# Patient Record
Sex: FEMALE | Race: BLACK OR AFRICAN AMERICAN | NOT HISPANIC OR LATINO | Employment: OTHER | URBAN - NONMETROPOLITAN AREA
[De-identification: names, ages, dates, MRNs, and addresses within clinical notes are randomized per-mention and may not be internally consistent; named-entity substitution may affect disease eponyms.]

---

## 2017-01-03 ENCOUNTER — TELEPHONE (OUTPATIENT)
Dept: FAMILY MEDICINE CLINIC | Facility: CLINIC | Age: 72
End: 2017-01-03

## 2017-01-03 RX ORDER — AMLODIPINE BESYLATE 5 MG/1
5 TABLET ORAL DAILY
Qty: 30 TABLET | Refills: 5 | Status: SHIPPED | OUTPATIENT
Start: 2017-01-03 | End: 2017-11-17 | Stop reason: SDUPTHER

## 2017-02-06 RX ORDER — MECLIZINE HYDROCHLORIDE 25 MG/1
TABLET ORAL
Qty: 90 TABLET | Refills: 0 | Status: CANCELLED | OUTPATIENT
Start: 2017-02-06

## 2017-02-07 ENCOUNTER — TELEPHONE (OUTPATIENT)
Dept: FAMILY MEDICINE CLINIC | Facility: CLINIC | Age: 72
End: 2017-02-07

## 2017-02-07 NOTE — TELEPHONE ENCOUNTER
----- Message from Carey Thurston sent at 2/7/2017  8:51 AM CST -----  Regarding: Med refill  Contact: [phone]  Patient needs hydroxychloroquine 200 mg and meclizine 25 mg refilled at [facility].

## 2017-02-09 RX ORDER — MECLIZINE HYDROCHLORIDE 25 MG/1
25 TABLET ORAL 3 TIMES DAILY PRN
Qty: 30 TABLET | Refills: 2 | Status: SHIPPED | OUTPATIENT
Start: 2017-02-09 | End: 2017-03-30 | Stop reason: SDUPTHER

## 2017-02-09 RX ORDER — HYDROXYCHLOROQUINE SULFATE 200 MG/1
200 TABLET, FILM COATED ORAL DAILY
Qty: 30 TABLET | Refills: 2 | Status: SHIPPED | OUTPATIENT
Start: 2017-02-09 | End: 2021-04-20

## 2017-03-30 ENCOUNTER — OFFICE VISIT (OUTPATIENT)
Dept: FAMILY MEDICINE CLINIC | Facility: CLINIC | Age: 72
End: 2017-03-30

## 2017-03-30 VITALS
BODY MASS INDEX: 18.97 KG/M2 | WEIGHT: 111.1 LBS | SYSTOLIC BLOOD PRESSURE: 120 MMHG | HEART RATE: 75 BPM | DIASTOLIC BLOOD PRESSURE: 80 MMHG | OXYGEN SATURATION: 92 % | HEIGHT: 64 IN

## 2017-03-30 DIAGNOSIS — Z12.31 ENCOUNTER FOR SCREENING MAMMOGRAM FOR BREAST CANCER: ICD-10-CM

## 2017-03-30 DIAGNOSIS — R42 VERTIGO: Primary | ICD-10-CM

## 2017-03-30 DIAGNOSIS — Z23 NEED FOR STREPTOCOCCUS PNEUMONIAE VACCINATION: ICD-10-CM

## 2017-03-30 DIAGNOSIS — J30.9 ALLERGIC RHINITIS, UNSPECIFIED ALLERGIC RHINITIS TRIGGER, UNSPECIFIED RHINITIS SEASONALITY: ICD-10-CM

## 2017-03-30 DIAGNOSIS — G62.9 NEUROPATHY: ICD-10-CM

## 2017-03-30 PROCEDURE — 90670 PCV13 VACCINE IM: CPT | Performed by: FAMILY MEDICINE

## 2017-03-30 PROCEDURE — 99213 OFFICE O/P EST LOW 20 MIN: CPT | Performed by: FAMILY MEDICINE

## 2017-03-30 PROCEDURE — G0009 ADMIN PNEUMOCOCCAL VACCINE: HCPCS | Performed by: FAMILY MEDICINE

## 2017-03-30 RX ORDER — GABAPENTIN 300 MG/1
300 CAPSULE ORAL 3 TIMES DAILY
Qty: 90 CAPSULE | Refills: 3 | Status: SHIPPED | OUTPATIENT
Start: 2017-03-30 | End: 2017-11-17 | Stop reason: SDUPTHER

## 2017-03-30 RX ORDER — MECLIZINE HYDROCHLORIDE 25 MG/1
25 TABLET ORAL 3 TIMES DAILY PRN
Qty: 30 TABLET | Refills: 2 | Status: SHIPPED | OUTPATIENT
Start: 2017-03-30 | End: 2017-11-17 | Stop reason: SDUPTHER

## 2017-03-30 RX ORDER — FLUTICASONE PROPIONATE 50 MCG
2 SPRAY, SUSPENSION (ML) NASAL DAILY
Qty: 1 EACH | Refills: 0 | Status: SHIPPED | OUTPATIENT
Start: 2017-03-30 | End: 2017-04-29

## 2017-04-10 ENCOUNTER — TELEPHONE (OUTPATIENT)
Dept: FAMILY MEDICINE CLINIC | Facility: CLINIC | Age: 72
End: 2017-04-10

## 2017-04-10 NOTE — TELEPHONE ENCOUNTER
----- Message from Carey Thurston sent at 4/10/2017  1:40 PM CDT -----  Regarding: med refill  Contact: [phone]  Patient wants a refill on divalprox 500 mg at [facility]

## 2017-04-11 RX ORDER — DIVALPROEX SODIUM 500 MG/1
500 TABLET, DELAYED RELEASE ORAL DAILY
Qty: 30 TABLET | Refills: 5 | Status: SHIPPED | OUTPATIENT
Start: 2017-04-11 | End: 2017-11-17 | Stop reason: SDUPTHER

## 2017-04-12 ENCOUNTER — OFFICE VISIT (OUTPATIENT)
Dept: OPHTHALMOLOGY | Facility: CLINIC | Age: 72
End: 2017-04-12

## 2017-04-12 ENCOUNTER — TELEPHONE (OUTPATIENT)
Dept: FAMILY MEDICINE CLINIC | Facility: CLINIC | Age: 72
End: 2017-04-12

## 2017-04-12 DIAGNOSIS — IMO0002 NUCLEAR CATARACT, BILATERAL: ICD-10-CM

## 2017-04-12 DIAGNOSIS — Z79.899 LONG-TERM USE OF HYDROXYCHLOROQUINE: Primary | ICD-10-CM

## 2017-04-12 DIAGNOSIS — H40.003 BORDERLINE GLAUCOMA, BILATERAL: ICD-10-CM

## 2017-04-12 PROBLEM — H40.009 BORDERLINE GLAUCOMA: Status: ACTIVE | Noted: 2017-04-12

## 2017-04-12 PROCEDURE — 92004 COMPRE OPH EXAM NEW PT 1/>: CPT | Performed by: OPHTHALMOLOGY

## 2017-04-12 PROCEDURE — 92134 CPTRZ OPH DX IMG PST SGM RTA: CPT | Performed by: OPHTHALMOLOGY

## 2017-04-12 PROCEDURE — 92083 EXTENDED VISUAL FIELD XM: CPT | Performed by: OPHTHALMOLOGY

## 2017-04-12 NOTE — PROGRESS NOTES
Subjective   Mary Anne Wolf is a 71 y.o. female.   Chief Complaint   Patient presents with   • Eye Exam     HPI     Eye Exam   Laterality: both eyes   Quality: blurry vision   Severity: mild           Comments   No vision complaints, on Plaquenil over 5 years       Last edited by Wai Chavarria MD on 4/12/2017  9:44 AM. (History)          Review of Systems    Objective   Base Eye Exam     Visual Acuity (Snellen - Linear)      Right Left   Dist cc 20/30 -1 20/25   Near cc J1 J2       Correction:  Glasses      Tonometry (Applanation, 9:46 AM)      Right Left   Pressure 17 17         Pupils      Pupils   Right PERRL   Left PERRL         Visual Fields      Left Right   Result Full Full         Extraocular Movement      Right Left   Result Full Full         Dilation     Both eyes:  1.0% Mydriacyl, 2.5% Danilo Synephrine @ 10:18 AM            Additional Tests     Color      Right Left   Ishihara 15/15 15/15               Slit Lamp and Fundus Exam     External Exam      Right Left    External Normal Normal      Slit Lamp Exam      Right Left    Lids/Lashes Normal Normal    Conjunctiva/Sclera White and quiet White and quiet    Cornea Clear Clear    Anterior Chamber Deep and quiet Deep and quiet    Iris Round and reactive Round and reactive    Lens 1+ Nuclear sclerosis 1+ Nuclear sclerosis    Vitreous Normal Normal      Fundus Exam      Right Left    Disc Thin rim 0.1 ST, 0.15 IT Thin rim 0.1 ST, 0.15 It    Macula Normal Normal    Vessels Normal Normal    Periphery Normal Normal            Refraction     Wearing Rx      Sphere Axis Add   Right +2.50 180 +2.75   Left +1.75 180 +2.75         Manifest Refraction      Sphere Cylinder Axis Dist   Right +2.00 +0.75 015 20/25+   Left +1.75 Sphere  20/25+         Final Rx      Sphere Cylinder Axis Add   Right +2.00 +0.75 015 +2.50   Left +1.75 Sphere  +2.50               Cintron Visual Field : 10-2  OD- normal  OS- normal     OCT Macula:  OD- normal photoreceptor layer  OS-  normal photoreceptor layer      Assessment/Plan   Diagnoses and all orders for this visit:    Long-term use of hydroxychloroquine  Comments:  no ocular adverse effects    Nuclear cataract, bilateral    Borderline glaucoma, bilateral  Comments:  suspicious disc cupping      Plan:   discussed findings  rec glaucoma eval  Glasses Rx given per refraction  cooyt to Dr Gay    Return in about 2 weeks (around 4/26/2017) for Visual Field 24-2, OCT discs, CCT.

## 2017-04-12 NOTE — TELEPHONE ENCOUNTER
----- Message from Carey Thurston sent at 4/12/2017  3:25 PM CDT -----  Regarding: returning call  Contact: [phone]  Patient was returning your call

## 2017-04-20 PROBLEM — M81.0 OSTEOPOROSIS, UNSPECIFIED: Status: ACTIVE | Noted: 2017-04-20

## 2017-04-21 ENCOUNTER — INFUSION (OUTPATIENT)
Dept: ONCOLOGY | Facility: HOSPITAL | Age: 72
End: 2017-04-21

## 2017-04-21 DIAGNOSIS — M81.0 OSTEOPOROSIS, UNSPECIFIED: Primary | ICD-10-CM

## 2017-04-21 PROCEDURE — 25010000002 DENOSUMAB 60 MG/ML SOLUTION: Performed by: INTERNAL MEDICINE

## 2017-04-21 PROCEDURE — 96372 THER/PROPH/DIAG INJ SC/IM: CPT | Performed by: HOSPITALIST

## 2017-04-21 RX ADMIN — DENOSUMAB 60 MG: 60 INJECTION SUBCUTANEOUS at 08:20

## 2017-04-25 ENCOUNTER — OFFICE VISIT (OUTPATIENT)
Dept: OPHTHALMOLOGY | Facility: CLINIC | Age: 72
End: 2017-04-25

## 2017-04-25 DIAGNOSIS — H40.003 BORDERLINE GLAUCOMA, BILATERAL: Primary | ICD-10-CM

## 2017-04-25 PROCEDURE — 92133 CPTRZD OPH DX IMG PST SGM ON: CPT | Performed by: OPHTHALMOLOGY

## 2017-04-25 PROCEDURE — 99212 OFFICE O/P EST SF 10 MIN: CPT | Performed by: OPHTHALMOLOGY

## 2017-04-25 PROCEDURE — 76514 ECHO EXAM OF EYE THICKNESS: CPT | Performed by: OPHTHALMOLOGY

## 2017-04-25 PROCEDURE — 92082 INTERMEDIATE VISUAL FIELD XM: CPT | Performed by: OPHTHALMOLOGY

## 2017-04-25 NOTE — PROGRESS NOTES
Subjective   Mary Anne Wolf is a 71 y.o. female.   Chief Complaint   Patient presents with   • Cataract   • Glaucoma   • long term use of Plaquenil     HPI     Cataract   Laterality: both eyes           Comments   F/u for CCT, VF, OCT discs       Last edited by Wai Chavarria MD on 4/25/2017 11:23 AM. (History)          Review of Systems    Objective   Base Eye Exam     Visual Acuity (Snellen - Linear)      Right Left   Dist cc 20/40 20/50 +2       Correction:  Glasses      Tonometry (Applanation, 11:23 AM)      Right Left   Pressure 11 11         Pachymetry (4/25/2017)      Right Left   Thickness 464 477               Slit Lamp and Fundus Exam     External Exam      Right Left    External Normal Normal      Slit Lamp Exam      Right Left    Lids/Lashes Normal Normal    Conjunctiva/Sclera White and quiet White and quiet    Cornea Clear Clear    Anterior Chamber Deep and quiet Deep and quiet    Iris Round and reactive Round and reactive    Lens 1+ Nuclear sclerosis 1+ Nuclear sclerosis    Vitreous Normal Normal      Fundus Exam      Right Left    Disc Thin rim 0.1 ST, 0.15 IT Thin rim 0.1 ST, 0.15 It    Macula Normal Normal    Vessels Normal Normal            Refraction     Wearing Rx      Sphere Axis Add   Right +2.50 180 +2.75   Left +1.75 180 +2.75               Cintron Visual Field : 24-2  OD- normal  OS- normal     OCT Disc:   OD- normal  OS- normal      Assessment/Plan   Diagnoses and all orders for this visit:    Borderline glaucoma, bilateral      Plan:   discussed findings    Return in about 6 months (around 10/25/2017).

## 2017-06-29 ENCOUNTER — OFFICE VISIT (OUTPATIENT)
Dept: FAMILY MEDICINE CLINIC | Facility: CLINIC | Age: 72
End: 2017-06-29

## 2017-06-29 VITALS
BODY MASS INDEX: 19.89 KG/M2 | HEIGHT: 62 IN | DIASTOLIC BLOOD PRESSURE: 68 MMHG | HEART RATE: 79 BPM | OXYGEN SATURATION: 93 % | WEIGHT: 108.1 LBS | SYSTOLIC BLOOD PRESSURE: 110 MMHG

## 2017-06-29 DIAGNOSIS — R42 VERTIGO: ICD-10-CM

## 2017-06-29 DIAGNOSIS — Z00.00 MEDICARE ANNUAL WELLNESS VISIT, INITIAL: Primary | ICD-10-CM

## 2017-06-29 PROCEDURE — G0438 PPPS, INITIAL VISIT: HCPCS | Performed by: FAMILY MEDICINE

## 2017-06-29 RX ORDER — FAMOTIDINE 40 MG/1
40 TABLET, FILM COATED ORAL DAILY
Qty: 30 TABLET | Refills: 11 | Status: SHIPPED | OUTPATIENT
Start: 2017-06-29 | End: 2017-11-17

## 2017-06-29 RX ORDER — CETIRIZINE HYDROCHLORIDE 10 MG/1
10 TABLET ORAL DAILY
Qty: 30 TABLET | Refills: 11 | Status: SHIPPED | OUTPATIENT
Start: 2017-06-29 | End: 2017-11-17 | Stop reason: SDUPTHER

## 2017-06-29 NOTE — PROGRESS NOTES
QUICK REFERENCE INFORMATION:  The ABCs of the Annual Wellness Visit    Initial Medicare Wellness Visit    HEALTH RISK ASSESSMENT    1945    Recent Hospitalizations:  No hospitalization(s) within the last year..    Current Medical Providers:  Patient Care Team:  Bess Suárez DO as PCP - General (Family Medicine)      Smoking Status:  History   Smoking Status   • Current Every Day Smoker   Smokeless Tobacco   • Never Used       Alcohol Consumption:  History   Alcohol Use No       Depression Screen:   PHQ-9 Depression Screening 6/29/2017   Little interest or pleasure in doing things 0   Feeling down, depressed, or hopeless 0   PHQ-9 Total Score 0       Health Habits and Functional and Cognitive Screening:  Functional & Cognitive Status 6/29/2017   Do you have difficulty preparing food and eating? No   Do you have difficulty bathing yourself? No   Do you have difficulty getting dressed? No   Do you have difficulty using the toilet? No   Do you have difficulty moving around from place to place? Yes   In the past year have you fallen or experienced a near fall? No   Do you need help using the phone?  No   Are you deaf or do you have serious difficulty hearing?  No   Do you need help with transportation? No   Do you need help shopping? No   Do you need help preparing meals?  No   Do you need help with housework?  No   Do you need help with laundry? No   Do you need help taking your medications? No   Do you need help managing money? No       Health Habits  Current Diet: Frequent Junk Food  Dental Exam: Not up to date  Eye Exam: Up to date (April 2017)  Exercise (times per week): 2 times per week (does some yard work )  Current Exercise Activities Include: Walking (tries to wallk some during the week )      Does the patient have evidence of cognitive impairment? No    Asiprin use counseling: Start ASA 81 mg daily       Recent Lab Results:    Visual Acuity:  No exam data present    Age-appropriate Screening  Schedule:  Refer to the list below for future screening recommendations based on patient's age, sex and/or medical conditions. Orders for these recommended tests are listed in the plan section. The patient has been provided with a written plan.    Health Maintenance   Topic Date Due   • TDAP/TD VACCINES (1 - Tdap) 06/04/1964   • COLONOSCOPY  10/14/2016   • ZOSTER VACCINE  10/14/2016   • INFLUENZA VACCINE  08/01/2017   • DXA SCAN  11/12/2017   • PNEUMOCOCCAL VACCINES (65+ LOW/MEDIUM RISK) (2 of 2 - PPSV23) 03/30/2018   • MAMMOGRAM  04/12/2019        Subjective   History of Present Illness    Mary Anne Wolf is a 72 y.o. female who presents for an Annual Wellness Visit.      The following portions of the patient's history were reviewed and updated as appropriate: allergies, current medications, past family history, past medical history, past social history, past surgical history and problem list.    Outpatient Medications Prior to Visit   Medication Sig Dispense Refill   • amLODIPine (NORVASC) 5 MG tablet Take 1 tablet by mouth Daily. 30 tablet 5   • baclofen (LIORESAL) 10 MG tablet Take 10 mg by mouth 3 (Three) Times a Day.     • Cyanocobalamin (VITAMIN B-12 ER) 1500 MCG tablet controlled-release Take 1 tablet by mouth Daily.     • denosumab (PROLIA) 60 MG/ML solution syringe Inject 60 mg under the skin 1 (One) Time.     • divalproex (DEPAKOTE) 500 MG DR tablet Take 1 tablet by mouth Daily. 30 tablet 5   • gabapentin (NEURONTIN) 300 MG capsule Take 1 capsule by mouth 3 (Three) Times a Day. 90 capsule 3   • hydroxychloroquine (PLAQUENIL) 200 MG tablet Take 1 tablet by mouth Daily. 30 tablet 2   • meclizine (ANTIVERT) 25 MG tablet Take 1 tablet by mouth 3 (Three) Times a Day As Needed for dizziness. 30 tablet 2   • omeprazole (PriLOSEC) 40 MG capsule Take 1 capsule by mouth Daily.       No facility-administered medications prior to visit.        Patient Active Problem List   Diagnosis   • Essential hypertension   •  "Neuropathy   • Seizure disorder   • Vertigo   • Systemic lupus erythematosus   • Borderline glaucoma   • Nuclear cataract   • Long-term use of hydroxychloroquine   • Osteoporosis, unspecified       Advance Care Planning:  has NO advance directive - information provided to the patient today    Identification of Risk Factors:  Risk factors include: weight  and chronic pain.    Review of Systems    Compared to one year ago, the patient feels her physical health is the same.  Compared to one year ago, the patient feels her mental health is the same.    Objective     Physical Exam    Vitals:    06/29/17 0813   BP: 110/68   BP Location: Left arm   Patient Position: Sitting   Cuff Size: Small Adult   Pulse: 79   SpO2: 93%   Weight: 108 lb 1.6 oz (49 kg)   Height: 61.5\" (156.2 cm)   PainSc: 0-No pain       Body mass index is 20.09 kg/(m^2).  Discussed the patient's BMI with her. The BMI is below average; BMI management plan is completed.    Assessment/Plan   Patient Self-Management and Personalized Health Advice  The patient has been provided with information about: tobacco cessation and preventive services including:   · Advance directive, colonoscopy recommended, patient declined.    Visit Diagnoses:    ICD-10-CM ICD-9-CM   1. Medicare annual wellness visit, initial Z00.00 V70.0   2. Vertigo R42 780.4     -Patient declines colonoscopy. Cologuard discussed. Patient declines today.  -she states she has had CT chest and DEXA at [facility] by her rheumatologist this year. She will get records from them and bring them  - counseled on smoking cessation. Not ready to quit.   - meclizine refilled  -discussed depression screening, patient states she is not depressed. Trouble sleeping due to reflux, which she is not taking anything for. Will start pepcid after discuss risks of ppi's    No orders of the defined types were placed in this encounter.      Outpatient Encounter Prescriptions as of 6/29/2017   Medication Sig Dispense " Refill   • amLODIPine (NORVASC) 5 MG tablet Take 1 tablet by mouth Daily. 30 tablet 5   • baclofen (LIORESAL) 10 MG tablet Take 10 mg by mouth 3 (Three) Times a Day.     • Cyanocobalamin (VITAMIN B-12 ER) 1500 MCG tablet controlled-release Take 1 tablet by mouth Daily.     • denosumab (PROLIA) 60 MG/ML solution syringe Inject 60 mg under the skin 1 (One) Time.     • divalproex (DEPAKOTE) 500 MG DR tablet Take 1 tablet by mouth Daily. 30 tablet 5   • gabapentin (NEURONTIN) 300 MG capsule Take 1 capsule by mouth 3 (Three) Times a Day. 90 capsule 3   • hydroxychloroquine (PLAQUENIL) 200 MG tablet Take 1 tablet by mouth Daily. 30 tablet 2   • meclizine (ANTIVERT) 25 MG tablet Take 1 tablet by mouth 3 (Three) Times a Day As Needed for dizziness. 30 tablet 2   • [DISCONTINUED] omeprazole (PriLOSEC) 40 MG capsule Take 1 capsule by mouth Daily.     • cetirizine (zyrTEC) 10 MG tablet Take 1 tablet by mouth Daily. 30 tablet 11   • famotidine (PEPCID) 40 MG tablet Take 1 tablet by mouth Daily. 30 tablet 11     No facility-administered encounter medications on file as of 6/29/2017.        Reviewed use of high risk medication in the elderly: yes  Reviewed for potential of harmful drug interactions in the elderly: yes    Follow Up:  Return in about 6 months (around 12/29/2017) for Recheck.     An After Visit Summary and PPPS with all of these plans were given to the patient.              This document has been electronically signed by Bess Suárez DO on June 29, 2017 1:50 PM

## 2017-06-29 NOTE — PATIENT INSTRUCTIONS
Medicare Wellness  Personal Prevention Plan of Service     Date of Office Visit:  2017  Encounter Provider:  Bess Suárez DO  Place of Service:  [facility]  Patient Name: Mary Anne Wolf  :  1945    As part of the Medicare Wellness portion of your visit today, we are providing you with this personalized preventive plan of services (PPPS). This plan is based upon recommendations of the [place] Preventive Services Task Force (USPSTF) and the Advisory Committee on Immunization Practices (ACIP).    This lists the preventive care services that should be considered, and provides dates of when you are due. Items listed as completed are up-to-date and do not require any further intervention.    Health Maintenance   Topic Date Due   • TDAP/TD VACCINES (1 - Tdap) 1963   • MEDICARE ANNUAL WELLNESS  10/14/2016   • COLONOSCOPY  10/14/2016   • ZOSTER VACCINE  10/14/2016   • INFLUENZA VACCINE  2017   • DXA SCAN  2017   • PNEUMOCOCCAL VACCINES (65+ LOW/MEDIUM RISK) (2 of 2 - PPSV23) 2018   • MAMMOGRAM  2019       No orders of the defined types were placed in this encounter.      Return in about 6 months (around 2017) for Recheck.

## 2017-07-06 DIAGNOSIS — M79.672 PAIN IN BOTH FEET: Primary | ICD-10-CM

## 2017-07-06 DIAGNOSIS — M79.671 PAIN IN BOTH FEET: Primary | ICD-10-CM

## 2017-07-07 ENCOUNTER — HOSPITAL ENCOUNTER (EMERGENCY)
Facility: HOSPITAL | Age: 72
Discharge: HOME | End: 2017-07-07
Attending: EMERGENCY MEDICINE | Admitting: EMERGENCY MEDICINE

## 2017-07-07 ENCOUNTER — APPOINTMENT (OUTPATIENT)
Dept: CT IMAGING | Facility: HOSPITAL | Age: 72
End: 2017-07-07

## 2017-07-07 VITALS
RESPIRATION RATE: 18 BRPM | OXYGEN SATURATION: 97 % | TEMPERATURE: 98.4 F | SYSTOLIC BLOOD PRESSURE: 154 MMHG | BODY MASS INDEX: 16.95 KG/M2 | DIASTOLIC BLOOD PRESSURE: 79 MMHG | HEIGHT: 67 IN | WEIGHT: 108 LBS | HEART RATE: 78 BPM
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
## 2017-07-07 DIAGNOSIS — M54.50 ACUTE MIDLINE LOW BACK PAIN WITHOUT SCIATICA: ICD-10-CM

## 2017-07-07 DIAGNOSIS — S32.010A COMPRESSION FRACTURE OF L1 LUMBAR VERTEBRA, CLOSED, INITIAL ENCOUNTER (HCC): Primary | ICD-10-CM

## 2017-07-07 PROCEDURE — 72131 CT LUMBAR SPINE W/O DYE: CPT

## 2017-07-07 PROCEDURE — 99283 EMERGENCY DEPT VISIT LOW MDM: CPT

## 2017-07-07 PROCEDURE — 72128 CT CHEST SPINE W/O DYE: CPT

## 2017-07-07 RX ORDER — HYDROCODONE BITARTRATE AND ACETAMINOPHEN 5; 325 MG/1; MG/1
1 TABLET ORAL EVERY 6 HOURS PRN
Qty: 20 TABLET | Refills: 0 | Status: SHIPPED | OUTPATIENT
Start: 2017-07-07 | End: 2017-11-17

## 2017-07-07 RX ORDER — HYDROCODONE BITARTRATE AND ACETAMINOPHEN 5; 325 MG/1; MG/1
1 TABLET ORAL ONCE
Status: COMPLETED | OUTPATIENT
Start: 2017-07-07 | End: 2017-07-07

## 2017-07-07 RX ORDER — CYCLOBENZAPRINE HCL 10 MG
10 TABLET ORAL ONCE
Status: COMPLETED | OUTPATIENT
Start: 2017-07-07 | End: 2017-07-07

## 2017-07-07 RX ADMIN — CYCLOBENZAPRINE HYDROCHLORIDE 10 MG: 10 TABLET, FILM COATED ORAL at 05:40

## 2017-07-07 RX ADMIN — HYDROCODONE BITARTRATE AND ACETAMINOPHEN 1 TABLET: 5; 325 TABLET ORAL at 06:26

## 2017-07-07 NOTE — ED TRIAGE NOTES
Pt states she was cleaning yesterday approx noon and got twisted in rug and fell back onto back on a carpeted floor, c/o back pain all over back 10/10

## 2017-07-07 NOTE — ED PROVIDER NOTES
Subjective   HPI Comments: Back pain    Patient fell about noon yesterday at home when she got tangled with a robe and fell backwards.  She got up herself and sat down onto the  came back.  She fell on a rug floor   having back pain    Only surgery was on her right knee many years ago EGD colonoscopy    Medical problem osteoporosis lupus high blood pressure GERD seizures    The smoke less than a pack a day            Patient is a 72 y.o. female presenting with fall.   History provided by:  Patient and spouse  Fall   Mechanism of injury: fall    Injury location:  Torso  Torso injury location:  Back  Incident location:  Home  Time since incident:  18 hours  Arrived directly from scene: no    Fall:     Fall occurred:  Walking    Height of fall:  3 feet    Impact surface:  Carpet and hard floor    Point of impact:  Back    Entrapped after fall: no    Protective equipment: none    Suspicion of alcohol use: no    Suspicion of drug use: no    Prior to arrival data:     Bystander interventions:  None    Patient ambulatory at scene: yes      Blood loss:  None    Loss of consciousness: no      Amnesic to event: no      Airway condition since incident:  Stable    Breathing condition since incident:  Stable  Associated symptoms: back pain    Associated symptoms: no abdominal pain, no chest pain, no headaches, no nausea, no neck pain and no seizures    Risk factors: no asthma, no COPD and no steroid use        Review of Systems   Constitutional: Negative for activity change, appetite change, fatigue and fever.   HENT: Negative for congestion, facial swelling, mouth sores, nosebleeds, sore throat and trouble swallowing.    Eyes: Negative for discharge, redness and itching.   Respiratory: Negative for apnea, cough and wheezing.    Cardiovascular: Negative for chest pain and palpitations.   Gastrointestinal: Negative for abdominal pain, blood in stool and nausea.   Endocrine: Negative for cold intolerance, heat intolerance,  polydipsia, polyphagia and polyuria.   Genitourinary: Negative for difficulty urinating, dysuria, flank pain, frequency and hematuria.   Musculoskeletal: Positive for back pain. Negative for gait problem, joint swelling and neck pain.   Skin: Negative.  Negative for color change, pallor and rash.   Allergic/Immunologic: Negative for environmental allergies.   Neurological: Negative for dizziness, seizures, syncope, speech difficulty, light-headedness, numbness and headaches.   Hematological: Negative for adenopathy.   Psychiatric/Behavioral: Negative for agitation, behavioral problems, confusion and sleep disturbance. The patient is not nervous/anxious.        Past Medical History:   Diagnosis Date   • Acute posthemorrhagic anemia    • Backache     LLE radiculopathy   • Cigarette smoker    • Discoid lupus erythematosus    • Diverticular disease of colon     incomplete colonoscopy to Hepatic flexure.  Rec ACBE, pt. considering   • Dizziness and giddiness    • Dysphagia    • Esophagitis     grade III   • Essential (primary) hypertension    • Essential hypertension    • Essential hypertension    • Gastroesophageal reflux disease     rec pt try RX prilosec   • H/O screening mammography    • Localization-related (focal) (partial) symptomatic epilepsy and epileptic syndromes with complex partial seizures, intractable, without status epilepticus    • Neuropathy    • Neuropathy    • Nicotine dependence    • Osteopenia    • Periumbilical pain    • Seizure disorder    • Seizure disorder     history of complex partial seizure   • Stricture of esophagus    • Systemic lupus erythematosus    • Systemic lupus erythematosus    • Vertigo    • Vertigo        Allergies   Allergen Reactions   • Milk-Related Compounds        Past Surgical History:   Procedure Laterality Date   • COLONOSCOPY  08/05/2015   • COLONOSCOPY  07/08/2014    REFUSED BY PATIENT   • COLONOSCOPY  08/05/2015    a diverticulum was found in the sigmoid colon   •  ENDOSCOPY  08/05/2015    Esophageal stricture was present. Dilatatin was performed. Esophagitis . Biopsy taken. Normal stomach. Normal duodenum   • INJECTION OF MEDICATION      Rocehin (2)       Family History   Problem Relation Age of Onset   • Lupus Mother    • Rheum arthritis Mother    • Lupus Sister        Social History     Social History   • Marital status:      Spouse name: N/A   • Number of children: N/A   • Years of education: N/A     Social History Main Topics   • Smoking status: Current Every Day Smoker   • Smokeless tobacco: Never Used   • Alcohol use No   • Drug use: No   • Sexual activity: Defer     Other Topics Concern   • None     Social History Narrative   • None           Objective   Physical Exam   Constitutional: She is oriented to person, place, and time. She appears well-developed and well-nourished.   HENT:   Head: Normocephalic and atraumatic.   Nose: Nose normal.   Mouth/Throat: Oropharynx is clear and moist.   Eyes: Conjunctivae and EOM are normal. Pupils are equal, round, and reactive to light.   Neck: Normal range of motion. Neck supple.   Cardiovascular: Normal rate, regular rhythm, normal heart sounds and intact distal pulses.    Pulmonary/Chest: Effort normal and breath sounds normal.   Abdominal: Soft. Bowel sounds are normal.   Musculoskeletal: Normal range of motion.   Neurological: She is alert and oriented to person, place, and time.   Skin: Skin is warm and dry.   Psychiatric: She has a normal mood and affect. Her behavior is normal. Judgment and thought content normal.   Nursing note and vitals reviewed.      Procedures         ED Course  ED Course        Labs Reviewed - No data to display     CT Thoracic Spine Without Contrast   Final Result   1. Degenerative changes with no acute abnormality of the thoracic   spine.   2. Emphysema.   3. Indeterminate right hepatic dome lesion, when feasible   recommend follow-up liver protocol MRI with contrast.      Electronically  signed by:  Tera Boyce  7/7/2017 6:18 AM CDT   Workstation: [id number]      CT Lumbar Spine Without Contrast   Final Result   1. Acute compression fracture at L1 as above.   2. Levoscoliosis with multilevel degenerative disc disease and   facet arthropathy producing levels of canal stenosis and   foraminal narrowing as above.      Electronically signed by:  Tera Boyce  7/7/2017 6:12 AM CDT   Workstation: [id number]                  [facility]    Final diagnoses:   Compression fracture of L1 lumbar vertebra, closed, initial encounter   Fall, initial encounter   Acute midline low back pain without sciatica            Ehsan Joiner MD  07/07/17 0653       Ehsan Joienr MD  08/20/17 2043       Ehsan Joiner MD  08/20/17 2046

## 2017-08-17 ENCOUNTER — OFFICE VISIT (OUTPATIENT)
Dept: PODIATRY | Facility: CLINIC | Age: 72
End: 2017-08-17

## 2017-08-17 VITALS
HEART RATE: 99 BPM | WEIGHT: 108 LBS | DIASTOLIC BLOOD PRESSURE: 71 MMHG | OXYGEN SATURATION: 96 % | HEIGHT: 67 IN | SYSTOLIC BLOOD PRESSURE: 125 MMHG | BODY MASS INDEX: 16.95 KG/M2

## 2017-08-17 DIAGNOSIS — M79.2 NEUROPATHIC PAIN: Primary | ICD-10-CM

## 2017-08-17 DIAGNOSIS — M79.672 BILATERAL FOOT PAIN: ICD-10-CM

## 2017-08-17 DIAGNOSIS — M20.11 HALLUX VALGUS, RIGHT: ICD-10-CM

## 2017-08-17 DIAGNOSIS — M79.671 BILATERAL FOOT PAIN: ICD-10-CM

## 2017-08-17 PROCEDURE — 99203 OFFICE O/P NEW LOW 30 MIN: CPT | Performed by: PODIATRIST

## 2017-08-17 RX ORDER — PREGABALIN 75 MG/1
75 CAPSULE ORAL 2 TIMES DAILY
Qty: 60 CAPSULE | Refills: 2 | Status: SHIPPED | OUTPATIENT
Start: 2017-08-17 | End: 2017-11-17

## 2017-08-17 NOTE — PROGRESS NOTES
Mary Anne Wolf  1945  72 y.o. female   PCP: Dr. Suárez last seen 6/29/2017.  Patient presents today for bilateral foot pain.     08/17/2017    Chief Complaint   Patient presents with   • Left Foot - Pain   • Right Foot - Pain           History of Present Illness    Mary Anne Wolf is a 72 y.o. female who presents for evaluation of bilateral foot pain.  She describes the pain as burning, shooting and throbbing.  States the pain keeps her up at night.  The pain typically is located on the bottoms of her feet and toes.  The pain occurs independent of weightbearing.  She has no history of trauma or injuries.  She states she was previously diagnosed with neuropathy and has been on gabapentin 300 mg 3 times daily for several years.  She states she received minimal relief from this.      Past Medical History:   Diagnosis Date   • Acute posthemorrhagic anemia    • Backache     LLE radiculopathy   • Cigarette smoker    • Discoid lupus erythematosus    • Diverticular disease of colon     incomplete colonoscopy to Hepatic flexure.  Rec ACBE, pt. considering   • Dizziness and giddiness    • Dysphagia    • Esophagitis     grade III   • Essential (primary) hypertension    • Essential hypertension    • Essential hypertension    • Gastroesophageal reflux disease     rec pt try RX prilosec   • H/O screening mammography    • Localization-related (focal) (partial) symptomatic epilepsy and epileptic syndromes with complex partial seizures, intractable, without status epilepticus    • Neuropathy    • Neuropathy    • Nicotine dependence    • Osteopenia    • Periumbilical pain    • Seizure disorder    • Seizure disorder     history of complex partial seizure   • Stricture of esophagus    • Systemic lupus erythematosus    • Systemic lupus erythematosus    • Vertigo    • Vertigo          Past Surgical History:   Procedure Laterality Date   • COLONOSCOPY  08/05/2015   • COLONOSCOPY  07/08/2014    REFUSED BY PATIENT   • COLONOSCOPY   08/05/2015    a diverticulum was found in the sigmoid colon   • ENDOSCOPY  08/05/2015    Esophageal stricture was present. Dilatatin was performed. Esophagitis . Biopsy taken. Normal stomach. Normal duodenum   • INJECTION OF MEDICATION      Rocehin (2)         Family History   Problem Relation Age of Onset   • Lupus Mother    • Rheum arthritis Mother    • Lupus Sister          Social History     Social History   • Marital status:      Spouse name: N/A   • Number of children: N/A   • Years of education: N/A     Occupational History   • Not on file.     Social History Main Topics   • Smoking status: Current Every Day Smoker   • Smokeless tobacco: Never Used   • Alcohol use No   • Drug use: No   • Sexual activity: Defer     Other Topics Concern   • Not on file     Social History Narrative         Current Outpatient Prescriptions   Medication Sig Dispense Refill   • amLODIPine (NORVASC) 5 MG tablet Take 1 tablet by mouth Daily. 30 tablet 5   • baclofen (LIORESAL) 10 MG tablet Take 10 mg by mouth 3 (Three) Times a Day.     • cetirizine (zyrTEC) 10 MG tablet Take 1 tablet by mouth Daily. 30 tablet 11   • Cyanocobalamin (VITAMIN B-12 ER) 1500 MCG tablet controlled-release Take 1 tablet by mouth Daily.     • denosumab (PROLIA) 60 MG/ML solution syringe Inject 60 mg under the skin 1 (One) Time.     • divalproex (DEPAKOTE) 500 MG DR tablet Take 1 tablet by mouth Daily. 30 tablet 5   • famotidine (PEPCID) 40 MG tablet Take 1 tablet by mouth Daily. 30 tablet 11   • gabapentin (NEURONTIN) 300 MG capsule Take 1 capsule by mouth 3 (Three) Times a Day. 90 capsule 3   • HYDROcodone-acetaminophen (NORCO) 5-325 MG per tablet Take 1 tablet by mouth Every 6 (Six) Hours As Needed for Moderate Pain (4-6). 20 tablet 0   • hydroxychloroquine (PLAQUENIL) 200 MG tablet Take 1 tablet by mouth Daily. 30 tablet 2   • meclizine (ANTIVERT) 25 MG tablet Take 1 tablet by mouth 3 (Three) Times a Day As Needed for dizziness. 30 tablet 2   •  "pregabalin (LYRICA) 75 MG capsule Take 1 capsule by mouth 2 (Two) Times a Day. 60 capsule 2     No current facility-administered medications for this visit.          OBJECTIVE    /71  Pulse 99  Ht 67\" (170.2 cm)  Wt 108 lb (49 kg)  LMP  (LMP Unknown)  SpO2 96%  BMI 16.92 kg/m2      Review of Systems   Constitutional:  Denies recent weight loss, weight gain, fever or chills, no change in exercise tolerance  Musculoskeletal: Back, leg pain.   Skin:  Thickened nails.   Neurological:  Burning sensations to feet b/l.  Psychiatric/Behavioral: Denies depression    Physical Exam   Constitutional: she appears well-developed and well-nourished.   HEENT: Normocephalic. Atraumatic  CV: No tenderness. RRR  Resp: Non-labored respiration. No wheezes.   Psychiatric: she has a normal mood and affect. her   behavior is normal.      Lower Extremity Exam:  Vascular: DP/PT pulses palpable 1+.   Negative hair growth.   Minimal perimalleolar edema  Neuro: Protective sensation diminished to lesser toes, b/l.  DTRs intact  Negative Tinel sign over tarsal tunnel  Integument: No open wounds or lesions.  Skin quality normal  No masses  Web spaces clean dry and intact  Musculoskeletal: LE muscle strength 4/5.   Gait assisted with cane  Mild hammertoe deformity toes 2-5 b/l.  Moderate to severe hallux abductovalgus on the right  No reproducible musculoskeletal pain        ASSESSMENT AND PLAN    Mary Anne was seen today for pain and pain.    Diagnoses and all orders for this visit:    Neuropathic pain    Bilateral foot pain  -     XR foot 3+ vw bilateral    Hallux valgus, right    Other orders  -     pregabalin (LYRICA) 75 MG capsule; Take 1 capsule by mouth 2 (Two) Times a Day.      -Comprehensive foot and ankle exam performed  -Educated pt on diagnosis, etiology and treatment of neuropathic pain  -Patient has been taking gabapentin with minimal relief for a long time.  Will trial Lyrica.  We'll start her on a 75 mg twice a " day.  -Addition we will obtain [facility] compounded pain cream  -Recommended wide toe box soft topped shoes for accommodation of bunion deformity  -Recheck 4 weeks          This document has been electronically signed by Cory Wallace DPM on August 25, 2017 5:42 AM     EMR Dragon/Transcription disclaimer:   Much of this encounter note is an electronic transcription/translation of spoken language to printed text. The electronic translation of spoken language may permit erroneous, or at times, nonsensical words or phrases to be inadvertently transcribed; Although I have reviewed the note for such errors, some may still exist.    Cory Wallace DPM  8/25/2017  5:42 AM

## 2017-10-24 ENCOUNTER — INFUSION (OUTPATIENT)
Dept: ONCOLOGY | Facility: HOSPITAL | Age: 72
End: 2017-10-24

## 2017-10-24 DIAGNOSIS — M81.0 OSTEOPOROSIS, UNSPECIFIED OSTEOPOROSIS TYPE, UNSPECIFIED PATHOLOGICAL FRACTURE PRESENCE: Primary | ICD-10-CM

## 2017-10-24 LAB
ALBUMIN SERPL-MCNC: 3.7 G/DL (ref 3.4–4.8)
ALBUMIN/GLOB SERPL: 1 G/DL (ref 1.1–1.8)
ALP SERPL-CCNC: 51 U/L (ref 38–126)
ALT SERPL W P-5'-P-CCNC: 18 U/L (ref 9–52)
ANION GAP SERPL CALCULATED.3IONS-SCNC: 11 MMOL/L (ref 5–15)
AST SERPL-CCNC: 18 U/L (ref 14–36)
BILIRUB SERPL-MCNC: 0.3 MG/DL (ref 0.2–1.3)
BUN BLD-MCNC: 12 MG/DL (ref 7–21)
BUN/CREAT SERPL: 11.8 (ref 7–25)
CALCIUM SPEC-SCNC: 9.6 MG/DL (ref 8.4–10.2)
CHLORIDE SERPL-SCNC: 102 MMOL/L (ref 95–110)
CO2 SERPL-SCNC: 29 MMOL/L (ref 22–31)
CREAT BLD-MCNC: 1.02 MG/DL (ref 0.5–1)
GFR SERPL CREATININE-BSD FRML MDRD: 64 ML/MIN/1.73 (ref 39–90)
GLOBULIN UR ELPH-MCNC: 3.6 GM/DL (ref 2.3–3.5)
GLUCOSE BLD-MCNC: 93 MG/DL (ref 60–100)
MAGNESIUM SERPL-MCNC: 2 MG/DL (ref 1.6–2.3)
PHOSPHATE SERPL-MCNC: 4.1 MG/DL (ref 2.4–4.4)
POTASSIUM BLD-SCNC: 4.2 MMOL/L (ref 3.5–5.1)
PROT SERPL-MCNC: 7.3 G/DL (ref 6.3–8.6)
SODIUM BLD-SCNC: 142 MMOL/L (ref 137–145)

## 2017-10-24 PROCEDURE — 25010000002 DENOSUMAB 60 MG/ML SOLUTION: Performed by: FAMILY MEDICINE

## 2017-10-24 PROCEDURE — 36415 COLL VENOUS BLD VENIPUNCTURE: CPT | Performed by: HOSPITALIST

## 2017-10-24 PROCEDURE — 96372 THER/PROPH/DIAG INJ SC/IM: CPT | Performed by: HOSPITALIST

## 2017-10-24 PROCEDURE — 83735 ASSAY OF MAGNESIUM: CPT

## 2017-10-24 PROCEDURE — 80053 COMPREHEN METABOLIC PANEL: CPT

## 2017-10-24 PROCEDURE — 84100 ASSAY OF PHOSPHORUS: CPT

## 2017-10-24 RX ADMIN — DENOSUMAB 60 MG: 60 INJECTION SUBCUTANEOUS at 15:09

## 2017-10-25 ENCOUNTER — TRANSCRIBE ORDERS (OUTPATIENT)
Dept: PHYSICAL THERAPY | Facility: HOSPITAL | Age: 72
End: 2017-10-25

## 2017-10-25 DIAGNOSIS — M54.42 ACUTE BACK PAIN WITH SCIATICA, LEFT: Primary | ICD-10-CM

## 2017-10-25 DIAGNOSIS — G89.29 CHRONIC INTRACTABLE PAIN: ICD-10-CM

## 2017-10-31 DIAGNOSIS — G62.9 NEUROPATHY: ICD-10-CM

## 2017-10-31 RX ORDER — GABAPENTIN 300 MG/1
CAPSULE ORAL
Qty: 90 CAPSULE | Refills: 1 | OUTPATIENT
Start: 2017-10-31

## 2017-11-01 ENCOUNTER — HOSPITAL ENCOUNTER (OUTPATIENT)
Dept: PHYSICAL THERAPY | Facility: HOSPITAL | Age: 72
Setting detail: THERAPIES SERIES
Discharge: HOME | End: 2017-11-01

## 2017-11-01 DIAGNOSIS — M54.42 ACUTE BACK PAIN WITH SCIATICA, LEFT: Primary | ICD-10-CM

## 2017-11-01 DIAGNOSIS — R26.89 BALANCE PROBLEM: ICD-10-CM

## 2017-11-01 PROCEDURE — G8979 MOBILITY GOAL STATUS: HCPCS

## 2017-11-01 PROCEDURE — G8978 MOBILITY CURRENT STATUS: HCPCS

## 2017-11-01 PROCEDURE — 97161 PT EVAL LOW COMPLEX 20 MIN: CPT

## 2017-11-01 NOTE — THERAPY EVALUATION
Outpatient Physical Therapy Ortho Initial Evaluation  [facility]     Patient Name: Mary Anne Wolf  : 1945  MRN: [id number]  Today's Date: 2017      Visit Date: 2017  Subjective Improvement: 0%  Visit Number: 1  Insurance approval: Pending  MD Visit: PRN  Recert Date: 2017      Therapy Diagnosis: Chronic LBP and Balance Disorder secondary to Core and LE Weakness.  Patient Active Problem List   Diagnosis   • Essential hypertension   • Neuropathy   • Seizure disorder   • Vertigo   • Systemic lupus erythematosus   • Borderline glaucoma   • Nuclear cataract   • Long-term use of hydroxychloroquine   • Osteoporosis, unspecified        Past Medical History:   Diagnosis Date   • Acute posthemorrhagic anemia    • Backache     LLE radiculopathy   • Cigarette smoker    • Discoid lupus erythematosus    • Diverticular disease of colon     incomplete colonoscopy to Hepatic flexure.  Rec ACBE, pt. considering   • Dizziness and giddiness    • Dysphagia    • Esophagitis     grade III   • Essential (primary) hypertension    • Essential hypertension    • Essential hypertension    • Gastroesophageal reflux disease     rec pt try RX prilosec   • H/O screening mammography    • Localization-related (focal) (partial) symptomatic epilepsy and epileptic syndromes with complex partial seizures, intractable, without status epilepticus    • Neuropathy    • Neuropathy    • Nicotine dependence    • Osteopenia    • Periumbilical pain    • Seizure disorder    • Seizure disorder     history of complex partial seizure   • Stricture of esophagus    • Systemic lupus erythematosus    • Systemic lupus erythematosus    • Vertigo    • Vertigo         Past Surgical History:   Procedure Laterality Date   • COLONOSCOPY  2015   • COLONOSCOPY  2014    REFUSED BY PATIENT   • COLONOSCOPY  2015    a diverticulum was found in the sigmoid colon   • ENDOSCOPY  2015    Esophageal stricture was present.  Dilatatin was performed. Esophagitis . Biopsy taken. Normal stomach. Normal duodenum   • INJECTION OF MEDICATION      Rocehin (2)       Visit Dx:     ICD-10-CM ICD-9-CM   1. Acute back pain with sciatica, left M54.42 724.3   2. Balance problem R26.89 781.99             Patient History       11/01/17 1015          History    Chief Complaint Balance Problems;Pain  -AK      Type of Pain Back pain  -AK      Date Current Problem(s) Began --   6 months ago  -AK      Brief Description of Current Complaint Pt c/o of LBP with B LE radiculopathy which began around 6 months ago after she fell as a result of her chronic standing dynamic balance issues. Pt describes her pain as intermittent, located across entirety of lower back and radiating into B LEs.Pt's pain and balance issues make any prolonged positioning, ambulation and standing activities difficult.  -AK      Patient's Rating of General Health Fair  -AK      Hand Dominance right-handed  -AK      Occupation/sports/leisure activities Retired  -AK        User Key  (r) = Recorded By, (t) = Taken By, (c) = Cosigned By    Initials Name Provider Type    HECTOR Duran, PT Physical Therapist                PT Ortho       11/01/17 1015    Precautions and Contraindications    Precautions/Limitations fall precautions  -AK    Precautions Pt has lupus and osteoporosis.  -AK    Subjective Pain    Able to rate subjective pain? yes  -AK    Pre-Treatment Pain Level 6  -AK    Post-Treatment Pain Level 6  -AK    Subjective Pain Comment  lower back.  -AK    Posture/Observations    Posture/Observations Comments Pt ambulatea 100' intervals with st cane Supervision with a non-antalgic gait pattern and very little trunk rotation. L lumbar Paraspinals are hypertonic. Iliopsoas, Hamstrings and Piriformis testing are all negative; Ely's Test: L=45 degrees, R=39 degrees.  -AK    ROM (Range of Motion)    General ROM Detail Lumbar: Flexion=95 degrees, Extension=9 degrees, L Sidebending=10  degrees, R Sidebending=9 degrees, L Rotation=44 degrees, R Rotation=33 degrees.  -AK    MMT (Manual Muscle Testing)    General MMT Assessment Detail LE strength is symmetrical bilaetrally: Hip Flexion+Extension=3+/5, Hip Abduction=4-/5, Knee Flexion and Extension=4-/5, Ankle DF+PF=4+/5.  -AK      User Key  (r) = Recorded By, (t) = Taken By, (c) = Cosigned By    Initials Name Provider Type    HECTOR Duran, PT Physical Therapist                                PT OP Goals       11/01/17 1015       PT Short Term Goals    STG Date to Achieve 11/22/17  -AK     STG 1 Pt will have no greater than 4/10 pain in lower back at all times.  -AK     STG 2 Pt will have 4-/5 or greater strength in B LEs in all regards  -AK     STG 3 Pt will have 5 degree or greater decrease in lumbar ROM deficts  -AK     STG 4 Pt will be able to ambulate 220' or greater intervals with least restrictive AD Modified Independent  -AK     Long Term Goals    LTG Date to Achieve 12/13/17  -AK     LTG 1 Pt will have no greater than 1/10 pain in lower back at all times.  -AK     LTG 2 Pt will have 4+/5 or greater strnegth in B LEs in all regards  -AK     LTG 3 Pt will have lumbar ROM WFLs  -AK     LTG 4 Pt will be able to ambulate 500' intervals or longer with least restrictive AD Modified Independent.  -AK     LTG 5 Pt will be D/C'd to Fitness Program  -AK     LTG 6 Pt will have 12 point or greater improvement in Modified Oswestry score.  -AK     Time Calculation    PT Goal Re-Cert Due Date 11/22/17  -AK       User Key  (r) = Recorded By, (t) = Taken By, (c) = Cosigned By    Initials Name Provider Type    HECTOR Duran PT Physical Therapist                PT Assessment/Plan       11/01/17 1047       PT Assessment    Functional Limitations Decreased safety during functional activities;Impaired gait;Performance in self-care ADL;Limitation in home management;Limitations in community activities  -AK     Impairments Balance;Coordination;Range of  motion;Muscle strength;Pain;Gait;Posture;Poor body mechanics  -AK     Assessment Comments Pt will benefit from skilled PT intervention addressing deficts in strength, ROM, standing dynamic balance, ambulation endurance and attenuation of pain symptoms in order to decrease risk of falls and prevent reinjury.  -AK     Rehab Potential Good  -AK     Patient/caregiver participated in establishment of treatment plan and goals Yes  -AK     Patient would benefit from skilled therapy intervention Yes  -AK     PT Plan    PT Frequency 1x/week  -AK     Predicted Duration of Therapy Intervention (days/wks) 6 weeks  -AK     Planned CPT's? PT EVAL LOW COMPLEXITY: 17922;PT THER ACT EA 15 MIN: 73961;PT THER SUPP EA 15 MIN;PT MANUAL THERAPY EA 15 MIN: 50299;PT SELF CARE/HOME MGMT/TRAIN EA 15: 81478;PT NEUROMUSC RE-EDUCATION EA 15 MIN: 87589;PT RE-EVAL: 87807;PT GAIT TRAINING EA 15 MIN: 55087;PT THER PROC EA 15 MIN: 18844  -AK     Physical Therapy Interventions (Optional Details) balance training;gait training;gross motor skills;home exercise program;postural re-education;patient/family education;neuromuscular re-education;motor coordination training;modalities;manual therapy techniques;lumbar stabilization;ROM (Range of Motion);strengthening;stair training;stretching;transfer training  -AK       User Key  (r) = Recorded By, (t) = Taken By, (c) = Cosigned By    Initials Name Provider Type    HECTOR Duran, JUD Physical Therapist                  Exercises       11/01/17 1015          Subjective Pain    Able to rate subjective pain? yes  -AK      Pre-Treatment Pain Level 6  -AK      Post-Treatment Pain Level 6  -AK      Subjective Pain Comment  lower back.  -AK        User Key  (r) = Recorded By, (t) = Taken By, (c) = Cosigned By    Initials Name Provider Type    HECTOR Duran, JUD Physical Therapist                              Outcome Measures       11/01/17 1015          Modified Oswestry    Modified Oswestry Score/Comments  50%  -AK      Functional Assessment    Outcome Measure Options Modclaudiad Ulyssesestry  -AK        User Key  (r) = Recorded By, (t) = Taken By, (c) = Cosigned By    Initials Name Provider Type    HECTOR Duran PT Physical Therapist            Time Calculation:   Start Time: 1015  Stop Time: 1045  Time Calculation (min): 30 min  Total Timed Code Minutes- PT: 0 minute(s)     Therapy Charges for Today     Code Description Service Date Service Provider Modifiers Qty    68103325476 HC PT MOBILITY CURRENT 11/1/2017 Matt Duran, PT GP, CK 1    04282886444 HC PT MOBILITY PROJECTED 11/1/2017 Matt Duran, PT GP, CI 1    57373946941 HC PT EVAL LOW COMPLEXITY 2 11/1/2017 Matt Duran, PT GP 1          PT G-Codes  PT Professional Judgement Used?: Yes  Outcome Measure Options: Richied Ulyssesestry  Score: 50%  Functional Limitation: Mobility: Walking and moving around  Mobility: Walking and Moving Around Current Status (): At least 40 percent but less than 60 percent impaired, limited or restricted  Mobility: Walking and Moving Around Goal Status (): At least 1 percent but less than 20 percent impaired, limited or restricted         Matt Duran, PT  11/1/2017

## 2017-11-08 ENCOUNTER — HOSPITAL ENCOUNTER (OUTPATIENT)
Dept: PHYSICAL THERAPY | Facility: HOSPITAL | Age: 72
Setting detail: THERAPIES SERIES
Discharge: HOME | End: 2017-11-08

## 2017-11-08 DIAGNOSIS — M54.42 ACUTE BACK PAIN WITH SCIATICA, LEFT: Primary | ICD-10-CM

## 2017-11-08 PROCEDURE — 97110 THERAPEUTIC EXERCISES: CPT

## 2017-11-08 NOTE — THERAPY TREATMENT NOTE
Outpatient Physical Therapy Ortho Treatment Note  [facility]     Patient Name: Mary Anne Wolf  : 1945  MRN: [id number]  Today's Date: 2017      Visit Date: 2017  Pt has attended 2/2 visits  MD PRN  Recert -17  6 visits approved through   Visit Dx:    ICD-10-CM ICD-9-CM   1. Acute back pain with sciatica, left M54.42 724.3       Patient Active Problem List   Diagnosis   • Essential hypertension   • Neuropathy   • Seizure disorder   • Vertigo   • Systemic lupus erythematosus   • Borderline glaucoma   • Nuclear cataract   • Long-term use of hydroxychloroquine   • Osteoporosis, unspecified        Past Medical History:   Diagnosis Date   • Acute posthemorrhagic anemia    • Backache     LLE radiculopathy   • Cigarette smoker    • Discoid lupus erythematosus    • Diverticular disease of colon     incomplete colonoscopy to Hepatic flexure.  Rec ACBE, pt. considering   • Dizziness and giddiness    • Dysphagia    • Esophagitis     grade III   • Essential (primary) hypertension    • Essential hypertension    • Essential hypertension    • Gastroesophageal reflux disease     rec pt try RX prilosec   • H/O screening mammography    • Localization-related (focal) (partial) symptomatic epilepsy and epileptic syndromes with complex partial seizures, intractable, without status epilepticus    • Neuropathy    • Neuropathy    • Nicotine dependence    • Osteopenia    • Periumbilical pain    • Seizure disorder    • Seizure disorder     history of complex partial seizure   • Stricture of esophagus    • Systemic lupus erythematosus    • Systemic lupus erythematosus    • Vertigo    • Vertigo         Past Surgical History:   Procedure Laterality Date   • COLONOSCOPY  2015   • COLONOSCOPY  2014    REFUSED BY PATIENT   • COLONOSCOPY  2015    a diverticulum was found in the sigmoid colon   • ENDOSCOPY  2015    Esophageal stricture was present. Dilatatin was performed.  Esophagitis . Biopsy taken. Normal stomach. Normal duodenum   • INJECTION OF MEDICATION      Rocehin (2)                             PT Assessment/Plan       11/08/17 0955 11/08/17 0849    PT Assessment    Assessment Comments Tolerates light stretching well with light core work.  -SP Pt has not done much exercise and fatigues easily-  established light stretch/strengthen program for HEP with handouts   -SP    PT Plan    PT Frequency 1x/week  -SP 2x/week  -SP    PT Plan Comments Continue with POC monitor response with HEP--pt has both LUPUS and osteoporosis so was cautious with exercises for HEP  -SP Continue with stretches monitor pt response with stretches   -SP      User Key  (r) = Recorded By, (t) = Taken By, (c) = Cosigned By    Initials Name Provider Type    JACKSON Gregg PTA Physical Therapy Assistant                Modalities       11/08/17 0800          Moist Heat    MH Applied Yes  -SP      Location --   low back  -SP      Rx Minutes 15 mins  -SP      MH Prior to Rx Yes  -SP        User Key  (r) = Recorded By, (t) = Taken By, (c) = Cosigned By    Initials Name Provider Type    JACKSON Gregg PTA Physical Therapy Assistant                Exercises       11/08/17 0800          Subjective Comments    Subjective Comments No change about the same  -SP      Subjective Pain    Able to rate subjective pain? yes  -SP      Pre-Treatment Pain Level 5  -SP      Subjective Pain Comment Has a brace at home that seems to help some   denies use of ice or heat for relief  -SP      Aquatics    Aquatics performed? No  -SP      Exercise 1    Exercise Name 1 tball LTR  -SP      Reps 1 10  -SP      Exercise 2    Exercise Name 2 tball ham curls  -SP      Sets 2 2  -SP      Reps 2 10  -SP      Exercise 3    Exercise Name 3 DKTC S  -SP      Reps 3 3  -SP      Time (Seconds) 3 30 sec  -SP      Exercise 4    Exercise Name 4 tball ham sets  -SP      Reps 4 10  -SP      Time (Seconds) 4 10 sec  -SP      Exercise 5     Exercise Name 5 hip add   -SP      Reps 5 10  -SP      Time (Seconds) 5 10 sec  -SP      Exercise 6    Exercise Name 6 tball piriformis S  -SP      Reps 6 3  -SP      Time (Seconds) 6 30 sec  -SP      Exercise 7    Exercise Name 7 midback S  -SP      Reps 7 3  -SP      Time (Seconds) 7 30 sec  -SP        User Key  (r) = Recorded By, (t) = Taken By, (c) = Cosigned By    Initials Name Provider Type    JACKSON Gregg, PTA Physical Therapy Assistant                               PT OP Goals       11/08/17 0955 11/08/17 0800    PT Short Term Goals    STG Date to Achieve  11/22/17  -SP    STG 1  Pt will have no greater than 4/10 pain in lower back at all times.  -SP    STG 1 Progress  Not Met  -SP    STG 2  Pt will have 4-/5 or greater strength in B LEs in all regards  -SP    STG 2 Progress  Ongoing  -SP    STG 3  Pt will have 5 degree or greater decrease in lumbar ROM deficts  -SP    STG 3 Progress  Ongoing  -SP    STG 4  Pt will be able to ambulate 220' or greater intervals with least restrictive AD Modified Independent  -SP    STG 4 Progress  Ongoing  -SP    Long Term Goals    LTG Date to Achieve  12/13/17  -SP    LTG 1  Pt will have no greater than 1/10 pain in lower back at all times.  -SP    LTG 1 Progress  Not Met  -SP    LTG 2  Pt will have 4+/5 or greater strnegth in B LEs in all regards  -SP    LTG 2 Progress  Not Met  -SP    LTG 3  Pt will have lumbar ROM WFLs  -SP    LTG 3 Progress  Ongoing  -SP    LTG 4  Pt will be able to ambulate 500' intervals or longer with least restrictive AD Modified Independent.  -SP    LTG 4 Progress  Not Met  -SP    LTG 5  Pt will be D/C'd to Fitness Program  -SP    LTG 5 Progress  Not Met  -SP    LTG 6  Pt will have 12 point or greater improvement in Modified Oswestry score.  -SP    LTG 6 Progress  Not Met  -SP    Time Calculation    PT Goal Re-Cert Due Date 11/13/17  -SP 11/22/17  -SP      User Key  (r) = Recorded By, (t) = Taken By, (c) = Cosigned By    Initials Name Provider  Type    SP Rosita Gregg PTA Physical Therapy Assistant                    Time Calculation:   Start Time: 0802  Stop Time: 0845  Time Calculation (min): 43 min  PT Non-Billable Time (min): 13 min  Total Timed Code Minutes- PT: 30 minute(s)    Therapy Charges for Today     Code Description Service Date Service Provider Modifiers Qty    54901442784 HC PT THER PROC EA 15 MIN 11/8/2017 Rosita Gregg PTA GP 2    64794310282 HC PT THER SUPP EA 15 MIN 11/8/2017 Rosita Gregg PTA GP 1                    Rosita Gregg PTA  11/8/2017

## 2017-11-15 ENCOUNTER — HOSPITAL ENCOUNTER (OUTPATIENT)
Dept: PHYSICAL THERAPY | Facility: HOSPITAL | Age: 72
Setting detail: THERAPIES SERIES
Discharge: HOME | End: 2017-11-15

## 2017-11-15 DIAGNOSIS — M54.42 ACUTE BACK PAIN WITH SCIATICA, LEFT: Primary | ICD-10-CM

## 2017-11-15 PROCEDURE — 97110 THERAPEUTIC EXERCISES: CPT

## 2017-11-15 NOTE — THERAPY TREATMENT NOTE
Outpatient Physical Therapy Ortho Treatment Note  [facility]     Patient Name: Mary Anne Wolf  : 1945  MRN: [id number]  Today's Date: 11/15/2017      Visit Date: 11/15/2017  Pt has attended 3/3 visits  MD PRN  REcert 17  6 visits approved through 17  Schedule recert next week  Visit Dx:    ICD-10-CM ICD-9-CM   1. Acute back pain with sciatica, left M54.42 724.3       Patient Active Problem List   Diagnosis   • Essential hypertension   • Neuropathy   • Seizure disorder   • Vertigo   • Systemic lupus erythematosus   • Borderline glaucoma   • Nuclear cataract   • Long-term use of hydroxychloroquine   • Osteoporosis, unspecified        Past Medical History:   Diagnosis Date   • Acute posthemorrhagic anemia    • Backache     LLE radiculopathy   • Cigarette smoker    • Discoid lupus erythematosus    • Diverticular disease of colon     incomplete colonoscopy to Hepatic flexure.  Rec ACBE, pt. considering   • Dizziness and giddiness    • Dysphagia    • Esophagitis     grade III   • Essential (primary) hypertension    • Essential hypertension    • Essential hypertension    • Gastroesophageal reflux disease     rec pt try RX prilosec   • H/O screening mammography    • Localization-related (focal) (partial) symptomatic epilepsy and epileptic syndromes with complex partial seizures, intractable, without status epilepticus    • Neuropathy    • Neuropathy    • Nicotine dependence    • Osteopenia    • Periumbilical pain    • Seizure disorder    • Seizure disorder     history of complex partial seizure   • Stricture of esophagus    • Systemic lupus erythematosus    • Systemic lupus erythematosus    • Vertigo    • Vertigo         Past Surgical History:   Procedure Laterality Date   • COLONOSCOPY  2015   • COLONOSCOPY  2014    REFUSED BY PATIENT   • COLONOSCOPY  2015    a diverticulum was found in the sigmoid colon   • ENDOSCOPY  2015    Esophageal stricture was present.  Dilatatin was performed. Esophagitis . Biopsy taken. Normal stomach. Normal duodenum   • INJECTION OF MEDICATION      Rocehin (2)                             PT Assessment/Plan       11/15/17 0929       PT Assessment    Assessment Comments Tolerates light exercises and stretches well does need rests frequently  -SP     PT Plan    PT Frequency 2x/week  -SP     PT Plan Comments Continue with POC monitor response with pro 2  (has arthritic Left knee)  -SP       User Key  (r) = Recorded By, (t) = Taken By, (c) = Cosigned By    Initials Name Provider Type    JACKSON Gregg PTA Physical Therapy Assistant                Modalities       11/15/17 0800          Moist Heat    MH Applied Yes  -SP      Location --   low back  -SP      Rx Minutes 15 mins  -SP      MH Prior to Rx Yes  -SP        User Key  (r) = Recorded By, (t) = Taken By, (c) = Cosigned By    Initials Name Provider Type    JACKSON Gregg PTA Physical Therapy Assistant                Exercises       11/15/17 0800          Subjective Comments    Subjective Comments Little more painful today has been doing exercises likes exercises  -SP      Subjective Pain    Able to rate subjective pain? yes  -SP      Pre-Treatment Pain Level 8  -SP      Post-Treatment Pain Level 6  -SP      Aquatics    Aquatics performed? No  -SP      Exercise 1    Exercise Name 1 midback S on tball seated  -SP      Reps 1 3  -SP      Time (Seconds) 1 30 sec  -SP      Exercise 2    Exercise Name 2 tball LTR  -SP      Reps 2 10  -SP      Exercise 3    Exercise Name 3 tball ham sets  -SP      Reps 3 10  -SP      Time (Seconds) 3 10 sec  -SP      Exercise 4    Exercise Name 4 tball ham curls  -SP      Sets 4 2  -SP      Reps 4 10  -SP      Exercise 5    Exercise Name 5 tball piriformis S B  -SP      Reps 5 3  -SP      Time (Seconds) 5 30 sec  -SP      Exercise 6    Exercise Name 6 tball hip add with pelvis tilts  -SP      Sets 6 2  -SP      Reps 6 10  -SP      Exercise 7    Exercise Name  7 Pro 2 seat 8 Level 2  -SP      Time (Minutes) 7 5 min  -SP      Exercise 8    Exercise Name 8 Incline S HS S  -SP      Reps 8 3  -SP      Time (Seconds) 8 30 sec  -SP        User Key  (r) = Recorded By, (t) = Taken By, (c) = Cosigned By    Initials Name Provider Type    SP Rosita Gregg, PTA Physical Therapy Assistant                               PT OP Goals       11/15/17 0932 11/15/17 0800    PT Short Term Goals    STG Date to Achieve  11/22/17  -SP    STG 1  Pt will have no greater than 4/10 pain in lower back at all times.  -SP    STG 1 Progress  Not Met  -SP    STG 2  Pt will have 4-/5 or greater strength in B LEs in all regards  -SP    STG 2 Progress  Ongoing  -SP    STG 3  Pt will have 5 degree or greater decrease in lumbar ROM deficts  -SP    STG 3 Progress  Ongoing  -SP    STG 4  Pt will be able to ambulate 220' or greater intervals with least restrictive AD Modified Independent  -SP    STG 4 Progress  Ongoing  -SP    Long Term Goals    LTG Date to Achieve  12/13/17  -SP    LTG 1  Pt will have no greater than 1/10 pain in lower back at all times.  -SP    LTG 1 Progress  Not Met  -SP    LTG 2  Pt will have 4+/5 or greater strnegth in B LEs in all regards  -SP    LTG 2 Progress  Not Met  -SP    LTG 3  Pt will have lumbar ROM WFLs  -SP    LTG 3 Progress  Ongoing  -SP    LTG 4  Pt will be able to ambulate 500' intervals or longer with least restrictive AD Modified Independent.  -SP    LTG 4 Progress  Not Met  -SP    LTG 5  Pt will be D/C'd to Fitness Program  -SP    LTG 5 Progress  Not Met  -SP    LTG 6  Pt will have 12 point or greater improvement in Modified Oswestry score.  -SP    LTG 6 Progress  Not Met  -SP    Time Calculation    PT Goal Re-Cert Due Date 12/07/17  -SP 12/07/17  -SP      User Key  (r) = Recorded By, (t) = Taken By, (c) = Cosigned By    Initials Name Provider Type    SP Rosita Gregg, PTA Physical Therapy Assistant                    Time Calculation:   Start Time: 0832  Stop Time:  0926  Time Calculation (min): 54 min  PT Non-Billable Time (min): 15 min  Total Timed Code Minutes- PT: 39 minute(s)    Therapy Charges for Today     Code Description Service Date Service Provider Modifiers Qty    79787609922 HC PT THER PROC EA 15 MIN 11/15/2017 Rosita Gregg PTA GP 3    00130303633 HC PT THER SUPP EA 15 MIN 11/15/2017 Rosita Gregg PTA GP 1                    Rosita Gregg PTA  11/15/2017

## 2017-11-17 ENCOUNTER — OFFICE VISIT (OUTPATIENT)
Dept: FAMILY MEDICINE CLINIC | Facility: CLINIC | Age: 72
End: 2017-11-17

## 2017-11-17 VITALS
WEIGHT: 100.5 LBS | HEART RATE: 96 BPM | OXYGEN SATURATION: 98 % | BODY MASS INDEX: 15.78 KG/M2 | SYSTOLIC BLOOD PRESSURE: 116 MMHG | HEIGHT: 67 IN | DIASTOLIC BLOOD PRESSURE: 64 MMHG

## 2017-11-17 DIAGNOSIS — R51.9 CHRONIC NONINTRACTABLE HEADACHE, UNSPECIFIED HEADACHE TYPE: Primary | ICD-10-CM

## 2017-11-17 DIAGNOSIS — G89.29 CHRONIC NONINTRACTABLE HEADACHE, UNSPECIFIED HEADACHE TYPE: Primary | ICD-10-CM

## 2017-11-17 DIAGNOSIS — G40.909 SEIZURE DISORDER (HCC): ICD-10-CM

## 2017-11-17 DIAGNOSIS — G62.9 NEUROPATHY: ICD-10-CM

## 2017-11-17 DIAGNOSIS — R53.82 CHRONIC FATIGUE: ICD-10-CM

## 2017-11-17 DIAGNOSIS — H40.003 BORDERLINE GLAUCOMA OF BOTH EYES: ICD-10-CM

## 2017-11-17 DIAGNOSIS — K21.9 GASTROESOPHAGEAL REFLUX DISEASE WITHOUT ESOPHAGITIS: ICD-10-CM

## 2017-11-17 DIAGNOSIS — R42 VERTIGO: ICD-10-CM

## 2017-11-17 PROCEDURE — 99214 OFFICE O/P EST MOD 30 MIN: CPT | Performed by: FAMILY MEDICINE

## 2017-11-17 RX ORDER — DIVALPROEX SODIUM 500 MG/1
500 TABLET, DELAYED RELEASE ORAL DAILY
Qty: 30 TABLET | Refills: 11 | Status: SHIPPED | OUTPATIENT
Start: 2017-11-17 | End: 2018-10-04

## 2017-11-17 RX ORDER — SUCRALFATE ORAL 1 G/10ML
1 SUSPENSION ORAL 4 TIMES DAILY
Qty: 420 ML | Refills: 1 | Status: SHIPPED | OUTPATIENT
Start: 2017-11-17 | End: 2017-11-20

## 2017-11-17 RX ORDER — DEXLANSOPRAZOLE 30 MG/1
30 CAPSULE, DELAYED RELEASE ORAL DAILY
Qty: 30 CAPSULE | Refills: 5 | Status: SHIPPED | OUTPATIENT
Start: 2017-11-17 | End: 2018-02-19

## 2017-11-17 RX ORDER — FLUTICASONE PROPIONATE 50 MCG
2 SPRAY, SUSPENSION (ML) NASAL DAILY
Qty: 1 BOTTLE | Refills: 11 | Status: SHIPPED | OUTPATIENT
Start: 2017-11-17 | End: 2019-02-14 | Stop reason: SDUPTHER

## 2017-11-17 RX ORDER — MECLIZINE HYDROCHLORIDE 25 MG/1
25 TABLET ORAL 3 TIMES DAILY PRN
Qty: 30 TABLET | Refills: 2 | Status: SHIPPED | OUTPATIENT
Start: 2017-11-17 | End: 2018-02-19 | Stop reason: SDUPTHER

## 2017-11-17 RX ORDER — CETIRIZINE HYDROCHLORIDE 10 MG/1
10 TABLET ORAL DAILY
Qty: 30 TABLET | Refills: 11 | Status: SHIPPED | OUTPATIENT
Start: 2017-11-17

## 2017-11-17 RX ORDER — AMLODIPINE BESYLATE 5 MG/1
5 TABLET ORAL DAILY
Qty: 30 TABLET | Refills: 11 | Status: SHIPPED | OUTPATIENT
Start: 2017-11-17 | End: 2018-02-19 | Stop reason: SDUPTHER

## 2017-11-17 RX ORDER — GABAPENTIN 300 MG/1
300 CAPSULE ORAL 3 TIMES DAILY
Qty: 90 CAPSULE | Refills: 5 | Status: SHIPPED | OUTPATIENT
Start: 2017-11-17 | End: 2018-05-21 | Stop reason: SDUPTHER

## 2017-11-17 NOTE — PROGRESS NOTES
Subjective   Chief Complaint   Patient presents with   • Follow-up     meds check       Mary Anne Wolf is a 72 y.o. female who presents for Follow-up (meds check)   Headache    This is a chronic problem. The current episode started more than 1 month ago. The problem occurs intermittently. The problem has been waxing and waning. The pain is located in the frontal region. The pain does not radiate. The quality of the pain is described as aching. Associated symptoms include sinus pressure. Pertinent negatives include no abdominal pain, blurred vision, dizziness, eye pain, eye redness, eye watering, fever, nausea, neck pain, numbness, phonophobia, photophobia, seizures, vomiting or weight loss. The symptoms are aggravated by exposure to cold air. She has tried acetaminophen for the symptoms. The treatment provided mild relief.   Heartburn   She complains of heartburn. She reports no abdominal pain, no chest pain or no nausea. The current episode started more than 1 year ago. The problem has been waxing and waning. The symptoms are aggravated by certain foods. Associated symptoms include fatigue. Pertinent negatives include no melena or weight loss. Decreased appetite. She has tried a PPI for the symptoms. The treatment provided mild relief.     She also needs her annual eye exam    She wants to get back on gabapentin as lyrica is not helping with her feet    No seizures    The following portions of the patient's history were reviewed and updated as appropriate:problem list, current medications, allergies, past family history, past medical history, past social history and past surgical history    Past Medical History:   Diagnosis Date   • Acute posthemorrhagic anemia    • Backache     LLE radiculopathy   • Cigarette smoker    • Discoid lupus erythematosus    • Diverticular disease of colon     incomplete colonoscopy to Hepatic flexure.  Rec ACBE, pt. considering   • Dizziness and giddiness    • Dysphagia    •  Esophagitis     grade III   • Essential (primary) hypertension    • Essential hypertension    • Essential hypertension    • Gastroesophageal reflux disease     rec pt try RX prilosec   • H/O screening mammography    • Localization-related (focal) (partial) symptomatic epilepsy and epileptic syndromes with complex partial seizures, intractable, without status epilepticus    • Neuropathy    • Neuropathy    • Nicotine dependence    • Osteopenia    • Periumbilical pain    • Seizure disorder    • Seizure disorder     history of complex partial seizure   • Stricture of esophagus    • Systemic lupus erythematosus    • Systemic lupus erythematosus    • Vertigo    • Vertigo        Social History   Substance Use Topics   • Smoking status: Current Every Day Smoker   • Smokeless tobacco: Never Used   • Alcohol use No     History   Sexual Activity   • Sexual activity: Defer       Medications:  Outpatient Medications Prior to Visit   Medication Sig Dispense Refill   • Cyanocobalamin (VITAMIN B-12 ER) 1500 MCG tablet controlled-release Take 1 tablet by mouth Daily.     • denosumab (PROLIA) 60 MG/ML solution syringe Inject 60 mg under the skin 1 (One) Time.     • hydroxychloroquine (PLAQUENIL) 200 MG tablet Take 1 tablet by mouth Daily. 30 tablet 2   • amLODIPine (NORVASC) 5 MG tablet Take 1 tablet by mouth Daily. 30 tablet 5   • baclofen (LIORESAL) 10 MG tablet Take 10 mg by mouth 3 (Three) Times a Day.     • cetirizine (zyrTEC) 10 MG tablet Take 1 tablet by mouth Daily. 30 tablet 11   • divalproex (DEPAKOTE) 500 MG DR tablet Take 1 tablet by mouth Daily. 30 tablet 5   • famotidine (PEPCID) 40 MG tablet Take 1 tablet by mouth Daily. 30 tablet 11   • gabapentin (NEURONTIN) 300 MG capsule Take 1 capsule by mouth 3 (Three) Times a Day. 90 capsule 3   • HYDROcodone-acetaminophen (NORCO) 5-325 MG per tablet Take 1 tablet by mouth Every 6 (Six) Hours As Needed for Moderate Pain (4-6). 20 tablet 0   • meclizine (ANTIVERT) 25 MG tablet  "Take 1 tablet by mouth 3 (Three) Times a Day As Needed for dizziness. 30 tablet 2   • pregabalin (LYRICA) 75 MG capsule Take 1 capsule by mouth 2 (Two) Times a Day. 60 capsule 2     No facility-administered medications prior to visit.        Allergies   Allergen Reactions   • Milk-Related Compounds        Review of Systems   Constitutional: Positive for fatigue. Negative for fever, unexpected weight change and weight loss.   HENT: Positive for sinus pressure.    Eyes: Negative.  Negative for blurred vision, photophobia, pain, redness and visual disturbance.   Respiratory: Negative for shortness of breath.    Cardiovascular: Negative for chest pain, palpitations and leg swelling.   Gastrointestinal: Positive for heartburn. Negative for abdominal pain, constipation, diarrhea, melena, nausea and vomiting.   Endocrine: Negative.    Genitourinary: Negative.    Musculoskeletal: Negative.  Negative for neck pain.   Skin: Negative.    Neurological: Positive for headaches. Negative for dizziness, seizures and numbness.   Psychiatric/Behavioral: Negative for dysphoric mood and sleep disturbance.       Objective   Visit Vitals   • /64 (BP Location: Left arm, Patient Position: Sitting, Cuff Size: Large Adult)   • Pulse 96   • Ht 67\" (170.2 cm)   • Wt 100 lb 8 oz (45.6 kg)   • LMP  (LMP Unknown)   • SpO2 98%   • BMI 15.74 kg/m2       Physical Exam   Constitutional: She is oriented to person, place, and time. She appears well-developed and well-nourished. No distress.   HENT:   Head: Normocephalic.   Right Ear: Tympanic membrane, external ear and ear canal normal.   Left Ear: External ear and ear canal normal.   Nose: Nose normal. Right sinus exhibits no maxillary sinus tenderness and no frontal sinus tenderness. Left sinus exhibits no maxillary sinus tenderness and no frontal sinus tenderness.   Mouth/Throat: Oropharynx is clear and moist.   Eyes: Conjunctivae and EOM are normal. Pupils are equal, round, and reactive to " light. Right eye exhibits no discharge. Left eye exhibits no discharge.   Neck: Normal range of motion.   Cardiovascular: Normal rate, regular rhythm and normal heart sounds.  Exam reveals no gallop and no friction rub.    No murmur heard.  Pulmonary/Chest: Effort normal and breath sounds normal. She has no wheezes. She has no rales.   Musculoskeletal: She exhibits no edema.   Neurological: She is alert and oriented to person, place, and time. No cranial nerve deficit.   No focal deficits   Skin: She is not diaphoretic.   Psychiatric: She has a normal mood and affect. Her behavior is normal.   Nursing note and vitals reviewed.      Assessment/Plan   Mary Anne Wolf is a 72 y.o. female seen today for the followin. Chronic nonintractable headache, unspecified headache type  Suspect allergic rhinitis/chronic sinusitis. Will start flonase. If not improved in 1 week, she will call and I will refer her to neuro    - Basic Metabolic Panel    2. Neuropathy  Taper off lyrica and restart gabapentin. Discussed how to taper lyrica    - gabapentin (NEURONTIN) 300 MG capsule; Take 1 capsule by mouth 3 (Three) Times a Day.  Dispense: 90 capsule; Refill: 5    3. Gastroesophageal reflux disease without esophagitis  Trial of dexilant daily and carafate prn  If not improved in 1 week, will need to see GI. This was discussed    - dexlansoprazole (DEXILANT) 30 MG capsule; Take 1 capsule by mouth Daily.  Dispense: 30 capsule; Refill: 5  - sucralfate (CARAFATE) 1 GM/10ML suspension; Take 10 mL by mouth 4 (Four) Times a Day.  Dispense: 420 mL; Refill: 1    4. Vertigo    - meclizine (ANTIVERT) 25 MG tablet; Take 1 tablet by mouth 3 (Three) Times a Day As Needed for dizziness.  Dispense: 30 tablet; Refill: 2    5. Borderline glaucoma of both eyes    - Ambulatory Referral to Ophthalmology    6. Seizure disorder    - Valproic Acid Level, Free    7. Chronic fatigue    - T4, Free  - TSH      Follow up: Return in about 3 months (around  2/17/2018) for Recheck.          This document has been electronically signed by Bess Suárez DO on November 17, 2017 1:00 PM

## 2017-11-17 NOTE — PATIENT INSTRUCTIONS
If your headaches do not resolve in 1 week, please call me. If your nausea and reflux do not resolve with the new medication, please call me.

## 2017-11-20 ENCOUNTER — APPOINTMENT (OUTPATIENT)
Dept: PHYSICAL THERAPY | Facility: HOSPITAL | Age: 72
End: 2017-11-20

## 2017-11-20 RX ORDER — SUCRALFATE 1 G/1
1 TABLET ORAL 4 TIMES DAILY PRN
Qty: 120 TABLET | Refills: 1 | Status: SHIPPED | OUTPATIENT
Start: 2017-11-20 | End: 2018-05-21 | Stop reason: SDUPTHER

## 2017-11-21 ENCOUNTER — HOSPITAL ENCOUNTER (OUTPATIENT)
Dept: PHYSICAL THERAPY | Facility: HOSPITAL | Age: 72
Setting detail: THERAPIES SERIES
Discharge: HOME | End: 2017-11-21

## 2017-11-21 DIAGNOSIS — M54.42 ACUTE BACK PAIN WITH SCIATICA, LEFT: Primary | ICD-10-CM

## 2017-11-21 DIAGNOSIS — R26.89 BALANCE PROBLEM: ICD-10-CM

## 2017-11-21 PROCEDURE — 97110 THERAPEUTIC EXERCISES: CPT

## 2017-11-21 NOTE — THERAPY TREATMENT NOTE
"    Outpatient Physical Therapy Ortho Treatment Note  [facility]     Patient Name: Mary Anne Wolf  : 1945  MRN: [id number]  Today's Date: 2017      Visit Date: 2017     Subjective Improvement \"a little\"    Visits 3/3  Visits approved 6 until 2017  RTMD PRN  Recert Date 2017    Low Back pain with sciatica    Visit Dx:    ICD-10-CM ICD-9-CM   1. Acute back pain with sciatica, left M54.42 724.3   2. Balance problem R26.89 781.99       Patient Active Problem List   Diagnosis   • Essential hypertension   • Neuropathy   • Seizure disorder   • Vertigo   • Systemic lupus erythematosus   • Borderline glaucoma   • Nuclear cataract   • Long-term use of hydroxychloroquine   • Osteoporosis, unspecified        Past Medical History:   Diagnosis Date   • Acute posthemorrhagic anemia    • Backache     LLE radiculopathy   • Cigarette smoker    • Discoid lupus erythematosus    • Diverticular disease of colon     incomplete colonoscopy to Hepatic flexure.  Rec ACBE, pt. considering   • Dizziness and giddiness    • Dysphagia    • Esophagitis     grade III   • Essential (primary) hypertension    • Essential hypertension    • Essential hypertension    • Gastroesophageal reflux disease     rec pt try RX prilosec   • H/O screening mammography    • Localization-related (focal) (partial) symptomatic epilepsy and epileptic syndromes with complex partial seizures, intractable, without status epilepticus    • Neuropathy    • Neuropathy    • Nicotine dependence    • Osteopenia    • Periumbilical pain    • Seizure disorder    • Seizure disorder     history of complex partial seizure   • Stricture of esophagus    • Systemic lupus erythematosus    • Systemic lupus erythematosus    • Vertigo    • Vertigo         Past Surgical History:   Procedure Laterality Date   • COLONOSCOPY  2015   • COLONOSCOPY  2014    REFUSED BY PATIENT   • COLONOSCOPY  2015    a diverticulum was found in the sigmoid " colon   • ENDOSCOPY  08/05/2015    Esophageal stricture was present. Dilatatin was performed. Esophagitis . Biopsy taken. Normal stomach. Normal duodenum   • INJECTION OF MEDICATION      Rocehin (2)             PT Ortho       11/21/17 1100    Subjective Comments    Subjective Comments Patient states that she has a lot of pain after sitting for a long period of time.  -CP    Precautions and Contraindications    Precautions/Limitations fall precautions  -CP    Precautions Lupus and Osteoporosis  -CP    Subjective Pain    Able to rate subjective pain? yes  -CP    Pre-Treatment Pain Level 6  -CP    Post-Treatment Pain Level 4  -CP    Posture/Observations    Posture/Observations Comments forward flex at trunk.  Little motion at trunk with amb, decrease arm swing  -CP    Gait Assessment/Treatment    Gait, Dawson Level independent  -CP      User Key  (r) = Recorded By, (t) = Taken By, (c) = Cosigned By    Initials Name Provider Type    GUALBERTO Patel PTA Physical Therapy Assistant                            PT Assessment/Plan       11/21/17 1120       PT Assessment    Assessment Comments Paula tolerated RX well with no increase pain with ex  -CP     PT Plan    PT Frequency 1x/week  -CP     Predicted Duration of Therapy Intervention (days/wks) 4 weeks  -CP     PT Plan Comments Cont with Poc  -CP       User Key  (r) = Recorded By, (t) = Taken By, (c) = Cosigned By    Initials Name Provider Type    GUALBERTO Patel PTA Physical Therapy Assistant                    Exercises       11/21/17 1100          Subjective Comments    Subjective Comments Patient states that she has a lot of pain after sitting for a long period of time.  -CP      Subjective Pain    Able to rate subjective pain? yes  -CP      Pre-Treatment Pain Level 6  -CP      Post-Treatment Pain Level 4  -CP        User Key  (r) = Recorded By, (t) = Taken By, (c) = Cosigned By    Initials Name Provider Type    GUALBERTO Patel PTA Physical  Therapy Assistant                               PT OP Goals       11/21/17 1100       PT Short Term Goals    STG Date to Achieve 11/22/17  -CP     STG 1 Pt will have no greater than 4/10 pain in lower back at all times.  -CP     STG 1 Progress Not Met  -CP     STG 2 Pt will have 4-/5 or greater strength in B LEs in all regards  -CP     STG 2 Progress Ongoing  -CP     STG 3 Pt will have 5 degree or greater decrease in lumbar ROM deficts  -CP     STG 3 Progress Ongoing  -CP     STG 4 Pt will be able to ambulate 220' or greater intervals with least restrictive AD Modified Independent  -CP     STG 4 Progress Ongoing  -CP     Long Term Goals    LTG Date to Achieve 12/13/17  -CP     LTG 1 Pt will have no greater than 1/10 pain in lower back at all times.  -CP     LTG 1 Progress Not Met  -CP     LTG 2 Pt will have 4+/5 or greater strnegth in B LEs in all regards  -CP     LTG 2 Progress Not Met  -CP     LTG 3 Pt will have lumbar ROM WFLs  -CP     LTG 3 Progress Ongoing  -CP     LTG 4 Pt will be able to ambulate 500' intervals or longer with least restrictive AD Modified Independent.  -CP     LTG 4 Progress Not Met  -CP     LTG 5 Pt will be D/C'd to Fitness Program  -CP     LTG 5 Progress Not Met  -CP     LTG 6 Pt will have 12 point or greater improvement in Modified Oswestry score.  -CP     LTG 6 Progress Not Met  -CP     Time Calculation    PT Goal Re-Cert Due Date 12/07/17  -CP       User Key  (r) = Recorded By, (t) = Taken By, (c) = Cosigned By    Initials Name Provider Type    CP Anisha Patel PTA Physical Therapy Assistant                Therapy Education       11/21/17 1118          Therapy Education    Given HEP  -CP      Program Reinforced  -CP      How Provided Verbal;Demonstration  -CP      Provided to Patient  -CP      Level of Understanding Verbalized;Demonstrated  -CP        User Key  (r) = Recorded By, (t) = Taken By, (c) = Cosigned By    Initials Name Provider Type    CP Anisha Patel PTA Physical  Therapy Assistant                Time Calculation:   Start Time: 0930  Stop Time: 1030  Time Calculation (min): 60 min  Total Timed Code Minutes- PT: 40 minute(s)    Therapy Charges for Today     Code Description Service Date Service Provider Modifiers Qty    54492230559 HC PT THER PROC EA 15 MIN 11/21/2017 Anisha Patel PTA GP 3    87136310550 HC PT THER SUPP EA 15 MIN 11/21/2017 Anisha Paetl PTA GP 1                    Anisha Patel PTA  11/21/2017

## 2017-11-27 ENCOUNTER — HOSPITAL ENCOUNTER (OUTPATIENT)
Dept: PHYSICAL THERAPY | Facility: HOSPITAL | Age: 72
Setting detail: THERAPIES SERIES
Discharge: HOME | End: 2017-11-27

## 2017-11-27 DIAGNOSIS — M54.42 ACUTE BACK PAIN WITH SCIATICA, LEFT: Primary | ICD-10-CM

## 2017-11-27 PROCEDURE — 97110 THERAPEUTIC EXERCISES: CPT

## 2017-11-27 PROCEDURE — G8978 MOBILITY CURRENT STATUS: HCPCS

## 2017-11-27 PROCEDURE — G8979 MOBILITY GOAL STATUS: HCPCS

## 2017-11-27 NOTE — THERAPY PROGRESS REPORT/RE-CERT
Outpatient Physical Therapy Ortho Progress Note  [facility]     Patient Name: Mary Anne Wolf  : 1945  MRN: [id number]  Today's Date: 2017      Visit Date: 2017  Subjective Improvement: 50%  Visit Number:   Insurance approval: 6 until 2017  MD Visit: PRN  Recert Date: 2017      Therapy Diagnosis: Chronic LBP and Balance Disorder secondary to Core and LE Weakness  Visit Dx:    ICD-10-CM ICD-9-CM   1. Acute back pain with sciatica, left M54.42 724.3       Patient Active Problem List   Diagnosis   • Essential hypertension   • Neuropathy   • Seizure disorder   • Vertigo   • Systemic lupus erythematosus   • Borderline glaucoma   • Nuclear cataract   • Long-term use of hydroxychloroquine   • Osteoporosis, unspecified        Past Medical History:   Diagnosis Date   • Acute posthemorrhagic anemia    • Backache     LLE radiculopathy   • Cigarette smoker    • Discoid lupus erythematosus    • Diverticular disease of colon     incomplete colonoscopy to Hepatic flexure.  Rec ACBE, pt. considering   • Dizziness and giddiness    • Dysphagia    • Esophagitis     grade III   • Essential (primary) hypertension    • Essential hypertension    • Essential hypertension    • Gastroesophageal reflux disease     rec pt try RX prilosec   • H/O screening mammography    • Localization-related (focal) (partial) symptomatic epilepsy and epileptic syndromes with complex partial seizures, intractable, without status epilepticus    • Neuropathy    • Neuropathy    • Nicotine dependence    • Osteopenia    • Periumbilical pain    • Seizure disorder    • Seizure disorder     history of complex partial seizure   • Stricture of esophagus    • Systemic lupus erythematosus    • Systemic lupus erythematosus    • Vertigo    • Vertigo         Past Surgical History:   Procedure Laterality Date   • COLONOSCOPY  2015   • COLONOSCOPY  2014    REFUSED BY PATIENT   • COLONOSCOPY  2015    a diverticulum  was found in the sigmoid colon   • ENDOSCOPY  08/05/2015    Esophageal stricture was present. Dilatatin was performed. Esophagitis . Biopsy taken. Normal stomach. Normal duodenum   • INJECTION OF MEDICATION      Rocehin (2)             PT Ortho       11/27/17 0900    ROM (Range of Motion)    General ROM Detail Lumbar: Flexion=95 degrees, Extension=20 degrees, L Sidebending=22 degrees, R Sidebending=15 degrees, L Rotation=64 degrees, R Rotation=64 degrees.  -AK    MMT (Manual Muscle Testing)    General MMT Assessment Detail LE strength is symmetrical bilaterally: Hip Flexion=4-/5, Hip Extension=4/5, Hip Abduction=4+/5, Knee Flexion=4/5, Knee Extension=4+/5, Ankle DF+PF=4+/5.  -AK      User Key  (r) = Recorded By, (t) = Taken By, (c) = Cosigned By    Initials Name Provider Type    HECTOR Duran, PT Physical Therapist                            PT Assessment/Plan       11/27/17 1147       PT Assessment    Assessment Comments Pt has thus far met 3 of 4 STGs while having made excellent progress towards remaining goals, has greatly improved B LE strength and lumbar ROM as compared to upon evaluation; PT recommends having pt finish current number of alotted visits and then D/C pt to free 1 month Fitness membership in order to continue strengthening independently.  -AK     PT Plan    PT Frequency 2x/week  -AK     Predicted Duration of Therapy Intervention (days/wks) 2 weeks  -AK     Physical Therapy Interventions (Optional Details) balance training;gross motor skills;home exercise program;postural re-education;patient/family education;neuromuscular re-education;motor coordination training;modalities;manual therapy techniques;lumbar stabilization;ROM (Range of Motion);strengthening;stretching;stair training  -AK       User Key  (r) = Recorded By, (t) = Taken By, (c) = Cosigned By    Initials Name Provider Type    HECTOR Duran PT Physical Therapist                Modalities       11/27/17 0900          Moist Heat     MH Applied Yes  -SP      Location --   low back  -SP      Rx Minutes 15 mins  -SP      MH Prior to Rx Yes  -SP        User Key  (r) = Recorded By, (t) = Taken By, (c) = Cosigned By    Initials Name Provider Type    JACKSON Gregg PTA Physical Therapy Assistant                Exercises       11/27/17 0900          Subjective Comments    Subjective Comments Notes not much change about the same  -SP      Subjective Pain    Able to rate subjective pain? yes  -SP      Pre-Treatment Pain Level 3  -SP      Post-Treatment Pain Level 2  -SP      Aquatics    Aquatics performed? No  -SP      Exercise 1    Exercise Name 1 pro 2 seat 7 level 3  -SP      Time (Minutes) 1 7 min  -SP      Exercise 2    Exercise Name 2 tball LTR  -SP      Reps 2 10  -SP      Exercise 3    Exercise Name 3 tball bridges  -SP      Sets 3 2  -SP      Reps 3 10  -SP      Exercise 4    Exercise Name 4 tball piriformis S  -SP      Reps 4 3  -SP      Time (Seconds) 4 30 sec  -SP      Exercise 5    Exercise Name 5 sit to stand holding ball  -SP      Sets 5 2  -SP      Reps 5 10  -SP      Additional Comments droped ball after 8 reps  -SP      Exercise 6    Exercise Name 6 PT took objective measures of strength and ROM  -AK        User Key  (r) = Recorded By, (t) = Taken By, (c) = Cosigned By    Initials Name Provider Type    JACKSON Gregg PTA Physical Therapy Assistant    HECTOR Duran, PT Physical Therapist                               PT OP Goals       11/27/17 1020 11/27/17 0930    PT Short Term Goals    STG Date to Achieve  11/22/17  -AK    STG 1  Pt will have no greater than 4/10 pain in lower back at all times.  -AK    STG 1 Progress  Progressing  -AK    STG 2  Pt will have 4-/5 or greater strength in B LEs in all regards  -AK    STG 2 Progress  Met  -AK    STG 3  Pt will have 5 degree or greater decrease in lumbar ROM deficts  -AK    STG 3 Progress  Met  -AK    STG 4  Pt will be able to ambulate 220' or greater intervals with least  restrictive AD Modified Independent  -AK    STG 4 Progress  Met  -AK    Long Term Goals    LTG Date to Achieve  12/13/17  -AK    LTG 1  Pt will have no greater than 1/10 pain in lower back at all times.  -AK    LTG 1 Progress  Not Met  -AK    LTG 2  Pt will have 4+/5 or greater strnegth in B LEs in all regards  -AK    LTG 2 Progress  Not Met  -AK    LTG 3  Pt will have lumbar ROM WFLs  -AK    LTG 3 Progress  Ongoing  -AK    LTG 4  Pt will be able to ambulate 500' intervals or longer with least restrictive AD Modified Independent.  -AK    LTG 4 Progress  Not Met  -AK    LTG 5  Pt will be D/C'd to Fitness Program  -AK    LTG 5 Progress  Not Met  -AK    LTG 6  Pt will have 12 point or greater improvement in Modified Oswestry score.  -AK    LTG 6 Progress  Not Met  -AK    Time Calculation    PT Goal Re-Cert Due Date 12/15/17  -SP       11/27/17 0900       PT Short Term Goals    STG Date to Achieve 11/22/17  -SP     STG 1 Pt will have no greater than 4/10 pain in lower back at all times.  -SP     STG 1 Progress Progressing  -SP     STG 2 Pt will have 4-/5 or greater strength in B LEs in all regards  -SP     STG 2 Progress Ongoing  -SP     STG 3 Pt will have 5 degree or greater decrease in lumbar ROM deficts  -SP     STG 3 Progress Ongoing  -SP     STG 4 Pt will be able to ambulate 220' or greater intervals with least restrictive AD Modified Independent  -SP     STG 4 Progress Ongoing  -SP     Long Term Goals    LTG Date to Achieve 12/13/17  -SP     LTG 1 Pt will have no greater than 1/10 pain in lower back at all times.  -SP     LTG 1 Progress Not Met  -SP     LTG 2 Pt will have 4+/5 or greater strnegth in B LEs in all regards  -SP     LTG 2 Progress Not Met  -SP     LTG 3 Pt will have lumbar ROM WFLs  -SP     LTG 3 Progress Ongoing  -SP     LTG 4 Pt will be able to ambulate 500' intervals or longer with least restrictive AD Modified Independent.  -SP     LTG 4 Progress Not Met  -SP     LTG 5 Pt will be D/C'd to Fitness  Program  -SP     LTG 5 Progress Not Met  -SP     LTG 6 Pt will have 12 point or greater improvement in Modified Oswestry score.  -SP     LTG 6 Progress Not Met  -SP     Time Calculation    PT Goal Re-Cert Due Date 12/07/17  -SP       User Key  (r) = Recorded By, (t) = Taken By, (c) = Cosigned By    Initials Name Provider Type    JACKSON Gregg, PTA Physical Therapy Assistant    HECTOR Duran, PT Physical Therapist                    Outcome Measures       11/27/17 0930          Modified Oswestry    Modified Oswestry Score/Comments 36%  -AK      Functional Assessment    Outcome Measure Options Modifed Owestry  -AK        User Key  (r) = Recorded By, (t) = Taken By, (c) = Cosigned By    Initials Name Provider Type    HECTOR Duran, JUD Physical Therapist            Time Calculation:   Start Time: 0950  Stop Time: 1000  Time Calculation (min): 10 min  PT Non-Billable Time (min): 10 min  Total Timed Code Minutes- PT: 10 minute(s)    Therapy Charges for Today     Code Description Service Date Service Provider Modifiers Qty    21897286113 HC PT MOBILITY CURRENT 11/27/2017 Matt Duran, PT GP, CJ 1    62741953816 HC PT MOBILITY PROJECTED 11/27/2017 Matt Duran, PT GP, CI 1    19117458504 HC PT THER PROC EA 15 MIN 11/27/2017 Matt Duran, PT GP 1          PT G-Codes  PT Professional Judgement Used?: Yes  Outcome Measure Options: Modifed Owestry  Score: 36%  Functional Limitation: Mobility: Walking and moving around  Mobility: Walking and Moving Around Current Status (): At least 20 percent but less than 40 percent impaired, limited or restricted  Mobility: Walking and Moving Around Goal Status (): At least 1 percent but less than 20 percent impaired, limited or restricted         Matt Duran, PT  11/27/2017

## 2017-12-06 ENCOUNTER — HOSPITAL ENCOUNTER (OUTPATIENT)
Dept: PHYSICAL THERAPY | Facility: HOSPITAL | Age: 72
Setting detail: THERAPIES SERIES
Discharge: HOME | End: 2017-12-06

## 2017-12-06 DIAGNOSIS — M54.42 ACUTE BACK PAIN WITH SCIATICA, LEFT: Primary | ICD-10-CM

## 2017-12-06 PROCEDURE — 97110 THERAPEUTIC EXERCISES: CPT

## 2017-12-06 NOTE — THERAPY DISCHARGE NOTE
Outpatient Physical Therapy Ortho Treatment Note/Discharge Summary  [facility]     Patient Name: Mary Anne Wolf  : 1945  MRN: [id number]  Today's Date: 2017      Visit Date: 2017  Pt. Has attended 5/5 visits  Subjective improvement 50 percent  MD PRN  Visit Dx:    ICD-10-CM ICD-9-CM   1. Acute back pain with sciatica, left M54.42 724.3       Patient Active Problem List   Diagnosis   • Essential hypertension   • Neuropathy   • Seizure disorder   • Vertigo   • Systemic lupus erythematosus   • Borderline glaucoma   • Nuclear cataract   • Long-term use of hydroxychloroquine   • Osteoporosis, unspecified        Past Medical History:   Diagnosis Date   • Acute posthemorrhagic anemia    • Backache     LLE radiculopathy   • Cigarette smoker    • Discoid lupus erythematosus    • Diverticular disease of colon     incomplete colonoscopy to Hepatic flexure.  Rec ACBE, pt. considering   • Dizziness and giddiness    • Dysphagia    • Esophagitis     grade III   • Essential (primary) hypertension    • Essential hypertension    • Essential hypertension    • Gastroesophageal reflux disease     rec pt try RX prilosec   • H/O screening mammography    • Localization-related (focal) (partial) symptomatic epilepsy and epileptic syndromes with complex partial seizures, intractable, without status epilepticus    • Neuropathy    • Neuropathy    • Nicotine dependence    • Osteopenia    • Periumbilical pain    • Seizure disorder    • Seizure disorder     history of complex partial seizure   • Stricture of esophagus    • Systemic lupus erythematosus    • Systemic lupus erythematosus    • Vertigo    • Vertigo         Past Surgical History:   Procedure Laterality Date   • COLONOSCOPY  2015   • COLONOSCOPY  2014    REFUSED BY PATIENT   • COLONOSCOPY  2015    a diverticulum was found in the sigmoid colon   • ENDOSCOPY  2015    Esophageal stricture was present. Dilatatin was performed.  Esophagitis . Biopsy taken. Normal stomach. Normal duodenum   • INJECTION OF MEDICATION      Rocehin (2)                             PT Assessment/Plan       12/06/17 1050       PT Assessment    Assessment Comments Tolerates exercises well has physioball at home and is declining fitness membership at this time.  Prefers to complete HEP at home  -SP     PT Plan    PT Plan Comments D/C with HEP  -SP       User Key  (r) = Recorded By, (t) = Taken By, (c) = Cosigned By    Initials Name Provider Type    JACKSON Gregg PTA Physical Therapy Assistant                Modalities       12/06/17 1000          Moist Heat    MH Applied Yes  -SP      Location low back  -SP      Rx Minutes 15 mins  -SP      MH S/P Rx Yes  -SP        User Key  (r) = Recorded By, (t) = Taken By, (c) = Cosigned By    Initials Name Provider Type    JACKSON Gregg PTA Physical Therapy Assistant                Exercises       12/06/17 1000          Subjective Comments    Subjective Comments Notes her back hurts today  -SP      Subjective Pain    Able to rate subjective pain? yes  -SP      Pre-Treatment Pain Level 9  -SP      Post-Treatment Pain Level 6  -SP      Aquatics    Aquatics performed? No  -SP      Exercise 1    Exercise Name 1 Pro 2 seat 7 level 3  -SP      Time (Minutes) 1 10 min  -SP      Exercise 2    Exercise Name 2 Incline S HS S  -SP      Reps 2 3  -SP      Time (Seconds) 2 30 sec  -SP      Exercise 3    Exercise Name 3 tball ham curls  -SP      Sets 3 2  -SP      Reps 3 10  -SP      Exercise 4    Exercise Name 4 tball bridge  -SP      Sets 4 2  -SP      Reps 4 10  -SP      Exercise 5    Exercise Name 5 bridge with add with SLR  -SP      Reps 5 10  -SP      Exercise 6    Exercise Name 6 tball LTR  -SP      Reps 6 10  -SP      Exercise 7    Exercise Name 7 tball midback S  -SP      Reps 7 3  -SP      Time (Seconds) 7 30 sec  -SP      Exercise 8    Exercise Name 8 LAQ with add  -SP      Sets 8 2  -SP      Reps 8 10  -SP       Exercise 9    Exercise Name 9 seated overhead diagonals both left to right and right to left ball lifts  -SP      Reps 9 5  -SP        User Key  (r) = Recorded By, (t) = Taken By, (c) = Cosigned By    Initials Name Provider Type    JACKSON Gregg, PTA Physical Therapy Assistant                               PT OP Goals       12/06/17 1125 12/06/17 1000    PT Short Term Goals    STG Date to Achieve  11/22/17  -SP    STG 1  Pt will have no greater than 4/10 pain in lower back at all times.  -SP    STG 1 Progress  Progressing  -SP    STG 2  Pt will have 4-/5 or greater strength in B LEs in all regards  -SP    STG 2 Progress  Met  -SP    STG 3  Pt will have 5 degree or greater decrease in lumbar ROM deficts  -SP    STG 3 Progress  Met  -SP    STG 4  Pt will be able to ambulate 220' or greater intervals with least restrictive AD Modified Independent  -SP    STG 4 Progress  Met  -SP    Long Term Goals    LTG Date to Achieve  12/13/17  -SP    LTG 1  Pt will have no greater than 1/10 pain in lower back at all times.  -SP    LTG 1 Progress  Not Met  -SP    LTG 2  Pt will have 4+/5 or greater strnegth in B LEs in all regards  -SP    LTG 2 Progress  Not Met  -SP    LTG 2 Progress Comments  4/5 throughout  -SP    LTG 3  Pt will have lumbar ROM WFLs  -SP    LTG 3 Progress  Met  -SP    LTG 3 Progress Comments  fwd bend to toes extension WNL  -SP    LTG 4  Pt will be able to ambulate 500' intervals or longer with least restrictive AD Modified Independent.  -SP    LTG 4 Progress  Met  -SP    LTG 5  Pt will be D/C'd to Fitness Program  -SP    LTG 5 Progress  Met  -SP    LTG 5 Progress Comments  Declines fitness membership prefers HEP  -SP    LTG 6  Pt will have 12 point or greater improvement in Modified Oswestry score.  -SP    LTG 6 Progress  Not Met  -SP    Time Calculation    PT Goal Re-Cert Due Date 12/06/17  -SP 12/15/17  -SP      User Key  (r) = Recorded By, (t) = Taken By, (c) = Cosigned By    Initials Name Provider Type     JACKSON Gregg PTA Physical Therapy Assistant                    Outcome Measures       12/06/17 1000          Modified Oswestry    Modified Oswestry Score/Comments 36 percent  -SP      Functional Assessment    Outcome Measure Options Modifed Owestry  -SP        User Key  (r) = Recorded By, (t) = Taken By, (c) = Cosigned By    Initials Name Provider Type    JACKSON Gregg PTA Physical Therapy Assistant            Time Calculation:   Start Time: 1015  Stop Time: 1100  Time Calculation (min): 45 min  PT Non-Billable Time (min): 15 min  Total Timed Code Minutes- PT: 30 minute(s)    Therapy Charges for Today     Code Description Service Date Service Provider Modifiers Qty    15747191847 HC PT THER PROC EA 15 MIN 12/6/2017 Rosita Gregg PTA GP 2    34811097563 HC PT THER SUPP EA 15 MIN 12/6/2017 Rosita Gregg PTA GP 1          PT G-Codes  Outcome Measure Options: Modifed Danna Gregg PTA  12/6/2017

## 2017-12-09 ENCOUNTER — HOSPITAL ENCOUNTER (EMERGENCY)
Facility: HOSPITAL | Age: 72
Discharge: HOME | End: 2017-12-09
Attending: EMERGENCY MEDICINE | Admitting: EMERGENCY MEDICINE

## 2017-12-09 ENCOUNTER — APPOINTMENT (OUTPATIENT)
Dept: GENERAL RADIOLOGY | Facility: HOSPITAL | Age: 72
End: 2017-12-09

## 2017-12-09 VITALS
DIASTOLIC BLOOD PRESSURE: 78 MMHG | OXYGEN SATURATION: 97 % | HEART RATE: 70 BPM | SYSTOLIC BLOOD PRESSURE: 144 MMHG | HEIGHT: 67 IN | TEMPERATURE: 97.8 F | RESPIRATION RATE: 18 BRPM | WEIGHT: 105 LBS | BODY MASS INDEX: 16.48 KG/M2

## 2017-12-09 DIAGNOSIS — M79.604 RIGHT LEG PAIN: Primary | ICD-10-CM

## 2017-12-09 LAB
BILIRUB UR QL STRIP: NEGATIVE
CLARITY UR: CLEAR
COLOR UR: YELLOW
GLUCOSE UR STRIP-MCNC: NEGATIVE MG/DL
HGB UR QL STRIP.AUTO: NEGATIVE
KETONES UR QL STRIP: NEGATIVE
LEUKOCYTE ESTERASE UR QL STRIP.AUTO: NEGATIVE
NITRITE UR QL STRIP: NEGATIVE
PH UR STRIP.AUTO: 7 [PH] (ref 5–9)
PROT UR QL STRIP: NEGATIVE
SP GR UR STRIP: 1 (ref 1–1.03)
UROBILINOGEN UR QL STRIP: NORMAL

## 2017-12-09 PROCEDURE — 25010000002 MORPHINE PER 10 MG: Performed by: EMERGENCY MEDICINE

## 2017-12-09 PROCEDURE — 96374 THER/PROPH/DIAG INJ IV PUSH: CPT

## 2017-12-09 PROCEDURE — 73502 X-RAY EXAM HIP UNI 2-3 VIEWS: CPT

## 2017-12-09 PROCEDURE — 99284 EMERGENCY DEPT VISIT MOD MDM: CPT

## 2017-12-09 PROCEDURE — 81003 URINALYSIS AUTO W/O SCOPE: CPT | Performed by: EMERGENCY MEDICINE

## 2017-12-09 RX ORDER — HYDROCODONE BITARTRATE AND ACETAMINOPHEN 5; 325 MG/1; MG/1
1 TABLET ORAL ONCE
Status: COMPLETED | OUTPATIENT
Start: 2017-12-09 | End: 2017-12-09

## 2017-12-09 RX ORDER — MORPHINE SULFATE 8 MG/ML
4 INJECTION INTRAMUSCULAR; INTRAVENOUS; SUBCUTANEOUS ONCE
Status: COMPLETED | OUTPATIENT
Start: 2017-12-09 | End: 2017-12-09

## 2017-12-09 RX ORDER — HYDROCODONE BITARTRATE AND ACETAMINOPHEN 5; 325 MG/1; MG/1
1 TABLET ORAL EVERY 6 HOURS PRN
Qty: 13 TABLET | Refills: 0 | Status: SHIPPED | OUTPATIENT
Start: 2017-12-09 | End: 2017-12-11 | Stop reason: HOSPADM

## 2017-12-09 RX ORDER — SODIUM CHLORIDE 0.9 % (FLUSH) 0.9 %
10 SYRINGE (ML) INJECTION AS NEEDED
Status: DISCONTINUED | OUTPATIENT
Start: 2017-12-09 | End: 2017-12-09 | Stop reason: HOSPADM

## 2017-12-09 RX ADMIN — HYDROCODONE BITARTRATE AND ACETAMINOPHEN 1 TABLET: 5; 325 TABLET ORAL at 11:05

## 2017-12-09 RX ADMIN — MORPHINE SULFATE 4 MG: 8 INJECTION, SOLUTION INTRAMUSCULAR; INTRAVENOUS at 08:47

## 2017-12-09 RX ADMIN — Medication 10 ML: at 08:47

## 2017-12-09 NOTE — ED PROVIDER NOTES
Subjective   History of Present Illness  72-year-old female with history of chronic low back pain issues after having a compression fracture in the past.  Family reports approximately one year ago although review of records suggest it was in the summer of this year.  She has been going to rehabilitation and completed her physical therapy on Wednesday.  Yesterday she started developing some right-sided symptoms with radiation of pain down her right leg right low back and gluteal area.  The pain got to the point she was unable to tolerate at home today prompting the visit to the emergency department.  She denies any new trauma or falls since completing her physical therapy.  They actually deny any falls since her initial injury causing the compression fracture.  She currently is taking Neurontin for pain control at home I expect secondary to known sciatica of the left lower extremity.  She is very tearful during the history secondary to pain.  Initially she told me she had back pain however after taking further history I suspect is only the right gluteal and leg pain.  Review of Systems   Constitutional: Negative for chills and fever.   HENT: Negative for rhinorrhea, sinus pressure and sneezing.    Eyes: Negative for pain and redness.   Respiratory: Negative for cough, chest tightness and shortness of breath.    Gastrointestinal: Negative for abdominal pain, diarrhea, nausea and vomiting.   Genitourinary: Negative for dysuria, flank pain, menstrual problem, pelvic pain, vaginal bleeding, vaginal discharge and vaginal pain.   Musculoskeletal:        Negative except for history of present illness   Skin: Negative for rash.   Neurological: Negative for dizziness, syncope, weakness, numbness and headaches.   Hematological: Negative.    Psychiatric/Behavioral: Negative for self-injury and suicidal ideas.       Past Medical History:   Diagnosis Date   • Acute posthemorrhagic anemia    • Backache     LLE radiculopathy   •  Cigarette smoker    • Discoid lupus erythematosus    • Diverticular disease of colon     incomplete colonoscopy to Hepatic flexure.  Rec ACBE, pt. considering   • Dizziness and giddiness    • Dysphagia    • Esophagitis     grade III   • Essential (primary) hypertension    • Essential hypertension    • Essential hypertension    • Gastroesophageal reflux disease     rec pt try RX prilosec   • H/O screening mammography    • Localization-related (focal) (partial) symptomatic epilepsy and epileptic syndromes with complex partial seizures, intractable, without status epilepticus    • Lupus    • Neuropathy    • Neuropathy    • Nicotine dependence    • Osteopenia    • Periumbilical pain    • Seizure disorder    • Seizure disorder     history of complex partial seizure   • Stricture of esophagus    • Systemic lupus erythematosus    • Systemic lupus erythematosus    • Vertigo    • Vertigo        Allergies   Allergen Reactions   • Milk-Related Compounds        Past Surgical History:   Procedure Laterality Date   • COLONOSCOPY  08/05/2015   • COLONOSCOPY  07/08/2014    REFUSED BY PATIENT   • COLONOSCOPY  08/05/2015    a diverticulum was found in the sigmoid colon   • ENDOSCOPY  08/05/2015    Esophageal stricture was present. Dilatatin was performed. Esophagitis . Biopsy taken. Normal stomach. Normal duodenum   • INJECTION OF MEDICATION      Rocehin (2)       Family History   Problem Relation Age of Onset   • Lupus Mother    • Rheum arthritis Mother    • Lupus Sister        Social History     Social History   • Marital status:      Spouse name: N/A   • Number of children: N/A   • Years of education: N/A     Social History Main Topics   • Smoking status: Current Every Day Smoker     Packs/day: 0.50     Types: Cigarettes   • Smokeless tobacco: Never Used      Comment: 2 a day   • Alcohol use No   • Drug use: No   • Sexual activity: Defer     Other Topics Concern   • None     Social History Narrative   • None            Objective   Physical Exam   Constitutional: She is oriented to person, place, and time. She appears well-developed and well-nourished.   HENT:   Head: Normocephalic and atraumatic.   Eyes: EOM are normal. Pupils are equal, round, and reactive to light.   Neck: Normal range of motion. Neck supple.   Cardiovascular: Normal rate, regular rhythm and normal heart sounds.    Pulmonary/Chest: Effort normal.   Abdominal: Soft. Bowel sounds are normal.   Musculoskeletal: Normal range of motion.   Neurological: She is alert and oriented to person, place, and time.   Skin: Skin is warm and dry.   Nursing note and vitals reviewed.      Procedures         ED Course  ED Course      XR Hip With or Without Pelvis 2 - 3 View Right   Final Result   Negative pelvis and right hip.      Electronically signed by:  Adalid Zamorano MD  12/9/2017 10:54 AM [facility]   Workstation: [id number]        Labs Reviewed   URINALYSIS W/ CULTURE IF INDICATED - Normal    Narrative:     Urine microscopic not indicated.                 MDM  Number of Diagnoses or Management Options  Right leg pain:   Diagnosis management comments: Atraumatic right-sided low back and leg pain.  No red flags for low back pain.  Imaging negative.  Urinalysis unremarkable.  Pain improved after single dose of morphine 4 mg.  I discussed the workup with the patient.  At this time and have a clear etiology for symptoms but do not suspect any serious etiology at this time.  We'll DC with a short course of Norco and have her follow-up with primary care doctor on Monday.      Final diagnoses:   Right leg pain            Misbah Browne MD  12/09/17 4833

## 2017-12-09 NOTE — DISCHARGE INSTRUCTIONS

## 2017-12-10 ENCOUNTER — APPOINTMENT (OUTPATIENT)
Dept: CT IMAGING | Facility: HOSPITAL | Age: 72
End: 2017-12-10

## 2017-12-10 ENCOUNTER — APPOINTMENT (OUTPATIENT)
Dept: GENERAL RADIOLOGY | Facility: HOSPITAL | Age: 72
End: 2017-12-10

## 2017-12-10 ENCOUNTER — HOSPITAL ENCOUNTER (OUTPATIENT)
Facility: HOSPITAL | Age: 72
Setting detail: OBSERVATION
Discharge: HOME HEALTH | End: 2017-12-11
Attending: FAMILY MEDICINE | Admitting: INTERNAL MEDICINE

## 2017-12-10 DIAGNOSIS — Z79.899 LONG-TERM USE OF HYDROXYCHLOROQUINE: ICD-10-CM

## 2017-12-10 DIAGNOSIS — R42 VERTIGO: ICD-10-CM

## 2017-12-10 DIAGNOSIS — M81.0 OSTEOPOROSIS, UNSPECIFIED OSTEOPOROSIS TYPE, UNSPECIFIED PATHOLOGICAL FRACTURE PRESENCE: ICD-10-CM

## 2017-12-10 DIAGNOSIS — G40.909 SEIZURE DISORDER (HCC): ICD-10-CM

## 2017-12-10 DIAGNOSIS — R11.2 INTRACTABLE VOMITING WITH NAUSEA, UNSPECIFIED VOMITING TYPE: Primary | ICD-10-CM

## 2017-12-10 DIAGNOSIS — IMO0002 NUCLEAR CATARACT: ICD-10-CM

## 2017-12-10 DIAGNOSIS — H40.003 BORDERLINE GLAUCOMA OF BOTH EYES: ICD-10-CM

## 2017-12-10 DIAGNOSIS — I10 ESSENTIAL HYPERTENSION: ICD-10-CM

## 2017-12-10 DIAGNOSIS — Z74.09 IMPAIRED PHYSICAL MOBILITY: ICD-10-CM

## 2017-12-10 DIAGNOSIS — G62.9 NEUROPATHY: ICD-10-CM

## 2017-12-10 LAB
ALBUMIN SERPL-MCNC: 4 G/DL (ref 3.4–4.8)
ALBUMIN/GLOB SERPL: 1 G/DL (ref 1.1–1.8)
ALP SERPL-CCNC: 73 U/L (ref 38–126)
ALT SERPL W P-5'-P-CCNC: 30 U/L (ref 9–52)
ANION GAP SERPL CALCULATED.3IONS-SCNC: 14 MMOL/L (ref 5–15)
AST SERPL-CCNC: 17 U/L (ref 14–36)
BASOPHILS # BLD AUTO: 0.01 10*3/MM3 (ref 0–0.2)
BASOPHILS NFR BLD AUTO: 0.1 % (ref 0–2)
BILIRUB SERPL-MCNC: 0.8 MG/DL (ref 0.2–1.3)
BILIRUB UR QL STRIP: NEGATIVE
BUN BLD-MCNC: 14 MG/DL (ref 7–21)
BUN/CREAT SERPL: 16.1 (ref 7–25)
CALCIUM SPEC-SCNC: 8.5 MG/DL (ref 8.4–10.2)
CHLORIDE SERPL-SCNC: 105 MMOL/L (ref 95–110)
CLARITY UR: CLEAR
CO2 SERPL-SCNC: 23 MMOL/L (ref 22–31)
COLOR UR: YELLOW
CREAT BLD-MCNC: 0.87 MG/DL (ref 0.5–1)
DEPRECATED RDW RBC AUTO: 47.1 FL (ref 36.4–46.3)
EOSINOPHIL # BLD AUTO: 0 10*3/MM3 (ref 0–0.7)
EOSINOPHIL NFR BLD AUTO: 0 % (ref 0–7)
ERYTHROCYTE [DISTWIDTH] IN BLOOD BY AUTOMATED COUNT: 15.6 % (ref 11.5–14.5)
GFR SERPL CREATININE-BSD FRML MDRD: 77 ML/MIN/1.73 (ref 39–90)
GLOBULIN UR ELPH-MCNC: 3.9 GM/DL (ref 2.3–3.5)
GLUCOSE BLD-MCNC: 83 MG/DL (ref 60–100)
GLUCOSE UR STRIP-MCNC: NEGATIVE MG/DL
HCT VFR BLD AUTO: 39.5 % (ref 35–45)
HGB BLD-MCNC: 13 G/DL (ref 12–15.5)
HGB UR QL STRIP.AUTO: NEGATIVE
HOLD SPECIMEN: NORMAL
HOLD SPECIMEN: NORMAL
IMM GRANULOCYTES # BLD: 0.04 10*3/MM3 (ref 0–0.02)
IMM GRANULOCYTES NFR BLD: 0.3 % (ref 0–0.5)
KETONES UR QL STRIP: ABNORMAL
LEUKOCYTE ESTERASE UR QL STRIP.AUTO: NEGATIVE
LIPASE SERPL-CCNC: 26 U/L (ref 23–300)
LYMPHOCYTES # BLD AUTO: 0.39 10*3/MM3 (ref 0.6–4.2)
LYMPHOCYTES NFR BLD AUTO: 3 % (ref 10–50)
MCH RBC QN AUTO: 27.3 PG (ref 26.5–34)
MCHC RBC AUTO-ENTMCNC: 32.9 G/DL (ref 31.4–36)
MCV RBC AUTO: 82.8 FL (ref 80–98)
MONOCYTES # BLD AUTO: 0.94 10*3/MM3 (ref 0–0.9)
MONOCYTES NFR BLD AUTO: 7.3 % (ref 0–12)
NEUTROPHILS # BLD AUTO: 11.51 10*3/MM3 (ref 2–8.6)
NEUTROPHILS NFR BLD AUTO: 89.3 % (ref 37–80)
NITRITE UR QL STRIP: NEGATIVE
PH UR STRIP.AUTO: 6 [PH] (ref 5–9)
PLATELET # BLD AUTO: 186 10*3/MM3 (ref 150–450)
PMV BLD AUTO: 11 FL (ref 8–12)
POTASSIUM BLD-SCNC: 3.7 MMOL/L (ref 3.5–5.1)
PROT SERPL-MCNC: 7.9 G/DL (ref 6.3–8.6)
PROT UR QL STRIP: NEGATIVE
RBC # BLD AUTO: 4.77 10*6/MM3 (ref 3.77–5.16)
SODIUM BLD-SCNC: 142 MMOL/L (ref 137–145)
SP GR UR STRIP: 1.07 (ref 1–1.03)
UROBILINOGEN UR QL STRIP: ABNORMAL
VALPROATE SERPL-MCNC: <10 MCG/ML (ref 50–120)
WBC NRBC COR # BLD: 12.89 10*3/MM3 (ref 3.2–9.8)
WHOLE BLOOD HOLD SPECIMEN: NORMAL
WHOLE BLOOD HOLD SPECIMEN: NORMAL

## 2017-12-10 PROCEDURE — 85025 COMPLETE CBC W/AUTO DIFF WBC: CPT | Performed by: FAMILY MEDICINE

## 2017-12-10 PROCEDURE — 96375 TX/PRO/DX INJ NEW DRUG ADDON: CPT

## 2017-12-10 PROCEDURE — 97530 THERAPEUTIC ACTIVITIES: CPT | Performed by: PHYSICAL THERAPIST

## 2017-12-10 PROCEDURE — G0378 HOSPITAL OBSERVATION PER HR: HCPCS

## 2017-12-10 PROCEDURE — 74177 CT ABD & PELVIS W/CONTRAST: CPT

## 2017-12-10 PROCEDURE — 0 IOPAMIDOL 61 % SOLUTION: Performed by: INTERNAL MEDICINE

## 2017-12-10 PROCEDURE — G8978 MOBILITY CURRENT STATUS: HCPCS | Performed by: PHYSICAL THERAPIST

## 2017-12-10 PROCEDURE — 25010000002 ONDANSETRON PER 1 MG: Performed by: FAMILY MEDICINE

## 2017-12-10 PROCEDURE — 83690 ASSAY OF LIPASE: CPT | Performed by: FAMILY MEDICINE

## 2017-12-10 PROCEDURE — 25010000002 MORPHINE PER 10 MG: Performed by: FAMILY MEDICINE

## 2017-12-10 PROCEDURE — 96361 HYDRATE IV INFUSION ADD-ON: CPT

## 2017-12-10 PROCEDURE — 25010000002 HEPARIN (PORCINE) PER 1000 UNITS: Performed by: INTERNAL MEDICINE

## 2017-12-10 PROCEDURE — 96374 THER/PROPH/DIAG INJ IV PUSH: CPT

## 2017-12-10 PROCEDURE — 80053 COMPREHEN METABOLIC PANEL: CPT | Performed by: FAMILY MEDICINE

## 2017-12-10 PROCEDURE — G8979 MOBILITY GOAL STATUS: HCPCS | Performed by: PHYSICAL THERAPIST

## 2017-12-10 PROCEDURE — 80164 ASSAY DIPROPYLACETIC ACD TOT: CPT | Performed by: FAMILY MEDICINE

## 2017-12-10 PROCEDURE — 96372 THER/PROPH/DIAG INJ SC/IM: CPT

## 2017-12-10 PROCEDURE — 97162 PT EVAL MOD COMPLEX 30 MIN: CPT | Performed by: PHYSICAL THERAPIST

## 2017-12-10 PROCEDURE — 74020 HC XR ABDOMEN FLAT & UPRIGHT: CPT

## 2017-12-10 PROCEDURE — 99284 EMERGENCY DEPT VISIT MOD MDM: CPT

## 2017-12-10 PROCEDURE — 81003 URINALYSIS AUTO W/O SCOPE: CPT | Performed by: INTERNAL MEDICINE

## 2017-12-10 RX ORDER — DIVALPROEX SODIUM 250 MG/1
500 TABLET, DELAYED RELEASE ORAL DAILY
Status: DISCONTINUED | OUTPATIENT
Start: 2017-12-10 | End: 2017-12-11 | Stop reason: HOSPADM

## 2017-12-10 RX ORDER — BISACODYL 10 MG
10 SUPPOSITORY, RECTAL RECTAL DAILY PRN
Status: DISCONTINUED | OUTPATIENT
Start: 2017-12-10 | End: 2017-12-11 | Stop reason: HOSPADM

## 2017-12-10 RX ORDER — HYDROXYCHLOROQUINE SULFATE 200 MG/1
200 TABLET, FILM COATED ORAL DAILY
Status: DISCONTINUED | OUTPATIENT
Start: 2017-12-10 | End: 2017-12-11 | Stop reason: HOSPADM

## 2017-12-10 RX ORDER — ACETAMINOPHEN 325 MG/1
650 TABLET ORAL EVERY 4 HOURS PRN
Status: DISCONTINUED | OUTPATIENT
Start: 2017-12-10 | End: 2017-12-11 | Stop reason: HOSPADM

## 2017-12-10 RX ORDER — ONDANSETRON 4 MG/1
4 TABLET, ORALLY DISINTEGRATING ORAL EVERY 6 HOURS PRN
Status: DISCONTINUED | OUTPATIENT
Start: 2017-12-10 | End: 2017-12-11 | Stop reason: HOSPADM

## 2017-12-10 RX ORDER — ONDANSETRON 2 MG/ML
4 INJECTION INTRAMUSCULAR; INTRAVENOUS ONCE
Status: COMPLETED | OUTPATIENT
Start: 2017-12-10 | End: 2017-12-10

## 2017-12-10 RX ORDER — ONDANSETRON 4 MG/1
4 TABLET, FILM COATED ORAL EVERY 6 HOURS PRN
Status: DISCONTINUED | OUTPATIENT
Start: 2017-12-10 | End: 2017-12-11 | Stop reason: HOSPADM

## 2017-12-10 RX ORDER — ONDANSETRON 2 MG/ML
4 INJECTION INTRAMUSCULAR; INTRAVENOUS EVERY 6 HOURS PRN
Status: DISCONTINUED | OUTPATIENT
Start: 2017-12-10 | End: 2017-12-11 | Stop reason: HOSPADM

## 2017-12-10 RX ORDER — SUCRALFATE 1 G/1
1 TABLET ORAL 4 TIMES DAILY PRN
Status: DISCONTINUED | OUTPATIENT
Start: 2017-12-10 | End: 2017-12-11 | Stop reason: HOSPADM

## 2017-12-10 RX ORDER — GABAPENTIN 100 MG/1
100 CAPSULE ORAL 3 TIMES DAILY
Status: DISCONTINUED | OUTPATIENT
Start: 2017-12-10 | End: 2017-12-11 | Stop reason: HOSPADM

## 2017-12-10 RX ORDER — HYDRALAZINE HYDROCHLORIDE 20 MG/ML
10 INJECTION INTRAMUSCULAR; INTRAVENOUS EVERY 6 HOURS PRN
Status: DISCONTINUED | OUTPATIENT
Start: 2017-12-10 | End: 2017-12-11 | Stop reason: HOSPADM

## 2017-12-10 RX ORDER — SODIUM CHLORIDE 0.9 % (FLUSH) 0.9 %
10 SYRINGE (ML) INJECTION AS NEEDED
Status: DISCONTINUED | OUTPATIENT
Start: 2017-12-10 | End: 2017-12-11 | Stop reason: HOSPADM

## 2017-12-10 RX ORDER — SODIUM CHLORIDE 9 MG/ML
100 INJECTION, SOLUTION INTRAVENOUS CONTINUOUS
Status: DISCONTINUED | OUTPATIENT
Start: 2017-12-10 | End: 2017-12-11 | Stop reason: HOSPADM

## 2017-12-10 RX ORDER — MORPHINE SULFATE 8 MG/ML
4 INJECTION INTRAMUSCULAR; INTRAVENOUS; SUBCUTANEOUS ONCE
Status: COMPLETED | OUTPATIENT
Start: 2017-12-10 | End: 2017-12-10

## 2017-12-10 RX ORDER — HEPARIN SODIUM 5000 [USP'U]/ML
5000 INJECTION, SOLUTION INTRAVENOUS; SUBCUTANEOUS EVERY 8 HOURS SCHEDULED
Status: DISCONTINUED | OUTPATIENT
Start: 2017-12-10 | End: 2017-12-11 | Stop reason: HOSPADM

## 2017-12-10 RX ORDER — SODIUM CHLORIDE 0.9 % (FLUSH) 0.9 %
1-10 SYRINGE (ML) INJECTION AS NEEDED
Status: DISCONTINUED | OUTPATIENT
Start: 2017-12-10 | End: 2017-12-11 | Stop reason: HOSPADM

## 2017-12-10 RX ORDER — AMLODIPINE BESYLATE 5 MG/1
5 TABLET ORAL DAILY
Status: DISCONTINUED | OUTPATIENT
Start: 2017-12-10 | End: 2017-12-11 | Stop reason: HOSPADM

## 2017-12-10 RX ORDER — PANTOPRAZOLE SODIUM 40 MG/1
40 TABLET, DELAYED RELEASE ORAL EVERY MORNING
Status: DISCONTINUED | OUTPATIENT
Start: 2017-12-10 | End: 2017-12-10 | Stop reason: SDUPTHER

## 2017-12-10 RX ORDER — MECLIZINE HYDROCHLORIDE 25 MG/1
25 TABLET ORAL 3 TIMES DAILY PRN
Status: DISCONTINUED | OUTPATIENT
Start: 2017-12-10 | End: 2017-12-11 | Stop reason: HOSPADM

## 2017-12-10 RX ORDER — FLUTICASONE PROPIONATE 50 MCG
2 SPRAY, SUSPENSION (ML) NASAL DAILY
Status: DISCONTINUED | OUTPATIENT
Start: 2017-12-10 | End: 2017-12-11 | Stop reason: HOSPADM

## 2017-12-10 RX ORDER — PANTOPRAZOLE SODIUM 40 MG/1
40 TABLET, DELAYED RELEASE ORAL
Status: DISCONTINUED | OUTPATIENT
Start: 2017-12-11 | End: 2017-12-11 | Stop reason: HOSPADM

## 2017-12-10 RX ORDER — DOCUSATE SODIUM 100 MG/1
200 CAPSULE, LIQUID FILLED ORAL 2 TIMES DAILY
Status: DISCONTINUED | OUTPATIENT
Start: 2017-12-10 | End: 2017-12-11 | Stop reason: HOSPADM

## 2017-12-10 RX ORDER — FAMOTIDINE 20 MG/1
20 TABLET, FILM COATED ORAL 2 TIMES DAILY
Status: DISCONTINUED | OUTPATIENT
Start: 2017-12-10 | End: 2017-12-11 | Stop reason: HOSPADM

## 2017-12-10 RX ADMIN — HEPARIN SODIUM 5000 UNITS: 5000 INJECTION, SOLUTION INTRAVENOUS; SUBCUTANEOUS at 15:30

## 2017-12-10 RX ADMIN — IOPAMIDOL 80 ML: 612 INJECTION, SOLUTION INTRAVENOUS at 14:15

## 2017-12-10 RX ADMIN — DOCUSATE SODIUM 200 MG: 100 CAPSULE, LIQUID FILLED ORAL at 17:52

## 2017-12-10 RX ADMIN — GABAPENTIN 100 MG: 100 CAPSULE ORAL at 17:52

## 2017-12-10 RX ADMIN — AMLODIPINE BESYLATE 5 MG: 5 TABLET ORAL at 12:48

## 2017-12-10 RX ADMIN — ONDANSETRON 4 MG: 2 INJECTION INTRAMUSCULAR; INTRAVENOUS at 03:59

## 2017-12-10 RX ADMIN — DOCUSATE SODIUM 200 MG: 100 CAPSULE, LIQUID FILLED ORAL at 12:50

## 2017-12-10 RX ADMIN — FLUTICASONE PROPIONATE 2 SPRAY: 50 SPRAY, METERED NASAL at 12:51

## 2017-12-10 RX ADMIN — FAMOTIDINE 20 MG: 20 TABLET, FILM COATED ORAL at 17:52

## 2017-12-10 RX ADMIN — FAMOTIDINE 20 MG: 20 TABLET, FILM COATED ORAL at 12:48

## 2017-12-10 RX ADMIN — DIVALPROEX SODIUM 500 MG: 250 TABLET, DELAYED RELEASE ORAL at 12:48

## 2017-12-10 RX ADMIN — SODIUM CHLORIDE 1000 ML: 9 INJECTION, SOLUTION INTRAVENOUS at 03:58

## 2017-12-10 RX ADMIN — HEPARIN SODIUM 5000 UNITS: 5000 INJECTION, SOLUTION INTRAVENOUS; SUBCUTANEOUS at 21:54

## 2017-12-10 RX ADMIN — HYDROXYCHLOROQUINE SULFATE 200 MG: 200 TABLET, FILM COATED ORAL at 12:48

## 2017-12-10 RX ADMIN — MORPHINE SULFATE 4 MG: 8 INJECTION, SOLUTION INTRAMUSCULAR; INTRAVENOUS at 03:58

## 2017-12-10 RX ADMIN — GABAPENTIN 100 MG: 100 CAPSULE ORAL at 12:49

## 2017-12-10 RX ADMIN — GABAPENTIN 100 MG: 100 CAPSULE ORAL at 21:53

## 2017-12-10 RX ADMIN — SODIUM CHLORIDE 100 ML/HR: 9 INJECTION, SOLUTION INTRAVENOUS at 12:51

## 2017-12-10 NOTE — THERAPY EVALUATION
Acute Care - Physical Therapy Initial Evaluation  [facility]     Patient Name: Mary Anne Wolf  : 1945  MRN: [id number]  Today's Date: 12/10/2017   Onset of Illness/Injury or Date of Surgery Date: 12/10/17  Date of Referral to PT: 12/10/17  Referring Physician: Dr SONA Bejarano      Admit Date: 12/10/2017     Visit Dx:    ICD-10-CM ICD-9-CM   1. Intractable vomiting with nausea, unspecified vomiting type R11.2 536.2   2. Impaired physical mobility Z74.09 781.99     Patient Active Problem List   Diagnosis   • Essential hypertension   • Neuropathy   • Seizure disorder   • Vertigo   • Systemic lupus erythematosus   • Borderline glaucoma   • Nuclear cataract   • Long-term use of hydroxychloroquine   • Osteoporosis, unspecified   • Intractable vomiting with nausea     Past Medical History:   Diagnosis Date   • Acute posthemorrhagic anemia    • Backache     LLE radiculopathy   • Cigarette smoker    • Discoid lupus erythematosus    • Diverticular disease of colon     incomplete colonoscopy to Hepatic flexure.  Rec ACBE, pt. considering   • Dizziness and giddiness    • Dysphagia    • Esophagitis     grade III   • Essential (primary) hypertension    • Essential hypertension    • Essential hypertension    • Gastroesophageal reflux disease     rec pt try RX prilosec   • H/O screening mammography    • Localization-related (focal) (partial) symptomatic epilepsy and epileptic syndromes with complex partial seizures, intractable, without status epilepticus    • Lupus    • Neuropathy    • Neuropathy    • Nicotine dependence    • Osteopenia    • Periumbilical pain    • Seizure disorder    • Seizure disorder     history of complex partial seizure   • Stricture of esophagus    • Systemic lupus erythematosus    • Systemic lupus erythematosus    • Vertigo    • Vertigo      Past Surgical History:   Procedure Laterality Date   • COLONOSCOPY  2015   • COLONOSCOPY  2014    REFUSED BY PATIENT   • COLONOSCOPY  2015     a diverticulum was found in the sigmoid colon   • ENDOSCOPY  08/05/2015    Esophageal stricture was present. Dilatatin was performed. Esophagitis . Biopsy taken. Normal stomach. Normal duodenum   • INJECTION OF MEDICATION      [place] (2)          PT ASSESSMENT (last 72 hours)      PT Evaluation       12/10/17 1415 12/10/17 1000    Rehab Evaluation    Document Type evaluation  -CB     Subjective Information agree to therapy;complains of;pain  -CB     Patient Effort, Rehab Treatment adequate  -CB     Symptoms Noted During/After Treatment increased pain  -CB     Symptoms Noted Comment been having  pain across low back for few days without any know cause  -CB     General Information    Patient Profile Review yes  -CB     Onset of Illness/Injury or Date of Surgery Date 12/10/17  -CB     Referring Physician Dr SONA Bejarano  -CB     General Observations alying in bed with  present, IV , telemetry  -CB     Pertinent History Of Current Problem abdominal pain nausea and vomititn 2-3 days  -CB     Precautions/Limitations fall precautions   log roll- history of compression frx L1  -CB     Prior Level of Function independent:;gait;ADL's  -CB     Equipment Currently Used at Home none   has rolling walker and s/p cane at home if needed  -CB cane, straight  -ME    Plans/Goals Discussed With patient  -CB     Risks Reviewed patient:;nausea/vomiting;increased discomfort  -CB     Benefits Reviewed patient:;improve function;decrease pain;decrease risk of DVT  -CB     Living Environment    Lives With spouse  -CB spouse  -ME    Living Arrangements house  -CB house  -ME    Home Accessibility ramps present at home  -CB no concerns  -ME    Stair Railings at Home outside, present at both sides  -CB outside, present at both sides  -ME    Type of Financial/Environmental Concern  none  -ME    Transportation Available family or friend will provide  -CB car  -ME    Clinical Impression    Date of Referral to PT 12/10/17  -CB     PT Diagnosis  impaired physical mobility due to nausea, vomiiting and abdominal pain  -CB     Criteria for Skilled Therapeutic Interventions Met treatment indicated;yes  -CB     Pathology/Pathophysiology Noted (Describe Specifically for Each System) musculoskeletal  -CB     Impairments Found (describe specific impairments) aerobic capacity/endurance;gait, locomotion, and balance  -CB     Functional Limitations in Following Categories (Describe Specific Limitations) self-care;home management  -CB     Rehab Potential good, to achieve stated therapy goals  -CB     Predicted Duration of Therapy Intervention (days/wks) until goals met or discharged  -CB     Vital Signs    Pre Systolic BP Rehab 136  -CB     Pre Treatment Diastolic BP 63  -CB     Post Systolic BP Rehab 141  -CB     Post Treatment Diastolic BP 67  -CB     Pretreatment Heart Rate (beats/min) 69  -CB     Posttreatment Heart Rate (beats/min) 71  -CB     Pre SpO2 (%) 91  -CB     O2 Delivery Pre Treatment room air  -CB     Post SpO2 (%) 93  -CB     O2 Delivery Post Treatment room air  -CB     Pre Patient Position Supine  -CB     Intra Patient Position Standing  -CB     Post Patient Position Supine  -CB     Pain Assessment    Pain Assessment 0-10  -CB     Pain Score 9  -CB     Post Pain Score 8  -CB     Pain Location Hip  -CB     Pain Orientation Right  -CB     Pain Intervention(s) Ambulation/increased activity;Repositioned  -CB     Vision Assessment/Intervention    Visual Impairment WFL with corrective lenses  -CB     Cognitive Assessment/Intervention    Current Cognitive/Communication Assessment functional  -CB     Orientation Status oriented x 4  -CB     Follows Commands/Answers Questions 100% of the time  -CB     Personal Safety WNL/WFL  -CB     Personal Safety Interventions fall prevention program maintained;nonskid shoes/slippers when out of bed  -CB     ROM (Range of Motion)    General ROM no range of motion deficits identified  -CB     MMT (Manual Muscle Testing)     General MMT Assessment lower extremity strength deficits identified  -CB     General MMT Assessment Detail grossly 3+/5 BLE hip and knees, 4/5 ankle  -CB     Mobility Assessment/Training    Extremity Weight-Bearing Status left lower extremity;right lower extremity  -CB     Bed Mobility, Assessment/Treatment    Bed Mob, Supine to Sit, [place] moderate assist (50% patient effort)  -CB     Bed Mob, Sit to Supine, [place] minimum assist (75% patient effort)  -CB     Bed Mobility, Impairments pain;strength decreased  -CB     Transfer Assessment/Treatment    Transfers, Sit-Stand [place] minimum assist (75% patient effort)  -CB     Transfers, Stand-Sit [place] minimum assist (75% patient effort)  -CB     Transfers, Sit-Stand-Sit, Assist Device --   handheld assist of therapist  -CB     Transfer, Impairments pain;strength decreased  -CB     Gait Assessment/Treatment    Gait, [place] Level hand held assist  -CB     Gait, Assistive Device --   none  -CB     Gait, Distance (Feet) 8  -CB     Gait, Impairments strength decreased;pain  -CB     Sensory Assessment/Intervention    Light Touch LLE;RLE  -CB     LLE Light Touch WNL  -CB     RLE Light Touch WNL  -CB     Positioning and Restraints    Pre-Treatment Position in bed  -CB     Post Treatment Position bed  -CB     In Bed supine;call light within reach;encouraged to call for assist;exit alarm on;with family/caregiver;side rails up x2  -CB       User Key  (r) = Recorded By, (t) = Taken By, (c) = Cosigned By    Initials Name Provider Type    CB Emma Tolliver, PT Physical Therapist    ZOIE Sapp RN Registered Nurse          Physical Therapy Education     Title: PT OT SLP Therapies (Active)     Topic: Physical Therapy (Active)     Point: Mobility training (Active)    Learning Progress Summary    Learner Readiness Method Response Comment Documented by Status   Patient Acceptance D NR  CB 12/10/17 6019 Active   Family Acceptance D NR  CB  12/10/17 1509 Active               Point: Precautions (Active)    Learning Progress Summary    Learner Readiness Method Response Comment Documented by Status   Patient Acceptance D NR  CB 12/10/17 1509 Active   Family Acceptance D NR  CB 12/10/17 1509 Active                      User Key     Initials Effective Dates Name Provider Type Discipline     04/06/17 -  Emma Tolliver, PT Physical Therapist PT                PT Recommendation and Plan  Anticipated Equipment Needs At Discharge:  (none)  Anticipated Discharge Disposition: home with home health, home with outpatient services  Planned Therapy Interventions: bed mobility training, gait training, home exercise program, transfer training, strengthening, patient/family education  PT Frequency: other (see comments) (5-14')  Plan of Care Review  Plan Of Care Reviewed With: family  Outcome Summary/Follow up Plan: PT sylvie completed, was able to come to sit EOB with mod assist of one, complained of great deal of pain across entire low back area, once sitting able to get balance and then come to stand CGA of ofne and hand held assist of therapsit, able to take 4-5 steps to HOB handheld assist tof therapist, stand to sit CGA sit to sodelying to supine with min assist of fone, could benefit from skilled PT to regain and return to highest elve  of function in log rolling/bed mobiity, trnasfers,gait and strengthening          IP PT Goals       12/10/17 1415          Bed Mobility PT STG    Bed Mobility PT STG, Date Established 12/10/17  -CB      Bed Mobility PT STG, Time to Achieve 2 days  -CB      Bed Mobility PT STG, [place] Level independent  -CB      Bed Mobility PT STG, Additional Goal HOB down and no rails  -CB      Transfer Training PT STG    Transfer Training PT STG, Date Established 12/10/17  -CB      Transfer Training PT STG, Time to Achieve 2 - 3 days  -CB      Transfer Training PT STG, Activity Type bed to chair /chair to bed;sit to stand/stand to sit  -CB       Transfer Training PT STG, [place] Level independent;conditional independence  -CB      Transfer Training PT STG, Assist Device cane, straight;walker, rolling  -CB      Gait Training PT LTG    Gait Training Goal PT LTG, Date Established 12/10/17  -CB      Gait Training Goal PT LTG, Time to Achieve by discharge  -CB      Gait Training Goal PT LTG, [place] Level conditional independence;independent  -CB      Gait Training Goal PT LTG, Assist Device walker, rolling;cane, straight  -CB      Gait Training Goal PT LTG, Distance to Achieve 200 feet  -CB      Strength Goal PT LTG    Strength Goal PT LTG, Date Established 12/10/17  -CB      Strength Goal PT LTG, Time to Achieve by discharge  -CB      Strength Goal PT LTG, Measure to Achieve 20 reps all ex BLE sitting /standing actively  -CB      Strength Goal PT LTG, Functional Goal get legs up onto bed without assistance and following log roll precautions  -CB      Physical Therapy PT LTG    Physical Therapy PT LTG, Date Established 12/10/17  -CB      Physical Therapy PT LTG, Time to Achieve by discharge  -CB      Physical Therapy PT LTG, Activity Type be able to log roll without using bed rails and HOB down ,  may assist  -CB      Physical Therapy PT LTG, [place] Level conditional independence  -CB        User Key  (r) = Recorded By, (t) = Taken By, (c) = Cosigned By    Initials Name Provider Type    CB Emma Tolliver, PT Physical Therapist                Outcome Measures       12/10/17 1415          How much help from another person do you currently need...    Turning from your back to your side while in flat bed without using bedrails? 2  -CB      Moving from lying on back to sitting on the side of a flat bed without bedrails? 2  -CB      Moving to and from a bed to a chair (including a wheelchair)? 3  -CB      Standing up from a chair using your arms (e.g., wheelchair, bedside chair)? 3  -CB      Climbing 3-5 steps with a railing? 2  -CB       To walk in hospital room? 3  -CB      AM-PAC 6 Clicks Score 15  -CB      Functional Assessment    Outcome Measure Options AM-PAC 6 Clicks Basic Mobility (PT)  -CB        User Key  (r) = Recorded By, (t) = Taken By, (c) = Cosigned By    Initials Name Provider Type    CB Emma Tolliver, PT Physical Therapist           Time Calculation:         PT Charges       12/10/17 1521          Time Calculation    Start Time 1415  -CB      Stop Time 1441  -CB      Time Calculation (min) 26 min  -CB      PT Received On 12/10/17  -CB      PT Goal Re-Cert Due Date 12/23/17  -CB      Time Calculation- PT    Total Timed Code Minutes- PT 11 minute(s)  -CB        User Key  (r) = Recorded By, (t) = Taken By, (c) = Cosigned By    Initials Name Provider Type    MAKAYLA Tolliver, PT Physical Therapist          Therapy Charges for Today     Code Description Service Date Service Provider Modifiers Qty    17394785803 HC PT MOBILITY CURRENT 12/10/2017 Emma Tolliver, PT GP, CK 1    88406037817 HC PT MOBILITY PROJECTED 12/10/2017 Emma Tolliver, PT GP, CJ 1    12198799441 HC PT EVAL MOD COMPLEXITY 1 12/10/2017 Emma Tolliver, PT GP 1    64975677559 HC PT THERAPEUTIC ACT EA 15 MIN 12/10/2017 Emma Tolliver, PT GP 1          PT G-Codes  PT Professional Judgement Used?: Yes  Outcome Measure Options: AM-PAC 6 Clicks Basic Mobility (PT)  Score: 15  Functional Limitation: Mobility: Walking and moving around  Mobility: Walking and Moving Around Current Status (): At least 40 percent but less than 60 percent impaired, limited or restricted  Mobility: Walking and Moving Around Goal Status (): At least 20 percent but less than 40 percent impaired, limited or restricted      Emma Tolliver, PT  12/10/2017

## 2017-12-10 NOTE — NURSING NOTE
12/10/17 1620 12/10/17 1624 12/10/17 1626   Oxygen Therapy   SpO2 93 %  (sitting) (!) 87 %  (walking) 91 %  (walking)   Pulse Oximetry Type Intermittent Intermittent Intermittent   O2 Device room air room air nasal cannula   Flow (L/min) --  --  2       12/10/17 1638   Oxygen Therapy   SpO2 93 %   Pulse Oximetry Type Intermittent   O2 Device nasal cannula   Flow (L/min) 2  (at rest)

## 2017-12-10 NOTE — PLAN OF CARE
Problem: Patient Care Overview (Adult)  Goal: Plan of Care Review  Outcome: Ongoing (interventions implemented as appropriate)    12/10/17 1415   Coping/Psychosocial Response Interventions   Plan Of Care Reviewed With family   Outcome Evaluation   Outcome Summary/Follow up Plan PT sylvie completed, was able to come to sit EOB with mod assist of one, complained of great deal of pain across entire low back area, once sitting able to get balance and then come to stand CGA of ofne and hand held assist of therapsit, able to take 4-5 steps to HOB handheld assist tof therapist, stand to sit CGA sit to sodelying to supine with min assist of fone, could benefit from skilled PT to regain and return to highest elve of function in log rolling/bed mobiity, trnasfers,gait and strengthening       Goal: Discharge Needs Assessment  Outcome: Ongoing (interventions implemented as appropriate)    12/10/17 1415   Current Health   Outpatient/Agency/Support Group Needs homecare agency (specify level of care)   Discharge Needs Assessment   Equipment Needed After Discharge (may need to use walker she already has)   Current Discharge Risk lives alone;chronically ill   Discharge Disposition still a patient   Living Environment   Transportation Available family or friend will provide   Self-Care   Equipment Currently Used at Home none  (has rolling walker and s/p cane at home if needed)         Problem: Inpatient Physical Therapy  Goal: Bed Mobility Goal STG- PT  Outcome: Ongoing (interventions implemented as appropriate)    12/10/17 1415   Bed Mobility PT STG   Bed Mobility PT STG, Date Established 12/10/17   Bed Mobility PT STG, Time to Achieve 2 days   Bed Mobility PT STG, [place] Level independent   Bed Mobility PT STG, Additional Goal HOB down and no rails       Goal: Transfer Training Goal 1 STG- PT  Outcome: Ongoing (interventions implemented as appropriate)    12/10/17 1415   Transfer Training PT STG   Transfer Training PT STG, Date  Established 12/10/17   Transfer Training PT STG, Time to Achieve 2 - 3 days   Transfer Training PT STG, Activity Type bed to chair /chair to bed;sit to stand/stand to sit   Transfer Training PT STG, [place] Level independent;conditional independence   Transfer Training PT STG, Assist Device cane, straight;walker, rolling       Goal: Gait Training Goal LTG- PT  Outcome: Ongoing (interventions implemented as appropriate)    12/10/17 1415   Gait Training PT LTG   Gait Training Goal PT LTG, Date Established 12/10/17   Gait Training Goal PT LTG, Time to Achieve by discharge   Gait Training Goal PT LTG, [place] Level conditional independence;independent   Gait Training Goal PT LTG, Assist Device walker, rolling;cane, straight   Gait Training Goal PT LTG, Distance to Achieve 200 feet       Goal: Strength Goal LTG- PT  Outcome: Ongoing (interventions implemented as appropriate)    12/10/17 1415   Strength Goal PT LTG   Strength Goal PT LTG, Date Established 12/10/17   Strength Goal PT LTG, Time to Achieve by discharge   Strength Goal PT LTG, Measure to Achieve 20 reps all ex BLE sitting /standing actively   Strength Goal PT LTG, Functional Goal get legs up onto bed without assistance and following log roll precautions       Goal: Physical Therapy Goal LTG- PT  Outcome: Ongoing (interventions implemented as appropriate)    12/10/17 1415   Physical Therapy PT LTG   Physical Therapy PT LTG, Date Established 12/10/17   Physical Therapy PT LTG, Time to Achieve by discharge   Physical Therapy PT LTG, Activity Type be able to log roll without using bed rails and HOB down ,  may assist   Physical Therapy PT LTG, [place] Level conditional independence

## 2017-12-10 NOTE — PROGRESS NOTES
[facility] Medicine Services  INPATIENT PROGRESS NOTE    Length of Stay: 0  Date of Admission: 12/10/2017  Primary Care Physician: Bess Suárez DO    Subjective   Chief Complaint: Abdominal and back pain.     HPI:  This is a 72-year-old lady that was admitted to [facility] this a.m. due to abdominal pain, nausea, and vomiting.  The patient lives at home with her spouse, which he says the patient usually is able to take care of herself but hasn't been able to for the past 2-3 days due to pain nausea and vomiting.  The patient is noted to have a white blood cell count of 12.89.  The patient was seen in the emergency room on 12/9/2017 due to right gluteal and leg pain.  She was prescribed Lortab.  She has a history of an acute compression fracture at L1.      Review of Systems   Constitutional: Positive for activity change and fatigue.   Gastrointestinal: Positive for abdominal pain, nausea and vomiting.   Musculoskeletal: Positive for back pain.      All pertinent negatives and positives are as above. All other systems have been reviewed and are negative unless otherwise stated.     Objective    Temp:  [98 °F (36.7 °C)-99 °F (37.2 °C)] 99 °F (37.2 °C)  Heart Rate:  [80-92] 82  Resp:  [16-18] 18  BP: (122-146)/(62-71) 123/63    Physical Exam   Constitutional: She is oriented to person, place, and time. She appears well-developed and well-nourished.   HENT:   Head: Normocephalic and atraumatic.   Eyes: EOM are normal. Pupils are equal, round, and reactive to light.   Neck: Normal range of motion. Neck supple.   Cardiovascular: Normal rate and regular rhythm.    Pulmonary/Chest: Effort normal and breath sounds normal.   Abdominal: Soft. Bowel sounds are normal.   Musculoskeletal: Normal range of motion.   Neurological: She is alert and oriented to person, place, and time.   Skin: Skin is warm and dry.   Psychiatric: She has a normal mood and affect.      Results Review:  I  have reviewed the labs, radiology results, and diagnostic studies.    Laboratory Data:     Results from last 7 days  Lab Units 12/10/17  0400   SODIUM mmol/L 142   POTASSIUM mmol/L 3.7   CHLORIDE mmol/L 105   CO2 mmol/L 23.0   BUN mg/dL 14   CREATININE mg/dL 0.87   GLUCOSE mg/dL 83   CALCIUM mg/dL 8.5   BILIRUBIN mg/dL 0.8   ALK PHOS U/L 73   ALT (SGPT) U/L 30   AST (SGOT) U/L 17   ANION GAP mmol/L 14.0     Estimated Creatinine Clearance: 43.3 mL/min (by C-G formula based on Cr of 0.87).            Results from last 7 days  Lab Units 12/10/17  0400   WBC 10*3/mm3 12.89*   HEMOGLOBIN g/dL 13.0   HEMATOCRIT % 39.5   PLATELETS 10*3/mm3 186           Culture Data:   No results found for: BLOODCX  No results found for: URINECX  No results found for: RESPCX  No results found for: WOUNDCX  No results found for: STOOLCX  No components found for: BODYFLD    Radiology Data:   Imaging Results (last 24 hours)     Procedure Component Value Units Date/Time    XR Abdomen Flat & Upright [[id number]] Collected:  12/10/17 0845     Updated:  12/10/17 0909    Narrative:       Procedure: Supine and upright abdomen    Reason for exam: Abdominal pain. Nausea and vomiting.    FINDINGS: Comparison study dated April 26, 2015. Imaging through  lung bases reveals no abnormality. Old L1 vertebral body  compression fractures with loss of vertebral body height by  15-20%. This compression fracture was first identified on CT  lumbar spine exam of July 7, 2017. No acute osseous abnormality.  Soft tissue structures are normal. No pathologic calcifications.  Nonobstructive bowel gas pattern. No free intraperitoneal air.      Impression:       1.  Old L1 vertebral body compression fracture with loss of  vertebral body height by 15-20%.  2.  No acute abdominal abnormality.    Electronically signed by:  Adalid Zamorano MD  12/10/2017 9:08 AM [facility]  Workstation: [id number]          I have reviewed the patient current medications.     Assessment/Plan     Hospital  Problem List     * (Principal)Intractable vomiting with nausea    Essential hypertension    Seizure disorder    Systemic lupus erythematosus          Plan:    Nausea, vomiting, back and abdominal pain:  IVF, antiemetics.  CT abdomen and pelvis ordered.         Discharge Planning: I expect patient to be discharged to home in 1-2 days.      This document has been electronically signed by MARINE Hamilton on December 10, 2017 11:58 AM

## 2017-12-10 NOTE — H&P
[facility] Medicine Services  INPATIENT HISTORY AND PHYSICAL       Patient Care Team:  Bess Suárez DO as PCP - General (Family Medicine)    Chief complaint   Chief Complaint   Patient presents with   • Vomiting       Subjective     Patient is a 72 y.o. female presents with Complaint of nausea vomiting started yesterday evening.  Patient states she's been having clear partially digested food as part of vomitus.  Patient denies any fever or chills associated with it.  Patient's last episode was around 3:00 this morning.  Patient has not had any such episodes since then however patient was concerned hence she decided to come to emergency room for further evaluation.  No diarrhea have been reported patient is complaining of mild abdominal pain associated with it.  No weight loss we can have been reported.  No chest or palpitation reported no shortness of air have been reported.  As mentioned patient is having diarrhea.    Review of Systems   Review of Systems   Constitutional: Negative for appetite change, chills, diaphoresis and fever.   HENT: Negative for congestion, rhinorrhea, sore throat and trouble swallowing.    Eyes: Negative for visual disturbance.   Respiratory: Negative for cough, chest tightness, shortness of breath and wheezing.    Cardiovascular: Negative for chest pain, palpitations and leg swelling.   Gastrointestinal: Positive for nausea and vomiting. Negative for abdominal pain, blood in stool and diarrhea.   Endocrine: Negative for cold intolerance and heat intolerance.   Genitourinary: Negative for decreased urine volume and difficulty urinating.   Musculoskeletal: Negative for back pain, gait problem and neck pain.   Skin: Negative for rash.   Neurological: Negative for dizziness, syncope, weakness, light-headedness, numbness and headaches.   Psychiatric/Behavioral: The patient is not nervous/anxious.        History  Past Medical History:   Diagnosis  Date   • Acute posthemorrhagic anemia    • Backache     LLE radiculopathy   • Cigarette smoker    • Discoid lupus erythematosus    • Diverticular disease of colon     incomplete colonoscopy to Hepatic flexure.  Rec ACBE, pt. considering   • Dizziness and giddiness    • Dysphagia    • Esophagitis     grade III   • Essential (primary) hypertension    • Essential hypertension    • Essential hypertension    • Gastroesophageal reflux disease     rec pt try RX prilosec   • H/O screening mammography    • Localization-related (focal) (partial) symptomatic epilepsy and epileptic syndromes with complex partial seizures, intractable, without status epilepticus    • Lupus    • Neuropathy    • Neuropathy    • Nicotine dependence    • Osteopenia    • Periumbilical pain    • Seizure disorder    • Seizure disorder     history of complex partial seizure   • Stricture of esophagus    • Systemic lupus erythematosus    • Systemic lupus erythematosus    • Vertigo    • Vertigo      Past Surgical History:   Procedure Laterality Date   • COLONOSCOPY  08/05/2015   • COLONOSCOPY  07/08/2014    REFUSED BY PATIENT   • COLONOSCOPY  08/05/2015    a diverticulum was found in the sigmoid colon   • ENDOSCOPY  08/05/2015    Esophageal stricture was present. Dilatatin was performed. Esophagitis . Biopsy taken. Normal stomach. Normal duodenum   • INJECTION OF MEDICATION      Rocehin (2)     Family History   Problem Relation Age of Onset   • Lupus Mother    • Rheum arthritis Mother    • Lupus Sister      Social History   Substance Use Topics   • Smoking status: Current Every Day Smoker     Packs/day: 0.50     Types: Cigarettes   • Smokeless tobacco: Never Used      Comment: 2 a day   • Alcohol use No       (Not in a hospital admission)  Allergies:  Milk-related compounds  Prior to Admission medications    Medication Sig Start Date End Date Taking? Authorizing Provider   amLODIPine (NORVASC) 5 MG tablet Take 1 tablet by mouth Daily. 11/17/17   Bess LOMELI  Jose Armando,    cetirizine (zyrTEC) 10 MG tablet Take 1 tablet by mouth Daily. 11/17/17   Bess Suárez DO   Cyanocobalamin (VITAMIN B-12 ER) 1500 MCG tablet controlled-release Take 1 tablet by mouth Daily.    Historical Provider, MD   denosumab (PROLIA) 60 MG/ML solution syringe Inject 60 mg under the skin 1 (One) Time.    Historical Provider, MD   dexlansoprazole (DEXILANT) 30 MG capsule Take 1 capsule by mouth Daily. 11/17/17   Bess Suárez DO   divalproex (DEPAKOTE) 500 MG DR tablet Take 1 tablet by mouth Daily. 11/17/17   Bess Suárez, DO   fluticasone (FLONASE) 50 MCG/ACT nasal spray 2 sprays into each nostril Daily. 11/17/17   Bess Suárez DO   gabapentin (NEURONTIN) 300 MG capsule Take 1 capsule by mouth 3 (Three) Times a Day. 11/17/17   Bess Suárez DO   HYDROcodone-acetaminophen (NORCO) 5-325 MG per tablet Take 1 tablet by mouth Every 6 (Six) Hours As Needed for Moderate Pain  for up to 3 days. 12/9/17 12/12/17  Misbah Browne MD   hydroxychloroquine (PLAQUENIL) 200 MG tablet Take 1 tablet by mouth Daily. 2/9/17   Bess Suárez DO   meclizine (ANTIVERT) 25 MG tablet Take 1 tablet by mouth 3 (Three) Times a Day As Needed for dizziness. 11/17/17   Bess Suárez DO   sucralfate (CARAFATE) 1 g tablet Take 1 tablet by mouth 4 (Four) Times a Day As Needed (stomach pain, reflux). 11/20/17   Bess Suárez DO       Objective     Vital Signs    Temp:  [98 °F (36.7 °C)] 98 °F (36.7 °C)  Heart Rate:  [80-92] 84  Resp:  [16] 16  BP: (124-146)/(62-71) 124/62    Physical Exam:      Physical Exam   Constitutional: She is oriented to person, place, and time. She appears well-developed and well-nourished.   HENT:   Head: Normocephalic and atraumatic.   Nose: Nose normal.   Eyes: Conjunctivae and EOM are normal. Pupils are equal, round, and reactive to light.   Neck: Normal range of motion. Neck supple. No JVD present. No tracheal deviation present. No thyromegaly present.   Cardiovascular: Normal  rate, regular rhythm, normal heart sounds and intact distal pulses.    Pulmonary/Chest: Effort normal and breath sounds normal. No respiratory distress. She has no wheezes. She has no rales. She exhibits no tenderness.   Abdominal: Soft. Bowel sounds are normal. She exhibits no distension. There is tenderness. There is no rebound and no guarding.   Musculoskeletal: Normal range of motion. She exhibits no edema.   Lymphadenopathy:     She has no cervical adenopathy.   Neurological: She is alert and oriented to person, place, and time. She has normal reflexes. No cranial nerve deficit.   Skin: Skin is warm and dry.   Intact   Psychiatric: She has a normal mood and affect.   Nursing note and vitals reviewed.      Results Review:       Results from last 7 days  Lab Units 12/10/17  0400   SODIUM mmol/L 142   POTASSIUM mmol/L 3.7   CHLORIDE mmol/L 105   CO2 mmol/L 23.0   BUN mg/dL 14   CREATININE mg/dL 0.87   GLUCOSE mg/dL 83   CALCIUM mg/dL 8.5   BILIRUBIN mg/dL 0.8   ALK PHOS U/L 73   ALT (SGPT) U/L 30   AST (SGOT) U/L 17               Results from last 7 days  Lab Units 12/10/17  0400   WBC 10*3/mm3 12.89*   HEMOGLOBIN g/dL 13.0   HEMATOCRIT % 39.5   PLATELETS 10*3/mm3 186           Imaging Results (last 7 days)     ** No results found for the last 168 hours. **          Assessment/Plan     Principal Problem:    Intractable vomiting with nausea  Active Problems:    Essential hypertension    Seizure disorder    Systemic lupus erythematosus      -Suspect viral gastroenteritis.  -We'll continue with IV hydration.  -Resume patient's home medication as prior to coming hospital.  -We'll hold off on hydrocodone since she was recently initiated on it and it may be the contributing factor for nausea and sickness.  -DVT and GI prophylaxis place.    -Continue monitoring patient hospital setting and treat patient as course dictates.    I discussed the patients findings and my recommendations with patient and nursing staff.          This document has been electronically signed by Edis Bejarano MD on December 10, 2017 8:33 AM        Total Time Spent: 45 mins    EMR Dragon/Transcription disclaimer:   Dictated utilizing Dragon dictation.

## 2017-12-10 NOTE — ED TRIAGE NOTES
Pt was seen here 10/9/17 at approx noon for c/o chronic back pain. Family states she is still in pain and started vomiting and became incontinent.

## 2017-12-10 NOTE — ED PROVIDER NOTES
Subjective   Patient is a 72 y.o. female presenting with vomiting.   History provided by:  Patient   used: No    Vomiting   The primary symptoms include abdominal pain, nausea and vomiting. The illness began 3 to 5 days ago. The onset was gradual.   The vomiting began yesterday. Vomiting occurs 6 to 10 times per day. The emesis contains stomach contents.   The illness does not include anorexia.   patient was here early yastarday because of vomiting. Spouse said that she is getting worse.    Review of Systems   Gastrointestinal: Positive for abdominal pain, nausea and vomiting. Negative for anorexia.   All other systems reviewed and are negative.      Past Medical History:   Diagnosis Date   • Acute posthemorrhagic anemia    • Backache     LLE radiculopathy   • Cigarette smoker    • Discoid lupus erythematosus    • Diverticular disease of colon     incomplete colonoscopy to Hepatic flexure.  Rec ACBE, pt. considering   • Dizziness and giddiness    • Dysphagia    • Esophagitis     grade III   • Essential (primary) hypertension    • Essential hypertension    • Essential hypertension    • Gastroesophageal reflux disease     rec pt try RX prilosec   • H/O screening mammography    • Localization-related (focal) (partial) symptomatic epilepsy and epileptic syndromes with complex partial seizures, intractable, without status epilepticus    • Lupus    • Neuropathy    • Neuropathy    • Nicotine dependence    • Osteopenia    • Periumbilical pain    • Seizure disorder    • Seizure disorder     history of complex partial seizure   • Stricture of esophagus    • Systemic lupus erythematosus    • Systemic lupus erythematosus    • Vertigo    • Vertigo        Allergies   Allergen Reactions   • Milk-Related Compounds        Past Surgical History:   Procedure Laterality Date   • COLONOSCOPY  08/05/2015   • COLONOSCOPY  07/08/2014    REFUSED BY PATIENT   • COLONOSCOPY  08/05/2015    a diverticulum was found in the  "sigmoid colon   • ENDOSCOPY  08/05/2015    Esophageal stricture was present. Dilatatin was performed. Esophagitis . Biopsy taken. Normal stomach. Normal duodenum   • INJECTION OF MEDICATION      Rocehin (2)       Family History   Problem Relation Age of Onset   • Lupus Mother    • Rheum arthritis Mother    • Lupus Sister        Social History     Social History   • Marital status:      Spouse name: N/A   • Number of children: N/A   • Years of education: N/A     Social History Main Topics   • Smoking status: Current Every Day Smoker     Packs/day: 0.50     Types: Cigarettes   • Smokeless tobacco: Never Used      Comment: 2 a day   • Alcohol use No   • Drug use: No   • Sexual activity: Defer     Other Topics Concern   • None     Social History Narrative       /64  Pulse 84  Temp 98 °F (36.7 °C)  Resp 16  Ht 170 cm (66.93\")  Wt 47.6 kg (105 lb)  LMP  (LMP Unknown)  SpO2 95%  BMI 16.48 kg/m2    Objective   Physical Exam   Constitutional: She is oriented to person, place, and time. She appears well-developed and well-nourished.   HENT:   Head: Normocephalic and atraumatic.   Right Ear: External ear normal.   Left Ear: External ear normal.   Nose: Nose normal.   Mouth/Throat: Oropharynx is clear and moist.   Neck: Normal range of motion. Neck supple.   Cardiovascular: Normal rate, regular rhythm, normal heart sounds and intact distal pulses.    Pulmonary/Chest: Effort normal and breath sounds normal.   Abdominal: Soft. Bowel sounds are normal.   Neurological: She is alert and oriented to person, place, and time. She has normal reflexes.   Skin: Skin is warm.   Psychiatric: She has a normal mood and affect. Her behavior is normal. Judgment and thought content normal.       Procedures         ED Course  ED Course        Labs Reviewed   VALPROIC ACID LEVEL, TOTAL - Abnormal; Notable for the following:        Result Value    Valproic Acid <10.0 (*)     All other components within normal limits "   COMPREHENSIVE METABOLIC PANEL - Abnormal; Notable for the following:     Globulin 3.9 (*)     A/G Ratio 1.0 (*)     All other components within normal limits    Narrative:     The MDRD GFR formula is only valid for adults with stable renal function between ages 18 and 70.   CBC WITH AUTO DIFFERENTIAL - Abnormal; Notable for the following:     WBC 12.89 (*)     RDW 15.6 (*)     RDW-SD 47.1 (*)     Neutrophil % 89.3 (*)     Lymphocyte % 3.0 (*)     Neutrophils, Absolute 11.51 (*)     Lymphocytes, Absolute 0.39 (*)     Monocytes, Absolute 0.94 (*)     Immature Grans, Absolute 0.04 (*)     All other components within normal limits   LIPASE - Normal   RAINBOW DRAW    Narrative:     The following orders were created for panel order [place] Draw.  Procedure                               Abnormality         Status                     ---------                               -----------         ------                     Light Blue Top[[id number]]                                   In process                 Green Top (Gel)[[id number]]                                  In process                 Lavender Top[[id number]]                                     In process                 Gold Top - SST[[id number]]                                   In process                   Please view results for these tests on the individual orders.   URINALYSIS W/ CULTURE IF INDICATED   CBC AND DIFFERENTIAL    Narrative:     The following orders were created for panel order CBC & Differential.  Procedure                               Abnormality         Status                     ---------                               -----------         ------                     CBC Auto Differential[[id number]]        Abnormal            Final result                 Please view results for these tests on the individual orders.   LIGHT BLUE TOP   GREEN TOP   LAVENDER TOP   GOLD TOP - SST       No orders to display             MDM    Final diagnoses:    Intractable vomiting with nausea, unspecified vomiting type            Kilo Coles MD  12/10/17 [id number]

## 2017-12-11 VITALS
BODY MASS INDEX: 18.29 KG/M2 | RESPIRATION RATE: 16 BRPM | HEIGHT: 64 IN | DIASTOLIC BLOOD PRESSURE: 67 MMHG | WEIGHT: 107.13 LBS | TEMPERATURE: 97 F | HEART RATE: 75 BPM | OXYGEN SATURATION: 90 % | SYSTOLIC BLOOD PRESSURE: 117 MMHG

## 2017-12-11 LAB
ADV 40+41 DNA STL QL NAA+NON-PROBE: NOT DETECTED
ALBUMIN SERPL-MCNC: 3 G/DL (ref 3.4–4.8)
ALBUMIN/GLOB SERPL: 0.9 G/DL (ref 1.1–1.8)
ALP SERPL-CCNC: 47 U/L (ref 38–126)
ALT SERPL W P-5'-P-CCNC: 28 U/L (ref 9–52)
ANION GAP SERPL CALCULATED.3IONS-SCNC: 8 MMOL/L (ref 5–15)
AST SERPL-CCNC: 19 U/L (ref 14–36)
ASTRO TYP 1-8 RNA STL QL NAA+NON-PROBE: NOT DETECTED
BASOPHILS # BLD AUTO: 0.01 10*3/MM3 (ref 0–0.2)
BASOPHILS NFR BLD AUTO: 0.2 % (ref 0–2)
BILIRUB SERPL-MCNC: 0.5 MG/DL (ref 0.2–1.3)
BUN BLD-MCNC: 10 MG/DL (ref 7–21)
BUN/CREAT SERPL: 15.4 (ref 7–25)
C CAYETANENSIS DNA STL QL NAA+NON-PROBE: NOT DETECTED
C DIFF TOX GENS STL QL NAA+PROBE: NORMAL
CALCIUM SPEC-SCNC: 7.2 MG/DL (ref 8.4–10.2)
CAMPY SP DNA.DIARRHEA STL QL NAA+PROBE: NOT DETECTED
CHLORIDE SERPL-SCNC: 109 MMOL/L (ref 95–110)
CO2 SERPL-SCNC: 21 MMOL/L (ref 22–31)
CREAT BLD-MCNC: 0.65 MG/DL (ref 0.5–1)
CRYPTOSP STL CULT: NOT DETECTED
DEPRECATED RDW RBC AUTO: 48.8 FL (ref 36.4–46.3)
E COLI DNA SPEC QL NAA+PROBE: NOT DETECTED
E HISTOLYT AG STL-ACNC: NOT DETECTED
EAEC PAA PLAS AGGR+AATA ST NAA+NON-PRB: NOT DETECTED
EC STX1+STX2 GENES STL QL NAA+NON-PROBE: NOT DETECTED
EOSINOPHIL # BLD AUTO: 0.05 10*3/MM3 (ref 0–0.7)
EOSINOPHIL NFR BLD AUTO: 0.9 % (ref 0–7)
EPEC EAE GENE STL QL NAA+NON-PROBE: NOT DETECTED
ERYTHROCYTE [DISTWIDTH] IN BLOOD BY AUTOMATED COUNT: 15.8 % (ref 11.5–14.5)
ETEC LTA+ST1A+ST1B TOX ST NAA+NON-PROBE: NOT DETECTED
G LAMBLIA DNA SPEC QL NAA+PROBE: NOT DETECTED
GFR SERPL CREATININE-BSD FRML MDRD: 108 ML/MIN/1.73 (ref 39–90)
GLOBULIN UR ELPH-MCNC: 3.3 GM/DL (ref 2.3–3.5)
GLUCOSE BLD-MCNC: 80 MG/DL (ref 60–100)
HCT VFR BLD AUTO: 34.4 % (ref 35–45)
HGB BLD-MCNC: 11.6 G/DL (ref 12–15.5)
IMM GRANULOCYTES # BLD: 0.01 10*3/MM3 (ref 0–0.02)
IMM GRANULOCYTES NFR BLD: 0.2 % (ref 0–0.5)
LYMPHOCYTES # BLD AUTO: 0.67 10*3/MM3 (ref 0.6–4.2)
LYMPHOCYTES NFR BLD AUTO: 12.7 % (ref 10–50)
MAGNESIUM SERPL-MCNC: 2.3 MG/DL (ref 1.6–2.3)
MCH RBC QN AUTO: 28 PG (ref 26.5–34)
MCHC RBC AUTO-ENTMCNC: 33.7 G/DL (ref 31.4–36)
MCV RBC AUTO: 82.9 FL (ref 80–98)
MONOCYTES # BLD AUTO: 0.75 10*3/MM3 (ref 0–0.9)
MONOCYTES NFR BLD AUTO: 14.2 % (ref 0–12)
NEUTROPHILS # BLD AUTO: 3.8 10*3/MM3 (ref 2–8.6)
NEUTROPHILS NFR BLD AUTO: 71.8 % (ref 37–80)
NOROVIRUS GI+II RNA STL QL NAA+NON-PROBE: NOT DETECTED
P SHIGELLOIDES DNA STL QL NAA+NON-PROBE: NOT DETECTED
PLATELET # BLD AUTO: 178 10*3/MM3 (ref 150–450)
PMV BLD AUTO: 10.9 FL (ref 8–12)
POTASSIUM BLD-SCNC: 4 MMOL/L (ref 3.5–5.1)
PROT SERPL-MCNC: 6.3 G/DL (ref 6.3–8.6)
RBC # BLD AUTO: 4.15 10*6/MM3 (ref 3.77–5.16)
RV RNA STL NAA+PROBE: NOT DETECTED
SALMONELLA DNA SPEC QL NAA+PROBE: NOT DETECTED
SAPO I+II+IV+V RNA STL QL NAA+NON-PROBE: NOT DETECTED
SHIGELLA SP+EIEC IPAH ST NAA+NON-PROBE: NOT DETECTED
SODIUM BLD-SCNC: 138 MMOL/L (ref 137–145)
V CHOLERAE DNA SPEC QL NAA+PROBE: NOT DETECTED
VIBRIO DNA SPEC NAA+PROBE: NOT DETECTED
WBC NRBC COR # BLD: 5.29 10*3/MM3 (ref 3.2–9.8)
YERSINIA STL CULT: NOT DETECTED

## 2017-12-11 PROCEDURE — 97165 OT EVAL LOW COMPLEX 30 MIN: CPT

## 2017-12-11 PROCEDURE — 80053 COMPREHEN METABOLIC PANEL: CPT | Performed by: INTERNAL MEDICINE

## 2017-12-11 PROCEDURE — G8989 SELF CARE D/C STATUS: HCPCS

## 2017-12-11 PROCEDURE — G0378 HOSPITAL OBSERVATION PER HR: HCPCS

## 2017-12-11 PROCEDURE — 25010000002 HEPARIN (PORCINE) PER 1000 UNITS: Performed by: INTERNAL MEDICINE

## 2017-12-11 PROCEDURE — G8988 SELF CARE GOAL STATUS: HCPCS

## 2017-12-11 PROCEDURE — 87507 IADNA-DNA/RNA PROBE TQ 12-25: CPT | Performed by: NURSE PRACTITIONER

## 2017-12-11 PROCEDURE — 97116 GAIT TRAINING THERAPY: CPT

## 2017-12-11 PROCEDURE — 83735 ASSAY OF MAGNESIUM: CPT | Performed by: INTERNAL MEDICINE

## 2017-12-11 PROCEDURE — 87999 UNLISTED MICROBIOLOGY PX: CPT | Performed by: NURSE PRACTITIONER

## 2017-12-11 PROCEDURE — 97530 THERAPEUTIC ACTIVITIES: CPT

## 2017-12-11 PROCEDURE — 96372 THER/PROPH/DIAG INJ SC/IM: CPT

## 2017-12-11 PROCEDURE — G8987 SELF CARE CURRENT STATUS: HCPCS

## 2017-12-11 PROCEDURE — 85025 COMPLETE CBC W/AUTO DIFF WBC: CPT | Performed by: INTERNAL MEDICINE

## 2017-12-11 RX ORDER — ONDANSETRON 4 MG/1
4 TABLET, FILM COATED ORAL EVERY 6 HOURS PRN
Qty: 30 TABLET | Refills: 0 | Status: SHIPPED | OUTPATIENT
Start: 2017-12-11 | End: 2018-05-21

## 2017-12-11 RX ADMIN — FAMOTIDINE 20 MG: 20 TABLET, FILM COATED ORAL at 08:54

## 2017-12-11 RX ADMIN — GABAPENTIN 100 MG: 100 CAPSULE ORAL at 08:57

## 2017-12-11 RX ADMIN — PANTOPRAZOLE SODIUM 40 MG: 40 TABLET, DELAYED RELEASE ORAL at 06:57

## 2017-12-11 RX ADMIN — HEPARIN SODIUM 5000 UNITS: 5000 INJECTION, SOLUTION INTRAVENOUS; SUBCUTANEOUS at 06:57

## 2017-12-11 RX ADMIN — AMLODIPINE BESYLATE 5 MG: 5 TABLET ORAL at 08:52

## 2017-12-11 RX ADMIN — HYDROXYCHLOROQUINE SULFATE 200 MG: 200 TABLET, FILM COATED ORAL at 08:55

## 2017-12-11 RX ADMIN — FLUTICASONE PROPIONATE 2 SPRAY: 50 SPRAY, METERED NASAL at 08:54

## 2017-12-11 RX ADMIN — DIVALPROEX SODIUM 500 MG: 250 TABLET, DELAYED RELEASE ORAL at 08:55

## 2017-12-11 NOTE — THERAPY TREATMENT NOTE
Acute Care - Physical Therapy Treatment Note  [facility]     Patient Name: Mary Anne Wolf  : 1945  MRN: [id number]  Today's Date: 2017  Onset of Illness/Injury or Date of Surgery Date: 12/10/17  Date of Referral to PT: 12/10/17  Referring Physician: Dr SONA Bejarano    Admit Date: 12/10/2017    Visit Dx:    ICD-10-CM ICD-9-CM   1. Intractable vomiting with nausea, unspecified vomiting type R11.2 536.2   2. Impaired physical mobility Z74.09 781.99     Patient Active Problem List   Diagnosis   • Essential hypertension   • Neuropathy   • Seizure disorder   • Vertigo   • Systemic lupus erythematosus   • Borderline glaucoma   • Nuclear cataract   • Long-term use of hydroxychloroquine   • Osteoporosis, unspecified   • Intractable vomiting with nausea               Adult Rehabilitation Note       17 0900          Rehab Assessment/Intervention    Discipline physical therapy assistant  -JEANE      Document Type therapy note (daily note)  -JEANE      Subjective Information agree to therapy  -JEANE      Patient Effort, Rehab Treatment adequate  -JEANE      Symptoms Noted During/After Treatment increased pain  -JEANE      Precautions/Limitations fall precautions  -JEANE      Recorded by [JEANE] Jammie Zambrano PTA      Vital Signs    Pre Systolic BP Rehab 117  -JEANE      Pre Treatment Diastolic BP 67  -JEANE      Post Systolic BP Rehab 126  -JEANE      Post Treatment Diastolic BP 62  -JEANE      Posttreatment Heart Rate (beats/min) 78  -JEANE      Pre SpO2 (%) 92  -JEANE      O2 Delivery Pre Treatment room air  -JEANE      Pre Patient Position Supine  -JEANE      Intra Patient Position Standing  -JEANE      Post Patient Position Supine  -JEANE      Recorded by [JEANE] Jammie Zambrano PTA      Pain Assessment    Pain Assessment 0-10  -JEANE      Pain Score 6  -JEANE      Post Pain Score 6  -JEANE      Pain Type Acute pain  -JEANE      Pain Location --   flank  -JEANE      Pain Orientation Right  -JEANE      Pain Intervention(s) Ambulation/increased activity  -JEANE      Recorded  by [JEANE] Jammie Zambrano PTA      Vision Assessment/Intervention    Visual Impairment WFL with corrective lenses  -JEANE      Recorded by [JEANE] Jammie Zambrano PTA      Cognitive Assessment/Intervention    Current Cognitive/Communication Assessment functional  -JEANE      Orientation Status oriented x 4  -JEANE      Follows Commands/Answers Questions 100% of the time  -JEANE      Personal Safety WNL/WFL  -JEANE      Personal Safety Interventions fall prevention program maintained;gait belt;supervised activity  -JEANE      Recorded by [JEANE] Jammie Zambrano PTA      Bed Mobility, Assessment/Treatment    Bed Mobility, Assistive Device bed rails;head of bed elevated  -JEANE      Bed Mobility, Roll Left, [place] supervision required  -JEANE      Bed Mobility, Roll Right, [place] not tested  -JEANE      Bed Mobility, Scoot/Bridge, [place] not tested;conditional independence  -JEANE      Bed Mob, Supine to Sit, [place] conditional independence  -JEANE      Bed Mob, Sit to Supine, [place] conditional independence  -JEANE      Recorded by [JEANE] Jammie Zambrano PTA      Transfer Assessment/Treatment    Transfers, Bed-Chair [place] not tested  -JEANE      Transfers, Chair-Bed [place] not tested  -JEANE      Transfers, Sit-Stand [place] stand by assist  -JEANE      Transfers, Stand-Sit [place] stand by assist  -JEANE      Transfers, Sit-Stand-Sit, Assist Device rolling walker  -JEANE      Toilet Transfer, [place] not tested  -JEANE      Walk-In Shower Transfer, [place] not tested  -JEANE      Bathtub Transfer, [place] not tested  -JEANE      Recorded by [JEANE] Jammie Zambrano PTA      Gait Assessment/Treatment    Gait, [place] Level contact guard assist  -JEANE      Gait, Assistive Device rolling walker  -JEANE      Gait, Distance (Feet) 280  -JEANE      Recorded by [JEANE] Jammie Zambrano PTA      Positioning and Restraints    Pre-Treatment Position in bed  -JEANE      Post Treatment Position bed  -JEANE      In Bed supine;call light within  reach;encouraged to call for assist;with family/caregiver  -JEANE      Recorded by [JEANE] Jammie Zambrano PTA        User Key  (r) = Recorded By, (t) = Taken By, (c) = Cosigned By    Initials Name Effective Dates    JEANE Zambrano PTA 10/17/16 -                 IP PT Goals       12/11/17 0935 12/10/17 1415       Bed Mobility PT STG    Bed Mobility PT STG, Date Established  12/10/17  -CB     Bed Mobility PT STG, Time to Achieve  2 days  -CB     Bed Mobility PT STG, [place] Level  independent  -CB     Bed Mobility PT STG, Additional Goal  HOB down and no rails  -CB     Bed Mobility PT STG, Date Goal Reviewed 12/11/17  -JEANE      Bed Mobility PT STG, Outcome goal ongoing  -JEANE      Transfer Training PT STG    Transfer Training PT STG, Date Established  12/10/17  -CB     Transfer Training PT STG, Time to Achieve  2 - 3 days  -CB     Transfer Training PT STG, Activity Type  bed to chair /chair to bed;sit to stand/stand to sit  -CB     Transfer Training PT STG, [place] Level  independent;conditional independence  -CB     Transfer Training PT STG, Assist Device  cane, straight;walker, rolling  -CB     Transfer Training PT STG, Date Goal Reviewed 12/11/17  -JEANE      Transfer Training PT STG, Outcome goal ongoing  -JEANE      Gait Training PT LTG    Gait Training Goal PT LTG, Date Established  12/10/17  -CB     Gait Training Goal PT LTG, Time to Achieve  by discharge  -CB     Gait Training Goal PT LTG, [place] Level  conditional independence;independent  -CB     Gait Training Goal PT LTG, Assist Device  walker, rolling;cane, straight  -CB     Gait Training Goal PT LTG, Distance to Achieve  200 feet  -CB     Gait Training Goal PT LTG, Date Goal Reviewed 12/11/17  -JEANE      Gait Training Goal PT LTG, Outcome goal ongoing  -JEANE      Strength Goal PT LTG    Strength Goal PT LTG, Date Established  12/10/17  -CB     Strength Goal PT LTG, Time to Achieve  by discharge  -CB     Strength Goal PT LTG, Measure to Achieve  20 reps  all ex BLE sitting /standing actively  -CB     Strength Goal PT LTG, Functional Goal  get legs up onto bed without assistance and following log roll precautions  -CB     Strength Goal PT LTG, Date Goal Reviewed 12/11/17  -JEANE      Strength Goal PT LTG, Outcome goal ongoing  -JEANE      Physical Therapy PT LTG    Physical Therapy PT LTG, Date Established  12/10/17  -     Physical Therapy PT LTG, Time to Achieve  by discharge  -     Physical Therapy PT LTG, Activity Type  be able to log roll without using bed rails and HOB down ,  may assist  -     Physical Therapy PT LTG, [place] Level  conditional independence  -     Physical Therapy PT LTG, Date Goal Reviewed 12/11/17  -      Physical Therapy PT LTG, Outcome goal ongoing  -        User Key  (r) = Recorded By, (t) = Taken By, (c) = Cosigned By    Initials Name Provider Type    CB Emma Tolliver, PT Physical Therapist    JEANE Jammie Zambrano, PTA Physical Therapy Assistant          Physical Therapy Education     Title: PT OT SLP Therapies (Active)     Topic: Physical Therapy (Active)     Point: Mobility training (Active)    Learning Progress Summary    Learner Readiness Method Response Comment Documented by Status   Patient Acceptance D NR   12/10/17 1509 Active   Family Acceptance D NR  CB 12/10/17 1509 Active               Point: Precautions (Active)    Learning Progress Summary    Learner Readiness Method Response Comment Documented by Status   Patient Acceptance D NR   12/10/17 1509 Active   Family Acceptance D NR   12/10/17 1509 Active                      User Key     Initials Effective Dates Name Provider Type Discipline     04/06/17 -  Emma Tolliver, PT Physical Therapist PT                    PT Recommendation and Plan  Anticipated Equipment Needs At Discharge:  (none)  Anticipated Discharge Disposition: home with home health, home with outpatient services  Planned Therapy Interventions: bed mobility training, gait training, home  exercise program, transfer training, strengthening, patient/family education  PT Frequency: other (see comments) (5-14')  Plan of Care Review  Plan Of Care Reviewed With: patient  Progress: progress towards functional goals is fair  Outcome Summary/Follow up Plan: pt demonstrated good overall strength and endurance with mobiltiy and gait progression. Pt is progressing appropriately towards goals and is expected to return home with spouse at D/c          Outcome Measures       12/11/17 0900 12/10/17 1415       How much help from another person do you currently need...    Turning from your back to your side while in flat bed without using bedrails? 2  -JEANE 2  -CB     Moving from lying on back to sitting on the side of a flat bed without bedrails? 2  -JEANE 2  -CB     Moving to and from a bed to a chair (including a wheelchair)? 3  -JEANE 3  -CB     Standing up from a chair using your arms (e.g., wheelchair, bedside chair)? 3  -JEANE 3  -CB     Climbing 3-5 steps with a railing? 2  -JEANE 2  -CB     To walk in hospital room? 3  -JEANE 3  -CB     AM-PAC 6 Clicks Score 15  -JEANE 15  -CB     Functional Assessment    Outcome Measure Options  AM-PAC 6 Clicks Basic Mobility (PT)  -CB       User Key  (r) = Recorded By, (t) = Taken By, (c) = Cosigned By    Initials Name Provider Type    MAKAYLA Tolliver, PT Physical Therapist    JEANE Zambrano PTA Physical Therapy Assistant           Time Calculation:         PT Charges       12/11/17 1034          Time Calculation    Start Time 0935  -JEANE      Stop Time 1013  -JEANE      Time Calculation (min) 38 min  -JEANE      Time Calculation- PT    Total Timed Code Minutes- PT 38 minute(s)  -JEANE        User Key  (r) = Recorded By, (t) = Taken By, (c) = Cosigned By    Initials Name Provider Type    JEANE Zambrano PTA Physical Therapy Assistant          Therapy Charges for Today     Code Description Service Date Service Provider Modifiers Qty    95286568638 HC GAIT TRAINING EA 15 MIN 12/11/2017 Jammie ALLAN  CECIL Zambrano GP 1    [id number]  PT THERAPEUTIC ACT EA 15 MIN 12/11/2017 Jammie Zambrano PTA GP 2          PT G-Codes  PT Professional Judgement Used?: Yes  Outcome Measure Options: AM-PAC 6 Clicks Basic Mobility (PT)  Score: 15  Functional Limitation: Mobility: Walking and moving around  Mobility: Walking and Moving Around Current Status (): At least 40 percent but less than 60 percent impaired, limited or restricted  Mobility: Walking and Moving Around Goal Status (): At least 20 percent but less than 40 percent impaired, limited or restricted    Jammie Zambrano PTA  12/11/2017

## 2017-12-11 NOTE — CONSULTS
"Adult Nutrition  Assessment    Patient Name:  Mary Anne Wolf  YOB: 1945  MRN: [id number]  Admit Date:  12/10/2017    Assessment Date:  12/11/2017    Comments:  Pt is a 72 year old female admitted with intractable N/V possibly caused by pain medication which has now resolved.  RD received consult from APRN to educate pt about high chrissy/high protein diet as pt has BMI 18.4 and reports poor appetite.  Wt down 5-7lb in past few months.  Pt and  reports that she eats small snacks,  RD encouraged to continue to eat several small meals but to focus on increasing calorie and protein content of foods.  Pt cannot tolerate dairy but does like sherbet and peanut butter.  Encouraged pt to add fats when cooking greens.  Pt has never tried Boost or Ensure, which is suitable for lactose intolerance.  RD provided samples of Boost and Ensure to take home as well as coupons.  Also advised pt to take medications with food to avoid nausea.  RD will provide services as needed, plan is for pt to d/c today.          Reason for Assessment       12/11/17 1245    Reason for Assessment    Reason For Assessment/Visit identified at risk by screening criteria;education;nurse/nurse practitioner consult    Identified At Risk By Screening Criteria MST SCORE 2+;BMI    Factors Affecting Nutrition Factors    Appetite Poor                Nutrition/Diet History       12/11/17 1254    Nutrition/Diet History    Typical Food/Fluid Intake Pt reports poor appetite and she and  indicate she eats small snacks throughout the day.  Pt does not tolerate dairy.  N/V resolved.    Supplemental Drinks/Foods/Additives Pt has never tried Boost or Ensure but willing.            Anthropometrics       12/11/17 1256    Anthropometrics (Special Considerations)    Height Used for Calculations 1.626 m (5' 4\")    Weight Used for Calculations 48.5 kg (107 lb)    RD Calculated     RD Calculated % IBW 89    Usual Body Weight (UBW)    Weight " "Loss --   5-7lb    Weight Loss Time Frame 1-2 months    Body Mass Index (BMI)    BMI Grade 17 - 19 low grade I            Labs/Tests/Procedures/Meds       12/11/17 1257    Labs/Tests/Procedures/Meds    Labs/Tests Review Reviewed    Medication Review Reviewed, pertinent            Physical Findings       12/11/17 1258    Physical Findings/Assessment    Additional Documentation Physical Appearance (Group)    Physical Appearance    Overall Physical Appearance underweight            Estimated/Assessed Needs       12/11/17 1258    Calculation Measurements    Weight Used For Calculations 48.5 kg (107 lb)    Height Used for Calculations 1.626 m (5' 4\")    Estimated/Assessed Energy Needs    Energy Need Method [place] Jeor    Age 72    RMR ([place]-St[place] Jeor Equation) 975.35    Activity Factors ([place] St Jeor)  Out of bed, ambulatory  1.3    Total estimated needs ([place] St[place] Jeor) [phone]    Estimated/Assessed Protein Needs    Weight Used for Protein Calculation 54.4 kg (120 lb)    Protein (gm/kg) 1.2    1.2 Gm Protein (gm) 65.32    Estimated Protein Range 54-65    Estimated/Assessed Fluid Needs    Fluid Need Method Other (comment)   1500ml            Nutrition Prescription Ordered       12/11/17 1300    Nutrition Prescription PO    Current PO Diet Regular    Common Modifiers Cardiac            Evaluation of Received Nutrient/Fluid Intake       12/11/17 1300    PO Evaluation    Number of Meals 1    % PO Intake 50            Electronically signed by:  Della Manzano RD  12/11/17 1:02 PM  "

## 2017-12-11 NOTE — PLAN OF CARE
Problem: Patient Care Overview (Adult)  Goal: Plan of Care Review  Outcome: Ongoing (interventions implemented as appropriate)    Problem: Pain, Acute (Adult)  Goal: Identify Related Risk Factors and Signs and Symptoms  Outcome: Outcome(s) achieved Date Met:  12/11/17  Goal: Acceptable Pain Control/Comfort Level  Outcome: Ongoing (interventions implemented as appropriate)    Problem: Fluid Volume Deficit (Adult)  Goal: Identify Related Risk Factors and Signs and Symptoms  Outcome: Outcome(s) achieved Date Met:  12/11/17  Goal: Fluid/Electrolyte Balance  Outcome: Ongoing (interventions implemented as appropriate)  Goal: Comfort/Well Being  Outcome: Ongoing (interventions implemented as appropriate)

## 2017-12-11 NOTE — DISCHARGE SUMMARY
[facility] Medicine Services  DISCHARGE SUMMARY       Date of Admission: 12/10/2017  Date of Discharge:  12/11/2017  Primary Care Physician: Bess Suárez DO    Presenting Problem/History of Present Illness:  Intractable vomiting with nausea, unspecified vomiting type [R11.2]       Final Discharge Diagnoses:  Hospital Problem List     * (Principal)Intractable vomiting with nausea    Essential hypertension    Seizure disorder    Systemic lupus erythematosus          Consults:   Consults     No orders found for last 30 day(s).            Pertinent Test Results:   Lab Results (last 24 hours)     Procedure Component Value Units Date/Time    Urinalysis With / Culture If Indicated - [[id number]]  (Abnormal) Collected:  12/10/17 1603    Specimen:  Urine Updated:  12/10/17 1623     Color, UA Yellow     Appearance, UA Clear     pH, UA 6.0     Specific Gravity, UA 1.075 (H)      Result obtained by Refractometer        Glucose, UA Negative     Ketones, UA Trace (A)     Bilirubin, UA Negative     Blood, UA Negative     Protein, UA Negative     Leuk Esterase, UA Negative     Nitrite, UA Negative     Urobilinogen, UA 1.0 E.U./dL    Narrative:       Urine microscopic not indicated.    CBC Auto Differential [[id number]]  (Abnormal) Collected:  12/11/17 0530    Specimen:  Blood Updated:  12/11/17 0553     WBC 5.29 10*3/mm3      RBC 4.15 10*6/mm3      Hemoglobin 11.6 (L) g/dL      Hematocrit 34.4 (L) %      MCV 82.9 fL      MCH 28.0 pg      MCHC 33.7 g/dL      RDW 15.8 (H) %      RDW-SD 48.8 (H) fl      MPV 10.9 fL      Platelets 178 10*3/mm3      Neutrophil % 71.8 %      Lymphocyte % 12.7 %      Monocyte % 14.2 (H) %      Eosinophil % 0.9 %      Basophil % 0.2 %      Immature Grans % 0.2 %      Neutrophils, Absolute 3.80 10*3/mm3      Lymphocytes, Absolute 0.67 10*3/mm3      Monocytes, Absolute 0.75 10*3/mm3      Eosinophils, Absolute 0.05 10*3/mm3      Basophils, Absolute 0.01 10*3/mm3       Immature Grans, Absolute 0.01 10*3/mm3     Magnesium [[id number]]  (Normal) Collected:  12/11/17 0530    Specimen:  Blood Updated:  12/11/17 0618     Magnesium 2.3 mg/dL     Comprehensive Metabolic Panel [[id number]]  (Abnormal) Collected:  12/11/17 0530    Specimen:  Blood Updated:  12/11/17 0619     Glucose 80 mg/dL      BUN 10 mg/dL      Creatinine 0.65 mg/dL      Sodium 138 mmol/L      Potassium 4.0 mmol/L      Chloride 109 mmol/L      CO2 21.0 (L) mmol/L      Calcium 7.2 (L) mg/dL      Total Protein 6.3 g/dL      Albumin 3.00 (L) g/dL      ALT (SGPT) 28 U/L      AST (SGOT) 19 U/L      Alkaline Phosphatase 47 U/L      Total Bilirubin 0.5 mg/dL      eGFR  African Amer 108 (H) mL/min/1.73      Globulin 3.3 gm/dL      A/G Ratio 0.9 (L) g/dL      BUN/Creatinine Ratio 15.4     Anion Gap 8.0 mmol/L     Narrative:       The MDRD GFR formula is only valid for adults with stable renal function between ages 18 and 70.    Gastrointestinal Panel, PCR - Stool, Per Rectum [[id number]]  (Normal) Collected:  12/11/17 0631    Specimen:  Stool from Per Rectum Updated:  12/11/17 0853     Campylobacter Not Detected     Clostridium difficile (toxin A/B) NA formed stool      Corrected report    Stool was formed and C diff not reported  Corrected result. Previous result was Not Detected on 12/11/2017 at 0845 CST        Plesiomonas shigelloides Not Detected     Salmonella Not Detected     Vibrio Not Detected     Vibrio cholerae Not Detected     Yersinia enterocolitica Not Detected     Enteroaggregative E. coli (EAEC) Not Detected     Enteropathogenic E. coli (EPEC) Not Detected     Enterotoxigenic E. coli (ETEC) lt/st Not Detected     Shiga-like toxin-producing E. coli (STEC) stx1/stx2 Not Detected     E. coli O157 Not Detected     Shigella/Enteroinvasive E. coli (EIEC) Not Detected     Cryptosporidium Not Detected     Cyclospora cayetanensis Not Detected     Entamoeba histolytica Not Detected     Giardia lamblia Not Detected      Adenovirus F40/41 Not Detected     Astrovirus Not Detected     Norovirus GI/GII Not Detected     Rotavirus A Not Detected     Sapovirus (I, II, IV or V) Not Detected    Narrative:         Testing performed by multiplex PCR system.        Imaging Results (last 24 hours)     Procedure Component Value Units Date/Time    CT Abdomen Pelvis With Contrast [[id number]] Collected:  12/10/17 1333     Updated:  12/10/17 1068    Narrative:         Patient Name: SHAHEED RODARTE    ORDERING: CHAPINCITO KIM    ATTENDING: MANPREET HOLDER     REFERRING: CHAPINCITO KIM    -----------------------  EXAM DESCRIPTION: CT ABDOMEN PELVIS W CONTRAST    HISTORY: 72-year-old female with history given for right side  abdominal/ back pain, R11.2 Nausea with vomiting, unspecified    COMPARISON: None    Dose Length Product: 289.90.    This exam was performed according to our departmental dose  optimization program, which includes automated exposure control,  adjustment of the mA and/or KV according to patient size and/or  use of iterative reconstruction technique.      CONTRAST:   Oral and 80 cc intravenous Isovue 300.      TECHNIQUE: Multiple contiguous contrast enhanced axial images are  obtained of the abdomen and pelvis.     FINDINGS:     LOWER CHEST: Small area of peripheral consolidation seen within  the left posterolateral lower lobe and within the right middle  lobe. These may represent atelectasis. No effusion. There is  borderline cardiac size. No pericardial effusion.    HEPATOBILIARY: There is a 1.3 cm hypodensity within the right  lobe of the liver near the dome that may represent a cyst or  hemangioma. An additional 8 mm hypodensity within the left lobe  of the liver with similar differential. No suspicious hepatic  lesion or ductal dilation. No calcified gallstones or  pericholecystic inflammatory change.  SPLEEN: Unremarkable.  PANCREAS: No acute or suspicious finding.  ADRENAL GLANDS: Unremarkable.  KIDNEYS/URETERS: The renal  margins are smooth. No suspicious  renal mass, calculi, or findings of obstructive uropathy.    GASTROINTESTINAL: The oral contrast is seen passing from the  stomach into the large bowel. Addition of a sizable duodenal  diverticulum. No findings of small bowel obstruction. No small  bowel wall thickening. The ileocecal junction is unremarkable.  Normal appendix is identified. There is moderate volume of stool  within the redundant colon. Diverticular changes are present with  the greatest concentration in the sigmoid and descending colon.  No findings of acute diverticulitis.  REPRODUCTIVE ORGANS: Hysterectomy  URINARY BLADDER: Unremarkable    VASCULAR: Vascular calcification without abdominal aortic  aneurysm  LYMPH NODES: No pathologically enlarged nodes by size criteria.  PERITONEUM/RETROPERITONEUM: No free air or free fluid..     OSSEOUS STRUCTURES: Multilevel severe changes within a  levoscoliotic thoracolumbar spine. There is significant  progression of the compression fracture seen acutely on July 7, 2017. The finding is considered moderate-severe eccentric to the  right. There is retropulsion of the posterior superior margin  into the central canal contributing to mild effacement of the  thecal sac. There is severe degenerative disc disease and  associated changes at L3-4 and L4-5 and moderate changes at  L5-S1.    ADDITIONAL FINDINGS: None      Impression:       No CT evidence of acute abdominal or pelvic finding.    Small peripheral areas of consolidation within the left lower and  right middle lobes. These likely represent atelectasis. No  effusion.    Small hepatic cysts versus hemangiomas.    Diverticulosis without acute diverticulitis.    Progression of the compression fracture seen acutely on July 7, 2017. Multilevel moderate-severe degenerative changes within the  levoscoliotic spine.    Electronically signed by:  Damian Banerjee MD  12/10/2017 5:36 PM  CST Workstation: [id number]        Chief  "Complaint on Day of Discharge: No complaints     Hospital Course:  The patient is a 72 y.o. female who presented to [facility] with Abdominal pain, nausea, and vomiting.  The patient was seen in the emergency room on 12/9/2017 due to right gluteal and leg pain and was prescribed Lortab.  Patient's spouse states that her appetite has decreased and she had not eaten before taking her Lortab.  The patient states the abdominal pain nausea and vomiting have resolved.  A nutritional consult was given and high calorie high-protein options were given to the patient along with coupons.  The patient is given a prescription for Zofran.  She will follow-up with her primary care provider in one week.      Condition on Discharge:  Stable    Physical Exam on Discharge:  /67 (BP Location: Right arm, Patient Position: Lying)  Pulse 75  Temp 97 °F (36.1 °C) (Oral)   Resp 16  Ht 162.6 cm (64\")  Wt 48.6 kg (107 lb 2 oz)  LMP  (LMP Unknown)  SpO2 90%  BMI 18.39 kg/m2  Physical Exam   Constitutional: She is oriented to person, place, and time. She appears well-developed and well-nourished.   HENT:   Head: Normocephalic and atraumatic.   Eyes: EOM are normal. Pupils are equal, round, and reactive to light.   Neck: Normal range of motion. Neck supple.   Cardiovascular: Normal rate and regular rhythm.    Pulmonary/Chest: Effort normal and breath sounds normal.   Abdominal: Soft. Bowel sounds are normal.   Musculoskeletal: Normal range of motion.   Neurological: She is alert and oriented to person, place, and time.   Skin: Skin is warm and dry.   Psychiatric: She has a normal mood and affect.     Discharge Disposition:  Home-Health Care [facility] [place]    Discharge Medications:   Mary Anne Wolf   Home Medication Instructions ZAINA:[id number]    Printed on:12/11/17 9921   Medication Information                      amLODIPine (NORVASC) 5 MG tablet  Take 1 tablet by mouth Daily.             cetirizine (zyrTEC) 10 MG " tablet  Take 1 tablet by mouth Daily.             Cyanocobalamin (VITAMIN B-12 ER) 1500 MCG tablet controlled-release  Take 1 tablet by mouth Daily.             denosumab (PROLIA) 60 MG/ML solution syringe  Inject 60 mg under the skin 1 (One) Time.             dexlansoprazole (DEXILANT) 30 MG capsule  Take 1 capsule by mouth Daily.             divalproex (DEPAKOTE) 500 MG DR tablet  Take 1 tablet by mouth Daily.             fluticasone (FLONASE) 50 MCG/ACT nasal spray  2 sprays into each nostril Daily.             gabapentin (NEURONTIN) 300 MG capsule  Take 1 capsule by mouth 3 (Three) Times a Day.             hydroxychloroquine (PLAQUENIL) 200 MG tablet  Take 1 tablet by mouth Daily.             meclizine (ANTIVERT) 25 MG tablet  Take 1 tablet by mouth 3 (Three) Times a Day As Needed for dizziness.             ondansetron (ZOFRAN) 4 MG tablet  Take 1 tablet by mouth Every 6 (Six) Hours As Needed for Nausea or Vomiting.             sucralfate (CARAFATE) 1 g tablet  Take 1 tablet by mouth 4 (Four) Times a Day As Needed (stomach pain, reflux).                 Discharge Diet:   Diet Instructions     Diet: Regular       Discharge Diet:  Regular                 Activity at Discharge:   Activity Instructions     Activity as Tolerated                     Discharge Care Plan/Instructions: Prescription is given for Zofran.  The patient will follow up with her primary care physician within one week.  Home health was ordered for PT, OT and dietitian for increased calorie intake.    Follow-up Appointments:   Future Appointments  Date Time Provider Department Center   12/18/2017 2:30 PM Bess Suárez DO [facility] None   1/5/2018 11:00 AM Bess Suárez DO [facility] None   2/19/2018 9:30 AM Bess Suárez DO [facility] None   4/24/2018 10:45 AM DAVID TOMLINSON [facility] PROCEDURE CHAIR DAVID LOPEZ       This document has been electronically signed by MARINE Hamilton on December 11, 2017 4:07 PM        Time: Greater than 30  minutes.

## 2017-12-11 NOTE — PLAN OF CARE
Problem: Patient Care Overview (Adult)  Goal: Plan of Care Review  Outcome: Ongoing (interventions implemented as appropriate)    12/11/17 0935   Coping/Psychosocial Response Interventions   Plan Of Care Reviewed With patient   Outcome Evaluation   Outcome Summary/Follow up Plan pt demonstrated good overall strength and endurance with mobiltiy and gait progression. Pt is progressing appropriately towards goals and is expected to return home with spouse at D/c   Patient Care Overview   Progress progress towards functional goals is fair         Problem: Inpatient Physical Therapy  Goal: Bed Mobility Goal STG- PT  Outcome: Ongoing (interventions implemented as appropriate)    12/10/17 1415 12/11/17 0935   Bed Mobility PT STG   Bed Mobility PT STG, Date Established 12/10/17 --    Bed Mobility PT STG, Time to Achieve 2 days --    Bed Mobility PT STG, [place] Level independent --    Bed Mobility PT STG, Additional Goal HOB down and no rails --    Bed Mobility PT STG, Date Goal Reviewed --  12/11/17   Bed Mobility PT STG, Outcome --  goal ongoing       Goal: Transfer Training Goal 1 STG- PT  Outcome: Ongoing (interventions implemented as appropriate)    12/10/17 1415 12/11/17 0935   Transfer Training PT STG   Transfer Training PT STG, Date Established 12/10/17 --    Transfer Training PT STG, Time to Achieve 2 - 3 days --    Transfer Training PT STG, Activity Type bed to chair /chair to bed;sit to stand/stand to sit --    Transfer Training PT STG, [place] Level independent;conditional independence --    Transfer Training PT STG, Assist Device cane, straight;walker, rolling --    Transfer Training PT STG, Date Goal Reviewed --  12/11/17   Transfer Training PT STG, Outcome --  goal ongoing       Goal: Gait Training Goal LTG- PT  Outcome: Ongoing (interventions implemented as appropriate)    12/10/17 1415 12/11/17 0935   Gait Training PT LTG   Gait Training Goal PT LTG, Date Established 12/10/17 --    Gait Training  Goal PT LTG, Time to Achieve by discharge --    Gait Training Goal PT LTG, [place] Level conditional independence;independent --    Gait Training Goal PT LTG, Assist Device walker, rolling;cane, straight --    Gait Training Goal PT LTG, Distance to Achieve 200 feet --    Gait Training Goal PT LTG, Date Goal Reviewed --  12/11/17   Gait Training Goal PT LTG, Outcome --  goal ongoing       Goal: Strength Goal LTG- PT  Outcome: Ongoing (interventions implemented as appropriate)    12/10/17 1415 12/11/17 0935   Strength Goal PT LTG   Strength Goal PT LTG, Date Established 12/10/17 --    Strength Goal PT LTG, Time to Achieve by discharge --    Strength Goal PT LTG, Measure to Achieve 20 reps all ex BLE sitting /standing actively --    Strength Goal PT LTG, Functional Goal get legs up onto bed without assistance and following log roll precautions --    Strength Goal PT LTG, Date Goal Reviewed --  12/11/17   Strength Goal PT LTG, Outcome --  goal ongoing       Goal: Physical Therapy Goal LTG- PT  Outcome: Ongoing (interventions implemented as appropriate)    12/10/17 1415 12/11/17 0935   Physical Therapy PT LTG   Physical Therapy PT LTG, Date Established 12/10/17 --    Physical Therapy PT LTG, Time to Achieve by discharge --    Physical Therapy PT LTG, Activity Type be able to log roll without using bed rails and HOB down ,  may assist --    Physical Therapy PT LTG, [place] Level conditional independence --    Physical Therapy PT LTG, Date Goal Reviewed --  12/11/17   Physical Therapy PT LTG, Outcome --  goal ongoing

## 2017-12-11 NOTE — THERAPY DISCHARGE NOTE
Acute Care - Physical Therapy Discharge Summary  [facility]       Patient Name: Mary Anne Wolf  : 1945  MRN: [id number]    Today's Date: 2017  Onset of Illness/Injury or Date of Surgery Date: 12/10/17    Date of Referral to PT: 12/10/17  Referring Physician: Dr. SONA Bejarano      Admit Date: 12/10/2017      PT Recommendation and Plan    Visit Dx:    ICD-10-CM ICD-9-CM   1. Intractable vomiting with nausea, unspecified vomiting type R11.2 536.2   2. Impaired physical mobility Z74.09 781.99   3. Essential hypertension I10 401.9   4. Seizure disorder G40.909 345.90   5. Neuropathy G62.9 355.9   6. Vertigo R42 780.4   7. Borderline glaucoma of both eyes H40.003 365.00   8. Nuclear cataract H25.10 366.16   9. Long-term use of hydroxychloroquine Z79.899 V58.69   10. Osteoporosis, unspecified osteoporosis type, unspecified pathological fracture presence M81.0 733.00             Outcome Measures       17 1300 17 0900 12/10/17 1415    How much help from another person do you currently need...    Turning from your back to your side while in flat bed without using bedrails?  2  -JEANE 2  -CB    Moving from lying on back to sitting on the side of a flat bed without bedrails?  2  -JEANE 2  -CB    Moving to and from a bed to a chair (including a wheelchair)?  3  -JEANE 3  -CB    Standing up from a chair using your arms (e.g., wheelchair, bedside chair)?  3  -JEANE 3  -CB    Climbing 3-5 steps with a railing?  2  -JEANE 2  -CB    To walk in hospital room?  3  -JEANE 3  -CB    AM-PAC 6 Clicks Score  15  -JEANE 15  -CB    How much help from another is currently needed...    Putting on and taking off regular lower body clothing? 4  -NN      Bathing (including washing, rinsing, and drying) 4  -NN      Toileting (which includes using toilet bed pan or urinal) 4  -NN      Putting on and taking off regular upper body clothing 4  -NN      Taking care of personal grooming (such as brushing teeth) 4  -NN      Eating meals 4  -NN       Score 24  -NN      Functional Assessment    Outcome Measure Options AM-PAC 6 Clicks Daily Activity (OT)  -NN  AM-PAC 6 Clicks Basic Mobility (PT)  -CB      User Key  (r) = Recorded By, (t) = Taken By, (c) = Cosigned By    Initials Name Provider Type    CB Emma Tolliver, PT Physical Therapist    JEANE Zambrano, CECIL Physical Therapy Assistant    NN Yeny Parikh, OTR/L Occupational Therapist                PT Charges       12/11/17 1034          Time Calculation    Start Time 0935  -JEANE      Stop Time 1013  -JEANE      Time Calculation (min) 38 min  -JEANE      Time Calculation- PT    Total Timed Code Minutes- PT 38 minute(s)  -JEANE        User Key  (r) = Recorded By, (t) = Taken By, (c) = Cosigned By    Initials Name Provider Type    JEANE Zambrano, CECIL Physical Therapy Assistant                  IP PT Goals       12/11/17 1401 12/11/17 0935 12/10/17 1415    Bed Mobility PT STG    Bed Mobility PT STG, Date Established   12/10/17  -CB    Bed Mobility PT STG, Time to Achieve   2 days  -CB    Bed Mobility PT STG, [place] Level   independent  -CB    Bed Mobility PT STG, Additional Goal   HOB down and no rails  -CB    Bed Mobility PT STG, Date Goal Reviewed 12/11/17  -CB 12/11/17  -JEANE     Bed Mobility PT STG, Outcome goal not met  -CB goal ongoing  -JEANE     Bed Mobility PT STG, Reason Goal Not Met discharged from facility  -CB      Transfer Training PT STG    Transfer Training PT STG, Date Established   12/10/17  -CB    Transfer Training PT STG, Time to Achieve   2 - 3 days  -CB    Transfer Training PT STG, Activity Type   bed to chair /chair to bed;sit to stand/stand to sit  -CB    Transfer Training PT STG, [place] Level   independent;conditional independence  -CB    Transfer Training PT STG, Assist Device   cane, straight;walker, rolling  -CB    Transfer Training PT STG, Date Goal Reviewed 12/11/17  -CB 12/11/17  -JEANE     Transfer Training PT STG, Outcome goal not met  -CB goal ongoing  -JEANE     Transfer  Training PT STG, Reason Goal Not Met discharged from facility  -      Gait Training PT LTG    Gait Training Goal PT LTG, Date Established   12/10/17  -    Gait Training Goal PT LTG, Time to Achieve   by discharge  -    Gait Training Goal PT LTG, [place] Level   conditional independence;independent  -    Gait Training Goal PT LTG, Assist Device   walker, rolling;cane, straight  -    Gait Training Goal PT LTG, Distance to Achieve   200 feet  -    Gait Training Goal PT LTG, Date Goal Reviewed 12/11/17  -CB 12/11/17  -JEANE     Gait Training Goal PT LTG, Outcome  goal ongoing  -     Gait Training Goal PT LTG, Reason Goal Not Met discharged from facility  -      Strength Goal PT LTG    Strength Goal PT LTG, Date Established   12/10/17  -    Strength Goal PT LTG, Time to Achieve   by discharge  -    Strength Goal PT LTG, Measure to Achieve   20 reps all ex BLE sitting /standing actively  -    Strength Goal PT LTG, Functional Goal   get legs up onto bed without assistance and following log roll precautions  -CB    Strength Goal PT LTG, Date Goal Reviewed 12/11/17  -CB 12/11/17  -JEANE     Strength Goal PT LTG, Outcome  goal ongoing  -     Strength Goal PT LTG, Reason Goal Not Met discharged from facility  -      Physical Therapy PT LTG    Physical Therapy PT LTG, Date Established   12/10/17  -    Physical Therapy PT LTG, Time to Achieve   by discharge  -    Physical Therapy PT LTG, Activity Type   be able to log roll without using bed rails and HOB down ,  may assist  -    Physical Therapy PT LTG, [place] Level   conditional independence  -    Physical Therapy PT LTG, Date Goal Reviewed 12/11/17  -CB 12/11/17  -     Physical Therapy PT LTG, Outcome goal not met  - goal ongoing  -     Physical Therapy PT LTG, Reason Goal Not Met discharged from facility  -        User Key  (r) = Recorded By, (t) = Taken By, (c) = Cosigned By    Initials Name Provider Type    MAKAYLA LOMELI  Unruly, PT Physical Therapist    JEANE Zambrano, PTA Physical Therapy Assistant          Therapy Charges for Today     Code Description Service Date Service Provider Modifiers Qty    61352013348 HC PT MOBILITY CURRENT 12/10/2017 Emma Tolliver, PT GP, CK 1    36371032055 HC PT MOBILITY PROJECTED 12/10/2017 Emma Tolliver, PT GP, CJ 1    54505511025 HC PT EVAL MOD COMPLEXITY 1 12/10/2017 Emma Tolliver, PT GP 1    00738276641 HC PT THERAPEUTIC ACT EA 15 MIN 12/10/2017 Emma Tolliver, PT GP 1          PT Discharge Summary  Anticipated Discharge Disposition: home with home health  Reason for Discharge: Discharge from facility, Per MD order  Outcomes Achieved: Patient able to partially acheive established goals  Discharge Destination: Home with home health      Emma Tolliver, PT   12/11/2017

## 2017-12-11 NOTE — PLAN OF CARE
Problem: Inpatient Physical Therapy  Goal: Bed Mobility Goal STG- PT  Outcome: Unable to achieve outcome(s) by discharge Date Met:  12/11/17    12/10/17 1415 12/11/17 1401   Bed Mobility PT STG   Bed Mobility PT STG, Date Established 12/10/17 --    Bed Mobility PT STG, Time to Achieve 2 days --    Bed Mobility PT STG, [place] Level independent --    Bed Mobility PT STG, Additional Goal HOB down and no rails --    Bed Mobility PT STG, Date Goal Reviewed --  12/11/17   Bed Mobility PT STG, Outcome --  goal not met   Bed Mobility PT STG, Reason Goal Not Met --  discharged from facility       Goal: Transfer Training Goal 1 STG- PT  Outcome: Unable to achieve outcome(s) by discharge Date Met:  12/11/17    12/10/17 1415 12/11/17 1401   Transfer Training PT STG   Transfer Training PT STG, Date Established 12/10/17 --    Transfer Training PT STG, Time to Achieve 2 - 3 days --    Transfer Training PT STG, Activity Type bed to chair /chair to bed;sit to stand/stand to sit --    Transfer Training PT STG, [place] Level independent;conditional independence --    Transfer Training PT STG, Assist Device cane, straight;walker, rolling --    Transfer Training PT STG, Date Goal Reviewed --  12/11/17   Transfer Training PT STG, Outcome --  goal not met   Transfer Training PT STG, Reason Goal Not Met --  discharged from facility       Goal: Gait Training Goal LTG- PT  Outcome: Unable to achieve outcome(s) by discharge Date Met:  12/11/17    12/10/17 1415 12/11/17 1401   Gait Training PT LTG   Gait Training Goal PT LTG, Date Established 12/10/17 --    Gait Training Goal PT LTG, Time to Achieve by discharge --    Gait Training Goal PT LTG, [place] Level conditional independence;independent --    Gait Training Goal PT LTG, Assist Device walker, rolling;cane, straight --    Gait Training Goal PT LTG, Distance to Achieve 200 feet --    Gait Training Goal PT LTG, Date Goal Reviewed --  12/11/17   Gait Training Goal PT LTG,  Reason Goal Not Met --  discharged from facility       Goal: Strength Goal LTG- PT  Outcome: Unable to achieve outcome(s) by discharge Date Met:  12/11/17    12/10/17 1415 12/11/17 1401   Strength Goal PT LTG   Strength Goal PT LTG, Date Established 12/10/17 --    Strength Goal PT LTG, Time to Achieve by discharge --    Strength Goal PT LTG, Measure to Achieve 20 reps all ex BLE sitting /standing actively --    Strength Goal PT LTG, Functional Goal get legs up onto bed without assistance and following log roll precautions --    Strength Goal PT LTG, Date Goal Reviewed --  12/11/17   Strength Goal PT LTG, Reason Goal Not Met --  discharged from facility       Goal: Physical Therapy Goal LTG- PT  Outcome: Unable to achieve outcome(s) by discharge Date Met:  12/11/17    12/10/17 1415 12/11/17 1401   Physical Therapy PT LTG   Physical Therapy PT LTG, Date Established 12/10/17 --    Physical Therapy PT LTG, Time to Achieve by discharge --    Physical Therapy PT LTG, Activity Type be able to log roll without using bed rails and HOB down ,  may assist --    Physical Therapy PT LTG, [place] Level conditional independence --    Physical Therapy PT LTG, Date Goal Reviewed --  12/11/17   Physical Therapy PT LTG, Outcome --  goal not met   Physical Therapy PT LTG, Reason Goal Not Met --  discharged from facility

## 2017-12-11 NOTE — THERAPY DISCHARGE NOTE
Acute Care - Occupational Therapy Initial Eval/Discharge  [facility]     Patient Name: Mary Anne Wolf  : 1945  MRN: [id number]  Today's Date: 2017  Onset of Illness/Injury or Date of Surgery Date: 12/10/17  Date of Referral to OT: 12/10/17  Referring Physician: Dr. SONA Bejarano      Admit Date: 12/10/2017       ICD-10-CM ICD-9-CM   1. Intractable vomiting with nausea, unspecified vomiting type R11.2 536.2   2. Impaired physical mobility Z74.09 781.99   3. Essential hypertension I10 401.9   4. Seizure disorder G40.909 345.90   5. Neuropathy G62.9 355.9   6. Vertigo R42 780.4   7. Borderline glaucoma of both eyes H40.003 365.00   8. Nuclear cataract H25.10 366.16   9. Long-term use of hydroxychloroquine Z79.899 V58.69   10. Osteoporosis, unspecified osteoporosis type, unspecified pathological fracture presence M81.0 733.00     Patient Active Problem List   Diagnosis   • Essential hypertension   • Neuropathy   • Seizure disorder   • Vertigo   • Systemic lupus erythematosus   • Borderline glaucoma   • Nuclear cataract   • Long-term use of hydroxychloroquine   • Osteoporosis, unspecified   • Intractable vomiting with nausea     Past Medical History:   Diagnosis Date   • Acute posthemorrhagic anemia    • Backache     LLE radiculopathy   • Cigarette smoker    • Discoid lupus erythematosus    • Diverticular disease of colon     incomplete colonoscopy to Hepatic flexure.  Rec ACBE, pt. considering   • Dizziness and giddiness    • Dysphagia    • Esophagitis     grade III   • Essential (primary) hypertension    • Essential hypertension    • Essential hypertension    • Gastroesophageal reflux disease     rec pt try RX prilosec   • H/O screening mammography    • Localization-related (focal) (partial) symptomatic epilepsy and epileptic syndromes with complex partial seizures, intractable, without status epilepticus    • Lupus    • Neuropathy    • Neuropathy    • Nicotine dependence    • Osteopenia    •  Periumbilical pain    • Seizure disorder    • Seizure disorder     history of complex partial seizure   • Stricture of esophagus    • Systemic lupus erythematosus    • Systemic lupus erythematosus    • Vertigo    • Vertigo      Past Surgical History:   Procedure Laterality Date   • COLONOSCOPY  08/05/2015   • COLONOSCOPY  07/08/2014    REFUSED BY PATIENT   • COLONOSCOPY  08/05/2015    a diverticulum was found in the sigmoid colon   • ENDOSCOPY  08/05/2015    Esophageal stricture was present. Dilatatin was performed. Esophagitis . Biopsy taken. Normal stomach. Normal duodenum   • INJECTION OF MEDICATION      Rocehin (2)          OT ASSESSMENT FLOWSHEET (last 72 hours)      OT Evaluation       12/11/17 1305 12/11/17 0900 12/10/17 1415 12/10/17 1000       Rehab Evaluation    Document Type evaluation  -NN therapy note (daily note)  -JEANE evaluation  -CB      Subjective Information no complaints;agree to therapy  -NN agree to therapy  -JEANE agree to therapy;complains of;pain  -CB      Patient Effort, Rehab Treatment  adequate  -JEANE adequate  -CB      Symptoms Noted During/After Treatment  increased pain  -JEANE increased pain  -CB      Symptoms Noted Comment   been having  pain across low back for few days without any know cause  -CB      General Information    Patient Profile Review yes  -NN  yes  -CB      Onset of Illness/Injury or Date of Surgery Date 12/10/17  -NN  12/10/17  -CB      Referring Physician Dr. SONA Bejarano  -NN  Dr SONA Bejarano  -CB      General Observations walking in room, alert  -NN  alying in bed with  present, IV , telemetry  -CB      Pertinent History Of Current Problem abdominal pain and n/v  -NN  abdominal pain nausea and vomititn 2-3 days  -CB      Precautions/Limitations  fall precautions  -JEANE fall precautions   log roll- history of compression frx L1  -CB      Prior Level of Function independent:;all household mobility;community mobility;ADL's  -NN  independent:;gait;ADL's  -CB      Equipment Currently  Used at Home none  -NN  none   has rolling walker and s/p cane at home if needed  -CB cane, straight  -ME     Plans/Goals Discussed With patient;agreed upon  -NN  patient  -CB      Risks Reviewed patient:;LOB;nausea/vomiting;dizziness;increased discomfort;change in vital signs;increased drainage;lines disloged  -NN  patient:;nausea/vomiting;increased discomfort  -CB      Benefits Reviewed patient:;improve function;increase independence;increase strength;increase balance;decrease pain;decrease risk of DVT;improve skin integrity;increase knowledge  -NN  patient:;improve function;decrease pain;decrease risk of DVT  -CB      Barriers to Rehab none identified  -NN        Living Environment    Lives With spouse  -NN  spouse  -CB spouse  -ME     Living Arrangements house  -NN  house  -CB house  -ME     Home Accessibility ramps present at home  -NN  ramps present at home  -CB no concerns  -ME     Stair Railings at Home outside, present at both sides  -NN  outside, present at both sides  -CB outside, present at both sides  -ME     Type of Financial/Environmental Concern    none  -ME     Transportation Available   family or friend will provide  -CB car  -ME     Clinical Impression    Date of Referral to OT 12/10/17  -NN        OT Diagnosis decreased adl  -NN        Prognosis good  -NN        Patient/Family Goals Statement go home and do xmas lights  -NN        Criteria for Skilled Therapeutic Interventions Met no problems identified which require skilled intervention;no  -NN        Therapy Frequency evaluation only  -NN        Anticipated Discharge Disposition home  -NN        Functional Level Prior    Ambulation    1-->assistive equipment  -ME     Transferring    1-->assistive equipment  -ME     Toileting    1-->assistive equipment  -ME     Bathing    1-->assistive equipment  -ME     Dressing    2-->assistive person  -ME     Eating    0-->independent  -ME     Communication    0-->understands/communicates without difficulty   -ME     Swallowing    0-->swallows foods/liquids without difficulty  -ME     Prior Functional Level Comment    patial independance  -ME     Vital Signs    Pre Systolic BP Rehab  117  -JEANE 136  -CB      Pre Treatment Diastolic BP  67  -JEANE 63  -CB      Post Systolic BP Rehab  126  -JEANE 141  -CB      Post Treatment Diastolic BP  62  -JEANE 67  -CB      Pretreatment Heart Rate (beats/min)   69  -CB      Posttreatment Heart Rate (beats/min)  78  -JEANE 71  -CB      Pre SpO2 (%)  92  -JEANE 91  -CB      O2 Delivery Pre Treatment  room air  -JEANE room air  -CB      Post SpO2 (%)   93  -CB      O2 Delivery Post Treatment   room air  -CB      Pre Patient Position  Supine  -JEANE Supine  -CB      Intra Patient Position  Standing  -JEANE Standing  -CB      Post Patient Position  Supine  -JEANE Supine  -CB      Pain Assessment    Pain Assessment No/denies pain  -NN 0-10  -JEANE 0-10  -CB      Pain Score  6  -JEANE 9  -CB      Post Pain Score  6  -JEANE 8  -CB      Pain Type  Acute pain  -JEANE       Pain Location  --   flank  -JEANE Hip  -CB      Pain Orientation  Right  -JEANE Right  -CB      Pain Intervention(s)  Ambulation/increased activity  -JEANE Ambulation/increased activity;Repositioned  -CB      Vision Assessment/Intervention    Visual Impairment WFL with corrective lenses  -NN WFL with corrective lenses  -JEANE WFL with corrective lenses  -CB      Cognitive Assessment/Intervention    Current Cognitive/Communication Assessment functional  -NN functional  -JEANE functional  -CB      Orientation Status oriented x 4  -NN oriented x 4  -JEANE oriented x 4  -CB      Follows Commands/Answers Questions 100% of the time;able to follow multi-step instructions  -% of the time  -JEANE 100% of the time  -CB      Personal Safety WNL/WFL  -NN WNL/WFL  -JEANE WNL/WFL  -CB      Personal Safety Interventions fall prevention program maintained;muscle strengthening facilitated;nonskid shoes/slippers when out of bed  -NN fall prevention program maintained;gait belt;supervised activity  -JEAEN  fall prevention program maintained;nonskid shoes/slippers when out of bed  -CB      ROM (Range of Motion)    General ROM no range of motion deficits identified  -NN  no range of motion deficits identified  -CB      MMT (Manual Muscle Testing)    General MMT Assessment no strength deficits identified  -NN  lower extremity strength deficits identified  -CB      General MMT Assessment Detail   grossly 3+/5 BLE hip and knees, 4/5 ankle  -CB      Mobility Assessment/Training    Extremity Weight-Bearing Status   left lower extremity;right lower extremity  -CB      Bed Mobility, Assessment/Treatment    Bed Mobility, Assistive Device  bed rails;head of bed elevated  -       Bed Mobility, Roll Left, [place]  supervision required  -       Bed Mobility, Roll Right, [place]  not tested  -JEANE       Bed Mobility, Scoot/Bridge, [place]  not tested;conditional independence  -JEANE       Bed Mob, Supine to Sit, [place]  conditional independence  -JEANE moderate assist (50% patient effort)  -CB      Bed Mob, Sit to Supine, [place]  conditional independence  -JEANE minimum assist (75% patient effort)  -CB      Bed Mobility, Impairments   pain;strength decreased  -      Bed Mobility, Comment up in room  -NN        Transfer Assessment/Treatment    Transfers, Bed-Chair [place]  not tested  -JEANE       Transfers, Chair-Bed [place]  not tested  -JEANE       Transfers, Sit-Stand [place]  stand by assist  - minimum assist (75% patient effort)  -CB      Transfers, Stand-Sit [place]  stand by assist  - minimum assist (75% patient effort)  -CB      Transfers, Sit-Stand-Sit, Assist Device  rolling walker  - --   handheld assist of therapist  -      Toilet Transfer, [place]  not tested  -       Walk-In Shower Transfer, [place]  not tested  -JEANE       Bathtub Transfer, [place]  not tested  -JEANE       Transfer, Impairments   pain;strength decreased  -      Transfer, Comment up in room ,  standing in robles with RN after leaving  -NN        Functional Mobility    Functional Mobility- Ind. Level independent  -NN        Functional Mobility-Distance (Feet) --   in room in robles  -NN        Functional Mobility- Safety Issues step length decreased  -NN        Lower Body Dressing Assessment/Training    LB Dressing Assess/Train, Clothing Type doffing:;donning:;slipper socks  -NN        LB Dressing Assess/Train, Position standing  -NN        LB Dressing Assess/Train, [place] independent  -NN        Sensory Assessment/Intervention    Sensory Impairment --   WNL per pt  -NN        Light Touch   LLE;RLE  -CB      LLE Light Touch   WNL  -CB      RLE Light Touch   WNL  -CB      Positioning and Restraints    Pre-Treatment Position standing in room  -NN in bed  -JEANE in bed  -CB      Post Treatment Position other  -NN bed  -JEANE bed  -CB      In Bed  supine;call light within reach;encouraged to call for assist;with family/caregiver  -JEANE supine;call light within reach;encouraged to call for assist;exit alarm on;with family/caregiver;side rails up x2  -CB      Other Position with nsg  -NN          User Key  (r) = Recorded By, (t) = Taken By, (c) = Cosigned By    Initials Name Effective Dates    CB Emma Tolliver, PT 04/06/17 -     JEANE Jammie Zambrano, PTA 10/17/16 -     ME Yulia Sapp, RN 10/17/16 -     NN Yeny Parikh, OTR/L 11/08/17 -           Occupational Therapy Education     Title: PT OT SLP Therapies (Active)     Topic: Occupational Therapy (Resolved)     Point: ADL training (Resolved)    Description: Instruct learner(s) on proper safety adaptation and remediation techniques during self care or transfers.   Instruct in proper use of assistive devices.    Learning Progress Summary    Learner Readiness Method Response Comment Documented by Status   Patient Acceptance JUAN F MIRELES Pt. educated on home safety, benefit of continued activity, dc NN 12/11/17 1321 Done               Point: Home exercise program  (Resolved)    Description: Instruct learner(s) on appropriate technique for monitoring, assisting and/or progressing therapeutic exercises/activities.    Learning Progress Summary    Learner Readiness Method Response Comment Documented by Status   Patient Acceptance E JUAN F RAMOS Pt. educated on home safety, benefit of continued activity, [facility] 12/11/17 1321 Done               Point: Body mechanics (Resolved)    Description: Instruct learner(s) on proper positioning and spine alignment during self-care, functional mobility activities and/or exercises.    Learning Progress Summary    Learner Readiness Method Response Comment Documented by Status   Patient Acceptance E JUAN F RAMOS Pt. educated on home safety, benefit of continued activity, [facility] 12/11/17 1321 Done                      User Key     Initials Effective Dates Name Provider Type Discipline     11/08/17 -  Yeny Parikh, OTR/L Occupational Therapist OT                OT Recommendation and Plan  Anticipated Discharge Disposition: home  Therapy Frequency: evaluation only                  Outcome Measures       12/11/17 1300 12/11/17 0900 12/10/17 1415    How much help from another person do you currently need...    Turning from your back to your side while in flat bed without using bedrails?  2  -JEANE 2  -CB    Moving from lying on back to sitting on the side of a flat bed without bedrails?  2  -JENAE 2  -CB    Moving to and from a bed to a chair (including a wheelchair)?  3  -JEANE 3  -CB    Standing up from a chair using your arms (e.g., wheelchair, bedside chair)?  3  -JEANE 3  -CB    Climbing 3-5 steps with a railing?  2  -JEANE 2  -CB    To walk in hospital room?  3  -JEANE 3  -CB    AM-PAC 6 Clicks Score  15  -JEANE 15  -CB    How much help from another is currently needed...    Putting on and taking off regular lower body clothing? 4  -NN      Bathing (including washing, rinsing, and drying) 4  -NN      Toileting (which includes using toilet bed pan or urinal) 4  -NN       Putting on and taking off regular upper body clothing 4  -NN      Taking care of personal grooming (such as brushing teeth) 4  -NN      Eating meals 4  -NN      Score 24  -NN      Functional Assessment    Outcome Measure Options AM-PAC 6 Clicks Daily Activity (OT)  -NN  AM-PAC 6 Clicks Basic Mobility (PT)  -CB      User Key  (r) = Recorded By, (t) = Taken By, (c) = Cosigned By    Initials Name Provider Type    CB Emma Tolliver, PT Physical Therapist    JEANE Jammie Zambrano, PTA Physical Therapy Assistant    NN Yeny Parikh, OTR/L Occupational Therapist          Time Calculation:         Time Calculation- OT       12/11/17 1322          Time Calculation- OT    OT Start Time 1300  -NN      OT Stop Time 1315  -NN      OT Time Calculation (min) 15 min  -NN      OT Received On 12/11/17  -NN        User Key  (r) = Recorded By, (t) = Taken By, (c) = Cosigned By    Initials Name Provider Type    NN Yeny Parikh OTR/L Occupational Therapist          Therapy Charges for Today     Code Description Service Date Service Provider Modifiers Qty    47123341548 HC OT EVAL LOW COMPLEXITY 1 12/11/2017 Yeny Parikh OTR/L GO 1    10237373254 HC OT SELFCARE CURRENT 12/11/2017 Yeny Parikh OTR/L GO, CH 1    07457629466 HC OT SELFCARE PROJECTED 12/11/2017 Yeny Parikh OTR/L GO, CH 1    22277638432 HC OT SELFCARE DISCHARGE 12/11/2017 Yeny Parikh OTR/L GO, CH 1          OT G-codes  OT Functional Scales Options: AM-PAC 6 Clicks Daily Activity (OT)  Functional Limitation: Self care  Self Care Current Status (): 0 percent impaired, limited or restricted  Self Care Goal Status (): 0 percent impaired, limited or restricted  Self Care Discharge Status (): 0 percent impaired, limited or restricted    OT Discharge Summary  Anticipated Discharge Disposition: home    HARSHAD Castro/DANIKA  12/11/2017

## 2018-02-19 ENCOUNTER — OFFICE VISIT (OUTPATIENT)
Dept: FAMILY MEDICINE CLINIC | Facility: CLINIC | Age: 73
End: 2018-02-19

## 2018-02-19 ENCOUNTER — DOCUMENTATION (OUTPATIENT)
Dept: FAMILY MEDICINE CLINIC | Facility: CLINIC | Age: 73
End: 2018-02-19

## 2018-02-19 VITALS
BODY MASS INDEX: 17.79 KG/M2 | DIASTOLIC BLOOD PRESSURE: 68 MMHG | HEIGHT: 64 IN | WEIGHT: 104.2 LBS | SYSTOLIC BLOOD PRESSURE: 104 MMHG | HEART RATE: 84 BPM | OXYGEN SATURATION: 93 %

## 2018-02-19 DIAGNOSIS — R51.9 CHRONIC NONINTRACTABLE HEADACHE, UNSPECIFIED HEADACHE TYPE: ICD-10-CM

## 2018-02-19 DIAGNOSIS — G89.29 CHRONIC NONINTRACTABLE HEADACHE, UNSPECIFIED HEADACHE TYPE: ICD-10-CM

## 2018-02-19 DIAGNOSIS — I10 ESSENTIAL HYPERTENSION: Primary | ICD-10-CM

## 2018-02-19 DIAGNOSIS — R42 VERTIGO: ICD-10-CM

## 2018-02-19 PROCEDURE — 99214 OFFICE O/P EST MOD 30 MIN: CPT | Performed by: FAMILY MEDICINE

## 2018-02-19 RX ORDER — AMLODIPINE BESYLATE 2.5 MG/1
2.5 TABLET ORAL DAILY
Qty: 30 TABLET | Refills: 11 | Status: SHIPPED | OUTPATIENT
Start: 2018-02-19 | End: 2018-03-02 | Stop reason: SDUPTHER

## 2018-02-19 RX ORDER — MECLIZINE HYDROCHLORIDE 25 MG/1
25 TABLET ORAL 3 TIMES DAILY PRN
Qty: 30 TABLET | Refills: 5 | Status: SHIPPED | OUTPATIENT
Start: 2018-02-19 | End: 2019-05-02 | Stop reason: SDUPTHER

## 2018-02-19 NOTE — PROGRESS NOTES
Subjective   Chief Complaint   Patient presents with   • Follow-up     3 mo follow up       Mary Anne Wolf is a 72 y.o. female who presents for Follow-up (3 mo follow up)   History of Present Illness:   Has still been having headaches. flonase helped somewhat and she is using it daily. Headaches are frontal, bilateral. Does not radiate. No n/v or aura with it. No photophobia. She is not sure how bp is running at home. She also occasionally has ear pain/fullness. No pain in temples. No ringing in ears. Headaches resolve with tylenol. Last few minutes to hours sometimes. Occur several times a week. Does not wake her from sleep. She only drinks 12 oz of water daily.  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 Said no treatment needed at this point. She currently denies any visual disturbance.  Saw neuromufti. Had ?EEG, not sure of results. Patient denies any seizures in > 1 year. States they mentioned the headaches, but not really addressed.     Denies chest pain or sob.       The following portions of the patient's history were reviewed and updated as appropriate:problem list, current medications, allergies, past family history, past medical history, past social history and past surgical history    Past Medical History:   Diagnosis Date   • Acute posthemorrhagic anemia    • Backache     LLE radiculopathy   • Cigarette smoker    • Discoid lupus erythematosus    • Diverticular disease of colon     incomplete colonoscopy to Hepatic flexure.  Rec ACBE, pt. considering   • Dizziness and giddiness    • Dysphagia    • Esophagitis     grade III   • Essential (primary) hypertension    • Essential hypertension    • Essential hypertension    • Gastroesophageal reflux disease     rec pt try RX prilosec   • H/O screening mammography    • Localization-related (focal) (partial) symptomatic epilepsy and epileptic syndromes with complex partial seizures, intractable, without status epilepticus    • Lupus     • Neuropathy    • Neuropathy    • Nicotine dependence    • Osteopenia    • Periumbilical pain    • Seizure disorder    • Seizure disorder     history of complex partial seizure   • Stricture of esophagus    • Systemic lupus erythematosus    • Systemic lupus erythematosus    • Vertigo    • Vertigo        Social History   Substance Use Topics   • Smoking status: Current Every Day Smoker     Packs/day: 0.50     Types: Cigarettes   • Smokeless tobacco: Never Used      Comment: 2 a day   • Alcohol use No     History   Sexual Activity   • Sexual activity: Defer       Medications:  Outpatient Medications Prior to Visit   Medication Sig Dispense Refill   • cetirizine (zyrTEC) 10 MG tablet Take 1 tablet by mouth Daily. 30 tablet 11   • Cyanocobalamin (VITAMIN B-12 ER) 1500 MCG tablet controlled-release Take 1 tablet by mouth Daily.     • denosumab (PROLIA) 60 MG/ML solution syringe Inject 60 mg under the skin 1 (One) Time.     • divalproex (DEPAKOTE) 500 MG DR tablet Take 1 tablet by mouth Daily. 30 tablet 11   • fluticasone (FLONASE) 50 MCG/ACT nasal spray 2 sprays into each nostril Daily. 1 bottle 11   • gabapentin (NEURONTIN) 300 MG capsule Take 1 capsule by mouth 3 (Three) Times a Day. 90 capsule 5   • hydroxychloroquine (PLAQUENIL) 200 MG tablet Take 1 tablet by mouth Daily. 30 tablet 2   • ondansetron (ZOFRAN) 4 MG tablet Take 1 tablet by mouth Every 6 (Six) Hours As Needed for Nausea or Vomiting. 30 tablet 0   • sucralfate (CARAFATE) 1 g tablet Take 1 tablet by mouth 4 (Four) Times a Day As Needed (stomach pain, reflux). 120 tablet 1   • amLODIPine (NORVASC) 5 MG tablet Take 1 tablet by mouth Daily. 30 tablet 11   • dexlansoprazole (DEXILANT) 30 MG capsule Take 1 capsule by mouth Daily. 30 capsule 5   • meclizine (ANTIVERT) 25 MG tablet Take 1 tablet by mouth 3 (Three) Times a Day As Needed for dizziness. 30 tablet 2     No facility-administered medications prior to visit.        Allergies   Allergen Reactions   •  "Milk-Related Compounds        Review of Systems   Constitutional: Negative for fatigue, fever and unexpected weight change.   HENT: Negative.    Eyes: Negative.    Respiratory: Negative for shortness of breath.    Cardiovascular: Negative for chest pain, palpitations and leg swelling.   Gastrointestinal: Negative for abdominal pain, constipation, diarrhea, nausea and vomiting.   Endocrine: Negative.    Genitourinary: Negative.    Musculoskeletal: Negative.    Skin: Negative.    Neurological:        See HPI   Psychiatric/Behavioral: Negative for dysphoric mood and sleep disturbance.       Objective   Visit Vitals   • /68 (BP Location: Left arm, Patient Position: Sitting, Cuff Size: Large Adult)   • Pulse 84   • Ht 162.6 cm (64.02\")   • Wt 47.3 kg (104 lb 3.2 oz)   • LMP  (LMP Unknown)   • SpO2 93%   • BMI 17.88 kg/m2       Physical Exam   Constitutional: She is oriented to person, place, and time. She appears well-developed and well-nourished. No distress.   HENT:   Head: Normocephalic.   Right Ear: Tympanic membrane and ear canal normal.   Left Ear: Tympanic membrane and ear canal normal.   Nose: Nose normal. Right sinus exhibits no frontal sinus tenderness. Left sinus exhibits no frontal sinus tenderness.   Mouth/Throat: Mucous membranes are dry.   No temporal tenderness   Eyes: Conjunctivae are normal.   Neck: Normal range of motion.   Pulmonary/Chest: Effort normal. No respiratory distress.   Abdominal: She exhibits no distension.   Musculoskeletal: Normal range of motion. She exhibits no edema.   Neurological: She is alert and oriented to person, place, and time. No cranial nerve deficit.   No focal deficits   Skin: She is not diaphoretic.   Psychiatric: She has a normal mood and affect. Her behavior is normal.   Nursing note and vitals reviewed.      Assessment/Plan   Mary Anne Wolf is a 72 y.o. female seen today for the following:     Diagnosis Plan   1. Essential hypertension     2. Vertigo  " meclizine (ANTIVERT) 25 MG tablet   3. Chronic nonintractable headache, unspecified headache type       Headaches of unclear etiology. Differential: dehydration vs low bp vs allergies. Less likely tension headache or migraines. No alarm features.   Will lower amlodipine to 2.5 mg. She will return next week for bp recheck and to see if headaches still present.   Also advised to use flonase every day. Advised to also drink more water as may be due to dehydration.  We will call her neurologist to see if she can get appt any sooner as this was one of the main reasons for seeing her.    Patient is aware that I will be leaving the practice in the next few months and they will establish with another PCP.   Follow up with primary care in 3 months.    Follow up: Return for please schedule nurse visit with merlene for BP check.          This document has been electronically signed by Bess Suárez DO on February 19, 2018 1:04 PM

## 2018-02-19 NOTE — PROGRESS NOTES
Appt. At the [facility] center for Prolia inj. Is April 24 at 10:45 am sent card to patient.

## 2018-02-27 ENCOUNTER — TELEPHONE (OUTPATIENT)
Dept: FAMILY MEDICINE CLINIC | Facility: CLINIC | Age: 73
End: 2018-02-27

## 2018-02-27 NOTE — TELEPHONE ENCOUNTER
Patient came in for BP check today after we lowered her BP med last week. BP today was 120/82. Spoke to patient. Her headaches have improved by lowering her bp med. BP now in better range. Will continue current dose of amlodipine. Advised her if headaches return or worsen she should be seen by new primary care provider asashoaib.    Bess Suárez DO  2/27/2018  9:11 AM    This note was electronically signed.

## 2018-03-01 ENCOUNTER — TELEPHONE (OUTPATIENT)
Dept: FAMILY MEDICINE CLINIC | Facility: CLINIC | Age: 73
End: 2018-03-01

## 2018-03-01 DIAGNOSIS — K76.9 LIVER LESION: ICD-10-CM

## 2018-03-01 DIAGNOSIS — R63.4 WEIGHT LOSS: Primary | ICD-10-CM

## 2018-03-01 NOTE — TELEPHONE ENCOUNTER
Spoke to patient to check up on her. She is still having weight loss, decreased appetite. Explained with hx of being smoker she needs CXR. She understands and will come get this done tomorrow. Also needs colonoscopy eval as she has not had one. I have referred her to GI. She voiced understanding.     Bess Suárez,   3/1/2018  2:25 PM    This note was electronically signed.

## 2018-03-02 RX ORDER — AMLODIPINE BESYLATE 2.5 MG/1
2.5 TABLET ORAL DAILY
Qty: 30 TABLET | Refills: 11 | Status: SHIPPED | OUTPATIENT
Start: 2018-03-02 | End: 2019-03-12 | Stop reason: SDUPTHER

## 2018-04-18 ENCOUNTER — OFFICE VISIT (OUTPATIENT)
Dept: GASTROENTEROLOGY | Facility: CLINIC | Age: 73
End: 2018-04-18

## 2018-04-18 VITALS
WEIGHT: 102.6 LBS | SYSTOLIC BLOOD PRESSURE: 118 MMHG | HEIGHT: 61 IN | BODY MASS INDEX: 19.37 KG/M2 | DIASTOLIC BLOOD PRESSURE: 74 MMHG

## 2018-04-18 DIAGNOSIS — D62 ACUTE POSTHEMORRHAGIC ANEMIA: ICD-10-CM

## 2018-04-18 DIAGNOSIS — K59.09 OTHER CONSTIPATION: ICD-10-CM

## 2018-04-18 DIAGNOSIS — K74.00 HEPATIC FIBROSIS: ICD-10-CM

## 2018-04-18 DIAGNOSIS — R16.0 LIVER MASSES: Primary | ICD-10-CM

## 2018-04-18 PROCEDURE — 99214 OFFICE O/P EST MOD 30 MIN: CPT | Performed by: PHYSICIAN ASSISTANT

## 2018-04-18 RX ORDER — CELECOXIB 200 MG/1
200 CAPSULE ORAL DAILY
COMMUNITY
Start: 2018-04-11 | End: 2018-05-12

## 2018-04-18 RX ORDER — HYDROXYCHLOROQUINE SULFATE 200 MG/1
200 TABLET, FILM COATED ORAL
COMMUNITY
Start: 2018-04-11 | End: 2018-04-18 | Stop reason: DRUGHIGH

## 2018-04-18 RX ORDER — GABAPENTIN 400 MG/1
400 CAPSULE ORAL
COMMUNITY
Start: 2018-04-11 | End: 2018-04-18 | Stop reason: DRUGHIGH

## 2018-04-18 RX ORDER — FLUTICASONE PROPIONATE 50 MCG
2 SPRAY, SUSPENSION (ML) NASAL
COMMUNITY
Start: 2017-11-17 | End: 2018-04-18 | Stop reason: SDUPTHER

## 2018-04-18 RX ORDER — AMLODIPINE BESYLATE 2.5 MG/1
2.5 TABLET ORAL
COMMUNITY
Start: 2018-03-02 | End: 2018-04-18 | Stop reason: SDUPTHER

## 2018-04-18 NOTE — PATIENT INSTRUCTIONS
Failure to Thrive, Adult  Failure to thrive is a group of problems. These problems include eating too little and losing weight. People who have this condition may do fewer and fewer activities over time. They may lose interest in being with friends or they may not want to eat or drink.  Follow these instructions at home:  · Take medicines only as told by your doctor.  · Eat a healthy, well-balanced diet. Make sure that you eat enough.  · Be active. Do strength training. A physical therapist can help to set up an exercise program that fits you.  · Make sure that you are safe at home.  · Make sure that you have a plan for what to do if you cannot make decisions for yourself.  Contact a doctor if:  · You are not able to eat well.  · You are not able to move around.  · You feel very sad.  · You feel very hopeless.  Get help right away if:  · You think about ending your life.  · You cannot eat or drink.  · You do not get out of bed.  · Staying at home is not safe.  · You have a fever.  This information is not intended to replace advice given to you by your health care provider. Make sure you discuss any questions you have with your health care provider.  Document Released: 12/06/2012 Document Revised: 05/25/2017 Document Reviewed: 03/14/2016  [employer] Interactive Patient Education © 2017 [employer]  MyPlate from [employer]  The general, healthful diet is based on the 2010 Dietary Guidelines for Americans. The amount of food you need to eat from each food group depends on your age, sex, and level of physical activity and can be individualized by a dietitian. Go to ChooseMyPlate.gov for more information.  What do I need to know about the MyPlate plan?  · Enjoy your food, but eat less.  · Avoid oversized portions.  ¨ ½ of your plate should include fruits and vegetables.  ¨ ¼ of your plate should be grains.  ¨ ¼ of your plate should be protein.  Grains   · Make at least half of your grains whole grains.  · For a 2,000 calorie  daily food plan, eat 6 oz every day.  · 1 oz is about 1 slice bread, 1 cup cereal, or ½ cup cooked rice, cereal, or pasta.  Vegetables   · Make half your plate fruits and vegetables.  · For a 2,000 calorie daily food plan, eat 2½ cups every day.  · 1 cup is about 1 cup raw or cooked vegetables or vegetable juice or 2 cups raw leafy greens.  Fruits   · Make half your plate fruits and vegetables.  · For a 2,000 calorie daily food plan, eat 2 cups every day.  · 1 cup is about 1 cup fruit or 100% fruit juice or ½ cup dried fruit.  Protein   · For a 2,000 calorie daily food plan, eat 5½ oz every day.  · 1 oz is about 1 oz meat, poultry, or fish, ¼ cup cooked beans, 1 egg, 1 Tbsp peanut butter, or ½ oz nuts or seeds.  Dairy   · Switch to fat-free or low-fat (1%) milk.  · For a 2,000 calorie daily food plan, eat 3 cups every day.  · 1 cup is about 1 cup milk or yogurt or soy milk (soy beverage), 1½ oz natural cheese, or 2 oz processed cheese.  Fats, Oils, and Empty Calories   · Only small amounts of oils are recommended.  · Empty calories are calories from solid fats or added sugars.  · Compare sodium in foods like soup, bread, and frozen meals. Choose the foods with lower numbers.  · Drink water instead of sugary drinks.  What foods can I eat?  Grains   Whole grains such as whole wheat, quinoa, millet, and bulgur. Bread, rolls, and pasta made from whole grains. Brown or wild rice. Hot or cold cereals made from whole grains and without added sugar.  Vegetables   All fresh vegetables, especially fresh red, dark green, or orange vegetables. Peas and beans. Low-sodium frozen or canned vegetables prepared without added salt. Low-sodium vegetable juices.  Fruits   All fresh, frozen, and dried fruits. Canned fruit packed in water or fruit juice without added sugar. Fruit juices without added sugar.  Meats and Other Protein Sources   Boiled, baked, or grilled lean meat trimmed of fat. Skinless poultry. Fresh seafood and  shellfish. Canned seafood packed in water. Unsalted nuts and unsalted nut butters. Tofu. Dried beans and pea. Eggs.  Dairy   Low-fat or fat-free milk, yogurt, and cheeses.  Sweets and Desserts   Frozen desserts made from low-fat milk.  Fats and Oils   Olive, peanut, and canola oils and margarine. Salad dressing and mayonnaise made from these oils.  Other   Soups and casseroles made from allowed ingredients and without added fat or salt.  The items listed above may not be a complete list of recommended foods or beverages. Contact your dietitian for more options.   What foods are not recommended?  Grains   Sweetened, low-fiber cereals. Packaged baked goods. Snack crackers and chips. Cheese crackers, butter crackers, and biscuits. Frozen waffles, sweet breads, doughnuts, pastries, packaged baking mixes, pancakes, cakes, and cookies.  Vegetables   Regular canned or frozen vegetables or vegetables prepared with salt. Canned tomatoes. Canned tomato sauce. Fried vegetables. Vegetables in cream sauce or cheese sauce.  Fruits   Fruits packed in syrup or made with added sugar.  Meats and Other Protein Sources   Marbled or fatty meats such as ribs. Poultry with skin. Fried meats, poultry, eggs, or fish. Sausages, hot dogs, and deli meats such as pastrami, bologna, or salami.  Dairy   Whole milk, cream, cheeses made from whole milk, sour cream. Ice cream or yogurt made from whole milk or with added sugar.  Beverages   For adults, no more than one alcoholic drink per day. Regular soft drinks or other sugary beverages. Juice drinks.  Sweets and Desserts   Sugary or fatty desserts, candy, and other sweets.  Fats and Oils   Solid shortening or partially hydrogenated oils. Solid margarine. Margarine that contains trans fats. Butter.  The items listed above may not be a complete list of foods and beverages to avoid. Contact your dietitian for more information.   This information is not intended to replace advice given to you by your  health care provider. Make sure you discuss any questions you have with your health care provider.  Document Released: 01/06/2009 Document Revised: 05/25/2017 Document Reviewed: 11/26/2014  [employer] Interactive Patient Education © 2017 [employer]  BMI for Adults  Body mass index (BMI) is a number that is calculated from a person's weight and height. In most adults, the number is used to find how much of an adult's weight is made up of fat. BMI is not as accurate as a direct measure of body fat.  How is BMI calculated?  BMI is calculated by dividing weight in kilograms by height in meters squared. It can also be calculated by dividing weight in pounds by height in inches squared, then multiplying the resulting number by 703. Charts are available to help you find your BMI quickly and easily without doing this calculation.  How is BMI interpreted?  Health care professionals use BMI charts to identify whether an adult is underweight, at a normal weight, or overweight based on the following guidelines:  · Underweight: BMI less than 18.5.  · Normal weight: BMI between 18.5 and 24.9.  · Overweight: BMI between 25 and 29.9.  · Obese: BMI of 30 and above.  BMI is usually interpreted the same for males and females.  Weight includes both fat and muscle, so someone with a muscular build, such as an athlete, may have a BMI that is higher than 24.9. In cases like these, BMI may not accurately depict body fat. To determine if excess body fat is the cause of a BMI of 25 or higher, further assessments may need to be done by a health care provider.  Why is BMI a useful tool?  BMI is used to identify a possible weight problem that may be related to a medical problem or may increase the risk for medical problems. BMI can also be used to promote changes to reach a healthy weight.  This information is not intended to replace advice given to you by your health care provider. Make sure you discuss any questions you have with your health  care provider.  Document Released: 08/29/2005 Document Revised: 04/27/2017 Document Reviewed: 05/15/2015  [employer] Interactive Patient Education © 2017 [employer]

## 2018-04-18 NOTE — PROGRESS NOTES
Chief Complaint   Patient presents with   • Weight Loss     Ref. Dr. Suárez   • Liver Lesion       ENDO PROCEDURE ORDERED:    Subjective    Mary Anne Wolf is a 72 y.o. female. she is here today for follow-up.    History of Present Illness    Patient presents for evaluation of weight loss and a liver lesion.  Last seen for GERD on 12/14/2015, she did not return for a followup. She was accompanied by a family member.  She recently saw Bess Suárez DO for a headache on 02/19/2018.  The patient currently denies abdominal pain, she takes Carafate as needed for heartburn.  Denied nausea, vomiting, dysphagia.  She states she gets constipated but takes an over-the-counter laxative and does well.  Her weight is down 7 pounds since last visit. Previous EGD/colonoscopy on 08/05/2015 showed esophageal strictures, esophagitis, sigmoid diverticulosis.    Laboratory:  On 12/11/2017:  GI pathogen panel was negative.  Magnesium normal.  CMP showed CO2 of 21, calcium 7.2, albumin 3.2, otherwise normal.  CBC showed mild anemia with hemoglobin 11.6, hematocrit 34.4.  Urinalysis showed trace abnormalities, normal lipase.      Studies:  On 12/10/2017, CT scan abdomen and pelvis with contrast showed atelectasis of the lungs, a 1.3 cm hypodensity in the right lobe of the liver with 8 mm density in the left lobe, possible cyst versus hemangioma, duodenal diverticulum, moderate stool, compression fracture of the lumbar spine.  Flat and upright of the abdomen showed old compression fracture at L1.  Repeat chest x-ray was okay on 03/02/2018.  Most recent laboratories she states done by her lupus doctor on 04/11/2018, showed normal CMP, CRP, ferritin 93.7.  CBC showed white count 3.3, hemoglobin 12.0, hematocrit 37.9, 198,000 platelets.    A/P:  Patient with recent CT imaging with contrast showing probable cyst versus hemangioma in the right and left lobes of the liver, most recent liver enzymes have been normal.  I suspect these are  likely benign, but I did recommend since it has been more than 3 months followup imaging, will do ultrasound of the liver to try to see these lesions again.  Will also do AFP, INR, she agrees to follow up after the above, further pending clinical course and the results of the above.       The following portions of the patient's history were reviewed and updated as appropriate:   Past Medical History:   Diagnosis Date   • Acute posthemorrhagic anemia    • Backache     LLE radiculopathy   • Cigarette smoker    • Discoid lupus erythematosus    • Diverticular disease of colon     incomplete colonoscopy to Hepatic flexure.  Rec ACBE, pt. considering   • Dizziness and giddiness    • Dysphagia    • Esophagitis     grade III   • Essential (primary) hypertension    • Essential hypertension    • Essential hypertension    • Gastroesophageal reflux disease     rec pt try RX prilosec   • H/O screening mammography    • Localization-related (focal) (partial) symptomatic epilepsy and epileptic syndromes with complex partial seizures, intractable, without status epilepticus    • Lupus    • Neuropathy    • Neuropathy    • Nicotine dependence    • Osteopenia    • Periumbilical pain    • Seizure disorder    • Seizure disorder     history of complex partial seizure   • Stricture of esophagus    • Systemic lupus erythematosus    • Systemic lupus erythematosus    • Vertigo    • Vertigo      Past Surgical History:   Procedure Laterality Date   • COLONOSCOPY  08/05/2015   • COLONOSCOPY  07/08/2014    REFUSED BY PATIENT   • COLONOSCOPY  08/05/2015    a diverticulum was found in the sigmoid colon   • ENDOSCOPY  08/05/2015    Esophageal stricture was present. Dilatatin was performed. Esophagitis . Biopsy taken. Normal stomach. Normal duodenum   • INJECTION OF MEDICATION      Rocehin (2)   • UPPER GASTROINTESTINAL ENDOSCOPY  08/05/2015     Family History   Problem Relation Age of Onset   • Lupus Mother    • Rheum arthritis Mother    • Lupus  Sister      OB History      Para Term  AB Living    2 2 2       SAB TAB Ectopic Molar Multiple Live Births                 Allergies   Allergen Reactions   • Milk-Related Compounds      Social History     Social History   • Marital status:      Social History Main Topics   • Smoking status: Current Every Day Smoker     Packs/day: 0.25     Types: Cigarettes   • Smokeless tobacco: Never Used      Comment: 2 a day   • Alcohol use No   • Drug use: No   • Sexual activity: Defer     Other Topics Concern   • Not on file       Current Outpatient Prescriptions:   •  amLODIPine (NORVASC) 2.5 MG tablet, Take 1 tablet by mouth Daily., Disp: 30 tablet, Rfl: 11  •  celecoxib (CeleBREX) 200 MG capsule, Take 200 mg by mouth Daily., Disp: , Rfl:   •  cetirizine (zyrTEC) 10 MG tablet, Take 1 tablet by mouth Daily., Disp: 30 tablet, Rfl: 11  •  Cyanocobalamin (VITAMIN B-12 ER) 1500 MCG tablet controlled-release, Take 1 tablet by mouth Daily., Disp: , Rfl:   •  denosumab (PROLIA) 60 MG/ML solution syringe, Inject 60 mg under the skin 1 (One) Time., Disp: , Rfl:   •  divalproex (DEPAKOTE) 500 MG DR tablet, Take 1 tablet by mouth Daily., Disp: 30 tablet, Rfl: 11  •  fluticasone (FLONASE) 50 MCG/ACT nasal spray, 2 sprays into each nostril Daily., Disp: 1 bottle, Rfl: 11  •  gabapentin (NEURONTIN) 300 MG capsule, Take 1 capsule by mouth 3 (Three) Times a Day., Disp: 90 capsule, Rfl: 5  •  hydroxychloroquine (PLAQUENIL) 200 MG tablet, Take 1 tablet by mouth Daily., Disp: 30 tablet, Rfl: 2  •  meclizine (ANTIVERT) 25 MG tablet, Take 1 tablet by mouth 3 (Three) Times a Day As Needed for dizziness., Disp: 30 tablet, Rfl: 5  •  ondansetron (ZOFRAN) 4 MG tablet, Take 1 tablet by mouth Every 6 (Six) Hours As Needed for Nausea or Vomiting., Disp: 30 tablet, Rfl: 0  •  sucralfate (CARAFATE) 1 g tablet, Take 1 tablet by mouth 4 (Four) Times a Day As Needed (stomach pain, reflux)., Disp: 120 tablet, Rfl: 1  Review of  "Systems  Review of Systems       Objective    /74 (BP Location: Left arm)   Ht 154.9 cm (61\")   Wt 46.5 kg (102 lb 9.6 oz)   LMP  (LMP Unknown)   BMI 19.39 kg/m²   Physical Exam   Constitutional: She is oriented to person, place, and time. She appears well-developed and well-nourished. No distress.   AA   HENT:   Head: Normocephalic and atraumatic.   Eyes: EOM are normal. Pupils are equal, round, and reactive to light.   Neck: Normal range of motion.   Cardiovascular: Normal rate, regular rhythm and normal heart sounds.    Pulmonary/Chest: Effort normal and breath sounds normal.   Abdominal: Soft. Bowel sounds are normal. She exhibits no shifting dullness, no distension, no abdominal bruit, no ascites and no mass. There is no hepatosplenomegaly. There is tenderness. There is no rigidity, no rebound, no guarding and no CVA tenderness. No hernia. Hernia confirmed negative in the ventral area.   Musculoskeletal: Normal range of motion.   Neurological: She is alert and oriented to person, place, and time.   Skin: Skin is warm and dry.   Psychiatric: She has a normal mood and affect. Her behavior is normal. Judgment and thought content normal.   Nursing note and vitals reviewed.    Assessment/Plan      1. Liver masses    2. Acute posthemorrhagic anemia    3. Other constipation    4. Hepatic fibrosis     .   Mary Anne was seen today for weight loss and liver lesion.    Diagnoses and all orders for this visit:    Liver masses  -     Protime-INR  -     AFP Tumor Marker  -     US Liver    Acute posthemorrhagic anemia  -     Protime-INR  -     AFP Tumor Marker  -     US Liver    Other constipation    Hepatic fibrosis   -     AFP Tumor Marker        Orders placed during this encounter include:  Orders Placed This Encounter   Procedures   • US Liver     Scheduling Instructions:      RUQ     Order Specific Question:   Reason for Exam:     Answer:   liver masses   • Protime-INR   • AFP Tumor Marker       Medications " prescribed:  No orders of the defined types were placed in this encounter.    Discontinued Medications       Reason for Discontinue    amLODIPine (NORVASC) 2.5 MG tablet Duplicate order    fluticasone (FLONASE) 50 MCG/ACT nasal spray Duplicate order    hydroxychloroquine (PLAQUENIL) 200 MG tablet Dose adjustment    gabapentin (NEURONTIN) 400 MG capsule Dose adjustment        Requested Prescriptions      No prescriptions requested or ordered in this encounter       Review and/or summary of lab tests, radiology, procedures, medications. Review and summary of old records and obtaining of history. The risks and benefits of my recommendations, as well as other treatment options were discussed with the patient today. Questions were answered.    Follow-up: Return if symptoms worsen or fail to improve, for After the above.     * Surgery not found *      This document has been electronically signed by Jaswant Wallace PA-C on April 21, 2018 7:03 PM      Results for orders placed or performed during the hospital encounter of 12/10/17   Gastrointestinal Panel, PCR - Stool, Per Rectum   Result Value Ref Range    Campylobacter Not Detected Not Detected    Clostridium difficile (toxin A/B) NA formed stool Not Detected, NA formed stool, C diff not applicable on patient less than one year of age    Plesiomonas shigelloides Not Detected Not Detected    Salmonella Not Detected Not Detected    Vibrio Not Detected Not Detected    Vibrio cholerae Not Detected Not Detected    Yersinia enterocolitica Not Detected Not Detected    Enteroaggregative E. coli (EAEC) Not Detected Not Detected    Enteropathogenic E. coli (EPEC) Not Detected Not Detected    Enterotoxigenic E. coli (ETEC) lt/st Not Detected Not Detected    Shiga-like toxin-producing E. coli (STEC) stx1/stx2 Not Detected Not Detected    E. coli O157 Not Detected Not Detected    Shigella/Enteroinvasive E. coli (EIEC) Not Detected Not Detected    Cryptosporidium Not Detected Not  Detected    Cyclospora cayetanensis Not Detected Not Detected    Entamoeba histolytica Not Detected Not Detected    Giardia lamblia Not Detected Not Detected    Adenovirus F40/41 Not Detected Not Detected    Astrovirus Not Detected Not Detected    Norovirus GI/GII Not Detected Not Detected    Rotavirus A Not Detected Not Detected    Sapovirus (I, II, IV or V) Not Detected Not Detected   Gold Top - SST   Result Value Ref Range    Extra Tube Hold for add-ons.    Green Top (Gel)   Result Value Ref Range    Extra Tube Hold for add-ons.    Urinalysis With / Culture If Indicated -   Result Value Ref Range    Color, UA Yellow Yellow, Straw, Dark Yellow, Suzanna    Appearance, UA Clear Clear    pH, UA 6.0 5.0 - 9.0    Specific Gravity, UA 1.075 (H) 1.003 - 1.030    Glucose, UA Negative Negative    Ketones, UA Trace (A) Negative    Bilirubin, UA Negative Negative    Blood, UA Negative Negative    Protein, UA Negative Negative    Leuk Esterase, UA Negative Negative    Nitrite, UA Negative Negative    Urobilinogen, UA 1.0 E.U./dL 0.2 - 1.0 E.U./dL   CBC Auto Differential   Result Value Ref Range    WBC 5.29 3.20 - 9.80 10*3/mm3    RBC 4.15 3.77 - 5.16 10*6/mm3    Hemoglobin 11.6 (L) 12.0 - 15.5 g/dL    Hematocrit 34.4 (L) 35.0 - 45.0 %    MCV 82.9 80.0 - 98.0 fL    MCH 28.0 26.5 - 34.0 pg    MCHC 33.7 31.4 - 36.0 g/dL    RDW 15.8 (H) 11.5 - 14.5 %    RDW-SD 48.8 (H) 36.4 - 46.3 fl    MPV 10.9 8.0 - 12.0 fL    Platelets 178 150 - 450 10*3/mm3    Neutrophil % 71.8 37.0 - 80.0 %    Lymphocyte % 12.7 10.0 - 50.0 %    Monocyte % 14.2 (H) 0.0 - 12.0 %    Eosinophil % 0.9 0.0 - 7.0 %    Basophil % 0.2 0.0 - 2.0 %    Immature Grans % 0.2 0.0 - 0.5 %    Neutrophils, Absolute 3.80 2.00 - 8.60 10*3/mm3    Lymphocytes, Absolute 0.67 0.60 - 4.20 10*3/mm3    Monocytes, Absolute 0.75 0.00 - 0.90 10*3/mm3    Eosinophils, Absolute 0.05 0.00 - 0.70 10*3/mm3    Basophils, Absolute 0.01 0.00 - 0.20 10*3/mm3    Immature Grans, Absolute 0.01 0.00 -  0.02 10*3/mm3   CBC Auto Differential   Result Value Ref Range    WBC 12.89 (H) 3.20 - 9.80 10*3/mm3    RBC 4.77 3.77 - 5.16 10*6/mm3    Hemoglobin 13.0 12.0 - 15.5 g/dL    Hematocrit 39.5 35.0 - 45.0 %    MCV 82.8 80.0 - 98.0 fL    MCH 27.3 26.5 - 34.0 pg    MCHC 32.9 31.4 - 36.0 g/dL    RDW 15.6 (H) 11.5 - 14.5 %    RDW-SD 47.1 (H) 36.4 - 46.3 fl    MPV 11.0 8.0 - 12.0 fL    Platelets 186 150 - 450 10*3/mm3    Neutrophil % 89.3 (H) 37.0 - 80.0 %    Lymphocyte % 3.0 (L) 10.0 - 50.0 %    Monocyte % 7.3 0.0 - 12.0 %    Eosinophil % 0.0 0.0 - 7.0 %    Basophil % 0.1 0.0 - 2.0 %    Immature Grans % 0.3 0.0 - 0.5 %    Neutrophils, Absolute 11.51 (H) 2.00 - 8.60 10*3/mm3    Lymphocytes, Absolute 0.39 (L) 0.60 - 4.20 10*3/mm3    Monocytes, Absolute 0.94 (H) 0.00 - 0.90 10*3/mm3    Eosinophils, Absolute 0.00 0.00 - 0.70 10*3/mm3    Basophils, Absolute 0.01 0.00 - 0.20 10*3/mm3    Immature Grans, Absolute 0.04 (H) 0.00 - 0.02 10*3/mm3   Lavender Top   Result Value Ref Range    Extra Tube hold for add-on    Light Blue Top   Result Value Ref Range    Extra Tube hold for add-on    Magnesium   Result Value Ref Range    Magnesium 2.3 1.6 - 2.3 mg/dL   Lipase   Result Value Ref Range    Lipase 26 23 - 300 U/L   Valproic Acid Level, Total   Result Value Ref Range    Valproic Acid <10.0 (L) 50.0 - 120.0 mcg/mL   Comprehensive Metabolic Panel   Result Value Ref Range    Glucose 80 60 - 100 mg/dL    BUN 10 7 - 21 mg/dL    Creatinine 0.65 0.50 - 1.00 mg/dL    Sodium 138 137 - 145 mmol/L    Potassium 4.0 3.5 - 5.1 mmol/L    Chloride 109 95 - 110 mmol/L    CO2 21.0 (L) 22.0 - 31.0 mmol/L    Calcium 7.2 (L) 8.4 - 10.2 mg/dL    Total Protein 6.3 6.3 - 8.6 g/dL    Albumin 3.00 (L) 3.40 - 4.80 g/dL    ALT (SGPT) 28 9 - 52 U/L    AST (SGOT) 19 14 - 36 U/L    Alkaline Phosphatase 47 38 - 126 U/L    Total Bilirubin 0.5 0.2 - 1.3 mg/dL    eGFR   Amer 108 (H) 39 - 90 mL/min/1.73    Globulin 3.3 2.3 - 3.5 gm/dL    A/G Ratio 0.9 (L) 1.1 -  1.8 g/dL    BUN/Creatinine Ratio 15.4 7.0 - 25.0    Anion Gap 8.0 5.0 - 15.0 mmol/L     *Note: Due to a large number of results and/or encounters for the requested time period, some results have not been displayed. A complete set of results can be found in Results Review.       Some portions of this note have been dictated using voice recognition software and may contain errors and/or omissions.

## 2018-04-24 ENCOUNTER — INFUSION (OUTPATIENT)
Dept: ONCOLOGY | Facility: HOSPITAL | Age: 73
End: 2018-04-24

## 2018-04-24 DIAGNOSIS — M81.0 OSTEOPOROSIS, UNSPECIFIED OSTEOPOROSIS TYPE, UNSPECIFIED PATHOLOGICAL FRACTURE PRESENCE: Primary | ICD-10-CM

## 2018-04-24 PROCEDURE — 96372 THER/PROPH/DIAG INJ SC/IM: CPT | Performed by: NURSE PRACTITIONER

## 2018-04-24 PROCEDURE — 25010000002 DENOSUMAB 60 MG/ML SOLUTION: Performed by: NURSE PRACTITIONER

## 2018-04-24 RX ADMIN — DENOSUMAB 60 MG: 60 INJECTION SUBCUTANEOUS at 10:35

## 2018-04-27 ENCOUNTER — HOSPITAL ENCOUNTER (OUTPATIENT)
Dept: ULTRASOUND IMAGING | Facility: HOSPITAL | Age: 73
Discharge: HOME | End: 2018-04-27
Admitting: PHYSICIAN ASSISTANT

## 2018-04-27 PROCEDURE — 76705 ECHO EXAM OF ABDOMEN: CPT

## 2018-05-21 ENCOUNTER — OFFICE VISIT (OUTPATIENT)
Dept: FAMILY MEDICINE CLINIC | Facility: CLINIC | Age: 73
End: 2018-05-21

## 2018-05-21 VITALS
DIASTOLIC BLOOD PRESSURE: 68 MMHG | SYSTOLIC BLOOD PRESSURE: 112 MMHG | WEIGHT: 100.8 LBS | HEIGHT: 61 IN | BODY MASS INDEX: 19.03 KG/M2

## 2018-05-21 DIAGNOSIS — M32.9 SYSTEMIC LUPUS ERYTHEMATOSUS, UNSPECIFIED SLE TYPE, UNSPECIFIED ORGAN INVOLVEMENT STATUS (HCC): Chronic | ICD-10-CM

## 2018-05-21 DIAGNOSIS — Z12.31 VISIT FOR SCREENING MAMMOGRAM: ICD-10-CM

## 2018-05-21 DIAGNOSIS — Z23 NEED FOR VACCINATION: ICD-10-CM

## 2018-05-21 DIAGNOSIS — I10 ESSENTIAL HYPERTENSION: Primary | Chronic | ICD-10-CM

## 2018-05-21 DIAGNOSIS — Z72.0 TOBACCO USE: ICD-10-CM

## 2018-05-21 DIAGNOSIS — G40.909 SEIZURE DISORDER (HCC): Chronic | ICD-10-CM

## 2018-05-21 DIAGNOSIS — G62.9 NEUROPATHY: Chronic | ICD-10-CM

## 2018-05-21 DIAGNOSIS — K21.9 GASTROESOPHAGEAL REFLUX DISEASE, ESOPHAGITIS PRESENCE NOT SPECIFIED: ICD-10-CM

## 2018-05-21 PROBLEM — R11.2 INTRACTABLE VOMITING WITH NAUSEA: Status: RESOLVED | Noted: 2017-12-10 | Resolved: 2018-05-21

## 2018-05-21 PROBLEM — M81.0 OSTEOPOROSIS: Chronic | Status: ACTIVE | Noted: 2017-04-20

## 2018-05-21 PROCEDURE — 90732 PPSV23 VACC 2 YRS+ SUBQ/IM: CPT | Performed by: FAMILY MEDICINE

## 2018-05-21 PROCEDURE — G0009 ADMIN PNEUMOCOCCAL VACCINE: HCPCS | Performed by: FAMILY MEDICINE

## 2018-05-21 PROCEDURE — 99214 OFFICE O/P EST MOD 30 MIN: CPT | Performed by: FAMILY MEDICINE

## 2018-05-21 RX ORDER — SUCRALFATE 1 G/1
1 TABLET ORAL 4 TIMES DAILY PRN
Qty: 120 TABLET | Refills: 1 | Status: SHIPPED | OUTPATIENT
Start: 2018-05-21 | End: 2018-09-12

## 2018-05-21 RX ORDER — GABAPENTIN 400 MG/1
CAPSULE ORAL
Qty: 270 CAPSULE | Refills: 2 | Status: SHIPPED | OUTPATIENT
Start: 2018-05-21 | End: 2018-12-27 | Stop reason: SDUPTHER

## 2018-05-21 NOTE — PROGRESS NOTES
Subjective   Mary Anne Wolf is a 72 y.o. female.     History of Present Illness   follow-up from other primary care.  Carries diagnoses hypertension lupus seizure disorder tobacco abuse chronic reflux.  Also persistent neuropathy related to lupus.  Currently is being seen by specialist in [place] for her lupus.  Is on Plaquenil for same.  Continue have burning of the feet and needs adjustment in gabapentin.  Also is behind on mammograms.  Also needs second and final pneumonia vaccine.  Unfortunately continues to smoke quarter pack cigarettes a day.  Has had an eye exam and colon exam.    The following portions of the patient's history were reviewed and updated as appropriate: allergies, current medications, past family history, past medical history, past social history, past surgical history and problem list.    Review of Systems   Constitutional: Negative for activity change, appetite change, fatigue and unexpected weight change.   HENT: Negative for trouble swallowing and voice change.    Eyes: Negative for redness and visual disturbance.   Respiratory: Negative for cough and wheezing.    Cardiovascular: Negative for chest pain and palpitations.   Gastrointestinal: Positive for abdominal pain. Negative for constipation, diarrhea, nausea and vomiting.   Genitourinary: Negative for urgency.   Musculoskeletal: Negative for joint swelling.   Neurological: Positive for numbness. Negative for syncope and headaches.   Hematological: Negative for adenopathy.   Psychiatric/Behavioral: Negative for sleep disturbance.       Objective   Physical Exam   Constitutional: She is oriented to person, place, and time. She appears well-developed.   HENT:   Head: Normocephalic.   Nose: Nose normal.   Eyes: Pupils are equal, round, and reactive to light.   Neck: Normal range of motion. No thyromegaly present.   Cardiovascular: Normal rate, regular rhythm, normal heart sounds and intact distal pulses.  Exam reveals no gallop and  no friction rub.    No murmur heard.  Pulmonary/Chest: Breath sounds normal.   Abdominal: Soft. She exhibits no distension and no mass. There is no tenderness.   Musculoskeletal: Normal range of motion.   No peripheral edema 2+ pulses no skin breakdown no rashes   Neurological: She is alert and oriented to person, place, and time. She has normal strength and normal reflexes. A sensory deficit is present. No cranial nerve deficit. She displays a negative Romberg sign.   Reflex Scores:       Patellar reflexes are 2+ on the right side and 2+ on the left side.  Skin: Skin is warm and dry.   Psychiatric: She has a normal mood and affect. Her behavior is normal. Judgment and thought content normal.       Assessment/Plan   Mary Anne was seen today for establish care.    Diagnoses and all orders for this visit:    Essential hypertension    Tobacco use    Need for vaccination  -     Pneumococcal Polysaccharide Vaccine 23-Valent Greater Than or Equal To 3yo Subcutaneous / IM    Visit for screening mammogram  -     Mammo Screening Digital Tomosynthesis Bilateral With CAD    Systemic lupus erythematosus, unspecified SLE type, unspecified organ involvement status    Neuropathy  -     gabapentin (NEURONTIN) 400 MG capsule; 1 tid    Seizure disorder    Gastroesophageal reflux disease, esophagitis presence not specified  -     sucralfate (CARAFATE) 1 g tablet; Take 1 tablet by mouth 4 (Four) Times a Day As Needed (stomach pain, reflux).       on stopping smoking.  3-10m     Counseled summer hydration following up with all subspecialist above orders is noted.  Recheck 6 months betime for flu vaccine etc.

## 2018-06-19 ENCOUNTER — OFFICE VISIT (OUTPATIENT)
Dept: OTOLARYNGOLOGY | Facility: CLINIC | Age: 73
End: 2018-06-19

## 2018-06-19 VITALS — OXYGEN SATURATION: 91 % | HEIGHT: 62 IN | BODY MASS INDEX: 18.4 KG/M2 | WEIGHT: 100 LBS

## 2018-06-19 DIAGNOSIS — D48.5 NEOPLASM OF UNCERTAIN BEHAVIOR OF SKIN: Primary | ICD-10-CM

## 2018-06-19 PROCEDURE — 11100 PR BIOPSY OF SKIN LESION: CPT | Performed by: OTOLARYNGOLOGY

## 2018-06-19 PROCEDURE — 88313 SPECIAL STAINS GROUP 2: CPT | Performed by: OTOLARYNGOLOGY

## 2018-06-19 PROCEDURE — 99203 OFFICE O/P NEW LOW 30 MIN: CPT | Performed by: OTOLARYNGOLOGY

## 2018-06-19 PROCEDURE — 88313 SPECIAL STAINS GROUP 2: CPT

## 2018-06-19 PROCEDURE — 88305 TISSUE EXAM BY PATHOLOGIST: CPT | Performed by: PATHOLOGY

## 2018-06-19 PROCEDURE — 88323 CONSLTJ&REPRT MATRL PREP SLD: CPT

## 2018-06-19 PROCEDURE — 88305 TISSUE EXAM BY PATHOLOGIST: CPT | Performed by: OTOLARYNGOLOGY

## 2018-06-19 PROCEDURE — 88380 MICRODISSECTION LASER: CPT

## 2018-06-19 PROCEDURE — 80500 TISSUE PATHOLOGY EXAM: CPT | Performed by: PATHOLOGY

## 2018-06-19 PROCEDURE — 88313 SPECIAL STAINS GROUP 2: CPT | Performed by: PATHOLOGY

## 2018-06-19 NOTE — PROGRESS NOTES
"Subjective   Mary Anne Wolf is a 73 y.o. female.   Consultation is requested by Dr. Gay  History of Present Illness   Patient states she has a \"sore on my head\".  Says it's been there for greater than 6 months.  Is not significantly painful.  Doesn't bleed.  She states it is just \"sore and irritated\".  She does not recall any particular inciting event that brought the lesion on.  No history of cutaneous carcinomas.  Does have a history of lupus.  She thinks it may be enlarging in size.      The following portions of the patient's history were reviewed and updated as appropriate: allergies, current medications, past family history, past medical history, past social history, past surgical history and problem list.      Mary Anne Wolf reports that she has been smoking Cigarettes.  She has been smoking about 0.25 packs per day. She has never used smokeless tobacco. She reports that she does not drink alcohol or use drugs.  Patient is a tobacco user and has been counseled for use of tobacco products    Family History   Problem Relation Age of Onset   • Lupus Mother    • Rheum arthritis Mother    • Lupus Sister        Allergies   Allergen Reactions   • Milk-Related Compounds GI Intolerance         Current Outpatient Prescriptions:   •  amLODIPine (NORVASC) 2.5 MG tablet, Take 1 tablet by mouth Daily., Disp: 30 tablet, Rfl: 11  •  cetirizine (zyrTEC) 10 MG tablet, Take 1 tablet by mouth Daily., Disp: 30 tablet, Rfl: 11  •  Cyanocobalamin (VITAMIN B-12 ER) 1500 MCG tablet controlled-release, Take 1 tablet by mouth Daily., Disp: , Rfl:   •  denosumab (PROLIA) 60 MG/ML solution syringe, Inject 60 mg under the skin 1 (One) Time., Disp: , Rfl:   •  divalproex (DEPAKOTE) 500 MG DR tablet, Take 1 tablet by mouth Daily., Disp: 30 tablet, Rfl: 11  •  fluticasone (FLONASE) 50 MCG/ACT nasal spray, 2 sprays into each nostril Daily., Disp: 1 bottle, Rfl: 11  •  gabapentin (NEURONTIN) 400 MG capsule, 1 tid, Disp: 270 capsule, " Rfl: 2  •  hydroxychloroquine (PLAQUENIL) 200 MG tablet, Take 1 tablet by mouth Daily., Disp: 30 tablet, Rfl: 2  •  meclizine (ANTIVERT) 25 MG tablet, Take 1 tablet by mouth 3 (Three) Times a Day As Needed for dizziness., Disp: 30 tablet, Rfl: 5  •  sucralfate (CARAFATE) 1 g tablet, Take 1 tablet by mouth 4 (Four) Times a Day As Needed (stomach pain, reflux)., Disp: 120 tablet, Rfl: 1    Past Medical History:   Diagnosis Date   • Acute posthemorrhagic anemia    • Backache     LLE radiculopathy   • Cigarette smoker    • Discoid lupus erythematosus    • Diverticular disease of colon     incomplete colonoscopy to Hepatic flexure.  Rec ACBE, pt. considering   • Dizziness and giddiness    • Dysphagia    • Esophagitis     grade III   • Essential (primary) hypertension    • Essential hypertension    • Essential hypertension    • Gastroesophageal reflux disease     rec pt try RX prilosec   • H/O screening mammography    • Localization-related (focal) (partial) symptomatic epilepsy and epileptic syndromes with complex partial seizures, intractable, without status epilepticus    • Lupus    • Neuropathy    • Neuropathy    • Nicotine dependence    • Osteopenia    • Periumbilical pain    • Seizure disorder    • Seizure disorder     history of complex partial seizure   • Stricture of esophagus    • Systemic lupus erythematosus    • Systemic lupus erythematosus    • Vertigo    • Vertigo          Review of Systems   Musculoskeletal: Positive for arthralgias and back pain.   Skin:        Hair loss   Neurological: Positive for weakness.   All other systems reviewed and are negative.          Objective   Physical Exam  General: Well-developed well-nourished female in no acute distress.  Alert and oriented ×3. Head: Normocephalic. Face: Symmetrical strength and appearance. PERRL. EOMI. Voice:Strong. Speech:Fluent  Ears: External ears no deformity, canals no discharge, tympanic membranes intact clear and mobile bilaterally.  Nose: Nares  show no discharge mass polyp or purulence.  Boggy mucosa is present.  No gross external deformity.  Septum: Midline  Oral cavity: Lips and gums without lesions.  Tongue and floor of mouth without lesions.  Parotid and submandibular ducts unobstructed.  No mucosal lesions on the buccal mucosa or vestibule of the mouth.  Pharynx: No erythema exudate mass or ulcer  Neck: No lymphadenopathy.  No thyromegaly.  Trachea and larynx midline.  No masses in the parotid or submandibular glands.  Skin: On the occipital scalp there is a 1.5 x 1.1 cm slightly raised and slightly nodular skin lesion.  It is not fluctuant and there is no crusting.  It is lightly pigmented with no erythema.        Assessment/Plan   Mary Anne was seen today for skin lesion.    Diagnoses and all orders for this visit:    Neoplasm of uncertain behavior of skin  -     Tissue Pathology Exam; Future  -     Tissue Pathology Exam    Plan: Recommended a punch biopsy of the lesion to establish a definitive diagnosis.  I explained the nature of this procedure to her including risks of bleeding, infection, poor healing, nondiagnostic result, and possible need for further treatment depending on pathology.  The benefit would be establishing a definitive diagnosis and the alternative would be observation.  I would not want to consider excision unless it is a definitive malignancy because closure could require a skin graft.  Patient voices understanding and agreement.  Please refer to procedure note below.  Patient to call for biopsy results and further recommendations when those are available    My thanks to  for this consultation      Her seizure note    Preop diagnosis: Neoplasm, skin, uncertain behavior, scalp    Postop diagnosis: Same    Procedure: 3 mm punch biopsy of scalp lesion    Surgeon: Dr. Thurston    Anesthesia: 1% Xylocaine with epinephrine    Description of procedure: After confirming informed consent is documented above the skin of the lesion and  surrounding skin of the occipital scalp were cleansed with alcohol and infiltrated 1% Xylocaine with epinephrine.  A 3 mm punch was used to obtain a biopsy from the abnormal lesion.  This was sent to pathology for permanent section.  The wound was closed with a single 5-0 Monocryl suture and bacitracin was applied.  Patient tolerated procedure well.

## 2018-06-21 ENCOUNTER — TELEPHONE (OUTPATIENT)
Dept: OTOLARYNGOLOGY | Facility: CLINIC | Age: 73
End: 2018-06-21

## 2018-06-21 NOTE — TELEPHONE ENCOUNTER
----- Message from Miguel DANIKA Cortez sent at 6/21/2018 12:59 PM CDT -----  Contact: [phone]  Mary Anne Wolf called and said that you wanted her to call you today for her test results from her Tuesday, 06/19/18 appointment.      Mary Anne Wolf [phone]    I spoke with Dr. Camargo earlier about this case and he states that he has sent the specimen to the [facility] for further evaluation but he is sure that it he is benign and not cutaneous carcinoma so I shared that fact with the patient.  I will call her back when the final report from the [facility] is available.

## 2018-07-09 ENCOUNTER — DOCUMENTATION (OUTPATIENT)
Dept: OTOLARYNGOLOGY | Facility: CLINIC | Age: 73
End: 2018-07-09

## 2018-07-09 LAB
LAB AP CASE REPORT: NORMAL
LAB AP DIAGNOSIS COMMENT: NORMAL
LAB AP INTRADEPARTMENTAL CONSULT: NORMAL
LAB AP SPECIAL STAINS: NORMAL
PATH REPORT.FINAL DX SPEC: NORMAL
PATH REPORT.GROSS SPEC: NORMAL

## 2018-07-09 NOTE — PROGRESS NOTES
Pathology result on biopsy of the skin lesion of the scalp return from [facility].  Diagnosis is nodular amyloidosis AL (lambda) type.  Informed patient of the benign nature of the results.  Would not recommend excision of the residual lesion.  Advised her to discuss this diagnosis both with Dr. Ruby and Dr. Gay.  She may see me again as needed.

## 2018-09-12 ENCOUNTER — OFFICE VISIT (OUTPATIENT)
Dept: FAMILY MEDICINE CLINIC | Facility: CLINIC | Age: 73
End: 2018-09-12

## 2018-09-12 ENCOUNTER — APPOINTMENT (OUTPATIENT)
Dept: LAB | Facility: HOSPITAL | Age: 73
End: 2018-09-12

## 2018-09-12 VITALS
SYSTOLIC BLOOD PRESSURE: 114 MMHG | HEIGHT: 62 IN | BODY MASS INDEX: 18.07 KG/M2 | DIASTOLIC BLOOD PRESSURE: 76 MMHG | WEIGHT: 98.2 LBS

## 2018-09-12 DIAGNOSIS — M32.9 SYSTEMIC LUPUS ERYTHEMATOSUS, UNSPECIFIED SLE TYPE, UNSPECIFIED ORGAN INVOLVEMENT STATUS (HCC): Chronic | ICD-10-CM

## 2018-09-12 DIAGNOSIS — Z12.31 VISIT FOR SCREENING MAMMOGRAM: ICD-10-CM

## 2018-09-12 DIAGNOSIS — I10 ESSENTIAL HYPERTENSION: Primary | Chronic | ICD-10-CM

## 2018-09-12 DIAGNOSIS — R63.4 WEIGHT LOSS: ICD-10-CM

## 2018-09-12 DIAGNOSIS — Z23 NEED FOR VACCINATION: ICD-10-CM

## 2018-09-12 DIAGNOSIS — Z72.0 TOBACCO USE: Chronic | ICD-10-CM

## 2018-09-12 DIAGNOSIS — G62.9 NEUROPATHY: Chronic | ICD-10-CM

## 2018-09-12 DIAGNOSIS — R13.19 ESOPHAGEAL DYSPHAGIA: ICD-10-CM

## 2018-09-12 PROBLEM — K21.9 GASTROESOPHAGEAL REFLUX DISEASE: Chronic | Status: ACTIVE | Noted: 2018-05-21

## 2018-09-12 LAB
ALBUMIN SERPL-MCNC: 3.8 G/DL (ref 3.4–4.8)
ALBUMIN/GLOB SERPL: 1 G/DL (ref 1.1–1.8)
ALP SERPL-CCNC: 54 U/L (ref 38–126)
ALT SERPL W P-5'-P-CCNC: 15 U/L (ref 9–52)
ANION GAP SERPL CALCULATED.3IONS-SCNC: 8 MMOL/L (ref 5–15)
AST SERPL-CCNC: 26 U/L (ref 14–36)
BILIRUB SERPL-MCNC: 0.4 MG/DL (ref 0.2–1.3)
BUN BLD-MCNC: 14 MG/DL (ref 7–21)
BUN/CREAT SERPL: 15.1 (ref 7–25)
CALCIUM SPEC-SCNC: 10.1 MG/DL (ref 8.4–10.2)
CHLORIDE SERPL-SCNC: 103 MMOL/L (ref 95–110)
CO2 SERPL-SCNC: 29 MMOL/L (ref 22–31)
CREAT BLD-MCNC: 0.93 MG/DL (ref 0.5–1)
GFR SERPL CREATININE-BSD FRML MDRD: 71 ML/MIN/1.73 (ref 39–90)
GLOBULIN UR ELPH-MCNC: 3.8 GM/DL (ref 2.3–3.5)
GLUCOSE BLD-MCNC: 75 MG/DL (ref 60–100)
MAGNESIUM SERPL-MCNC: 2.1 MG/DL (ref 1.6–2.3)
POTASSIUM BLD-SCNC: 4.1 MMOL/L (ref 3.5–5.1)
PROT SERPL-MCNC: 7.6 G/DL (ref 6.3–8.6)
SODIUM BLD-SCNC: 140 MMOL/L (ref 137–145)

## 2018-09-12 PROCEDURE — 99214 OFFICE O/P EST MOD 30 MIN: CPT | Performed by: FAMILY MEDICINE

## 2018-09-12 PROCEDURE — 83735 ASSAY OF MAGNESIUM: CPT | Performed by: FAMILY MEDICINE

## 2018-09-12 PROCEDURE — 90662 IIV NO PRSV INCREASED AG IM: CPT | Performed by: FAMILY MEDICINE

## 2018-09-12 PROCEDURE — G0008 ADMIN INFLUENZA VIRUS VAC: HCPCS | Performed by: FAMILY MEDICINE

## 2018-09-12 PROCEDURE — 80053 COMPREHEN METABOLIC PANEL: CPT | Performed by: FAMILY MEDICINE

## 2018-09-12 PROCEDURE — 36415 COLL VENOUS BLD VENIPUNCTURE: CPT | Performed by: FAMILY MEDICINE

## 2018-09-12 RX ORDER — ESOMEPRAZOLE MAGNESIUM 40 MG/1
40 CAPSULE, DELAYED RELEASE ORAL
COMMUNITY
End: 2019-03-12

## 2018-09-12 RX ORDER — PYRAZINAMIDE 500 MG/1
TABLET ORAL
Qty: 15 TABLET | Refills: 1 | Status: SHIPPED | OUTPATIENT
Start: 2018-09-12 | End: 2018-11-29

## 2018-09-12 NOTE — PROGRESS NOTES
Subjective   Mary Anne Wolf is a 73 y.o. female.     History of Present Illness   reevaluation hypertension tobacco abuse seizure disorder history of lupus weight loss.  Interim continued to lose weight loss 2 pounds.  States is having increased dysphasia distal esophageal.  Symptoms of esophageal stricture or other.  Unfortunately continues to smoke.  Also requesting refill on rare use of Tylenol 3 for lupus related headache.  Has had an eye exam.  Flu vaccine today.  Does visit rheumatology every 6 months.    The following portions of the patient's history were reviewed and updated as appropriate: allergies, current medications, past family history, past medical history, past social history, past surgical history and problem list.    Review of Systems   Constitutional: Negative for activity change, appetite change, fatigue and unexpected weight change.   HENT: Negative for trouble swallowing and voice change.    Eyes: Negative for redness and visual disturbance.   Respiratory: Negative for cough and wheezing.    Cardiovascular: Negative for chest pain and palpitations.   Gastrointestinal: Negative for abdominal pain, constipation, diarrhea, nausea and vomiting.   Genitourinary: Negative for urgency.   Musculoskeletal: Positive for arthralgias. Negative for joint swelling.   Neurological: Negative for syncope and headaches.   Hematological: Negative for adenopathy.   Psychiatric/Behavioral: Negative for sleep disturbance.       Objective   Physical Exam   Constitutional: She is oriented to person, place, and time. She appears well-developed.   HENT:   Head: Normocephalic.   Nose: Nose normal.   Eyes: Pupils are equal, round, and reactive to light.   Neck: Normal range of motion. No thyromegaly present.   Cardiovascular: Normal rate, regular rhythm, normal heart sounds and intact distal pulses.  Exam reveals no gallop and no friction rub.    No murmur heard.  Pulmonary/Chest: Breath sounds normal.   Abdominal:  Soft. She exhibits no distension and no mass. There is no tenderness.   Musculoskeletal: Normal range of motion. She exhibits tenderness (Get up and go 3 seconds).   Neurological: She is alert and oriented to person, place, and time. She has normal reflexes.   Skin: Skin is warm and dry.   Psychiatric: She has a normal mood and affect. Her behavior is normal. Thought content normal.       Assessment/Plan   Mary Anne was seen today for follow-up.    Diagnoses and all orders for this visit:    Essential hypertension  -     Comprehensive Metabolic Panel  -     Magnesium    Neuropathy    Tobacco use  -     Ambulatory Referral to General Surgery    Visit for screening mammogram  -     Mammo Screening Digital Tomosynthesis Bilateral With CAD    Need for vaccination  -     Fluzone High Dose =>65Years    Esophageal dysphagia  -     Ambulatory Referral to General Surgery    Weight loss  -     Ambulatory Referral to General Surgery    Systemic lupus erythematosus, unspecified SLE type, unspecified organ involvement status (CMS/[facility])  -     acetaminophen-codeine (TYLENOL/CODEINE #3) 300-30 MG per tablet; 1 q 8hr prn headache      Needs evaluation of upper GI tract.  Again counseled on same.  Arrangements for same.   on stopping smoking.  3-10m     Medications ordered recheck routinely 6 months or based on the above

## 2018-10-02 ENCOUNTER — CONSULT (OUTPATIENT)
Dept: SURGERY | Facility: CLINIC | Age: 73
End: 2018-10-02

## 2018-10-02 VITALS
WEIGHT: 99 LBS | HEIGHT: 62 IN | SYSTOLIC BLOOD PRESSURE: 130 MMHG | HEART RATE: 71 BPM | BODY MASS INDEX: 18.22 KG/M2 | TEMPERATURE: 97.9 F | DIASTOLIC BLOOD PRESSURE: 62 MMHG

## 2018-10-02 DIAGNOSIS — R13.10 DYSPHAGIA, UNSPECIFIED TYPE: Primary | ICD-10-CM

## 2018-10-02 PROCEDURE — 99202 OFFICE O/P NEW SF 15 MIN: CPT | Performed by: SURGERY

## 2018-10-02 RX ORDER — DEXTROSE AND SODIUM CHLORIDE 5; .45 G/100ML; G/100ML
100 INJECTION, SOLUTION INTRAVENOUS CONTINUOUS
Status: CANCELLED | OUTPATIENT
Start: 2018-10-05

## 2018-10-02 RX ORDER — PREDNISONE 10 MG/1
10 TABLET ORAL DAILY
COMMUNITY
Start: 2018-07-12 | End: 2019-03-12

## 2018-10-02 NOTE — PATIENT INSTRUCTIONS
MyPlate from [employer]  The general, healthful diet is based on the 2010 Dietary Guidelines for Americans. The amount of food you need to eat from each food group depends on your age, sex, and level of physical activity and can be individualized by a dietitian. Go to ChooseMyPlate.gov for more information.  What do I need to know about the MyPlate plan?  · Enjoy your food, but eat less.  · Avoid oversized portions.  ? ½ of your plate should include fruits and vegetables.  ? ¼ of your plate should be grains.  ? ¼ of your plate should be protein.  Grains  · Make at least half of your grains whole grains.  · For a 2,000 calorie daily food plan, eat 6 oz every day.  · 1 oz is about 1 slice bread, 1 cup cereal, or ½ cup cooked rice, cereal, or pasta.  Vegetables  · Make half your plate fruits and vegetables.  · For a 2,000 calorie daily food plan, eat 2½ cups every day.  · 1 cup is about 1 cup raw or cooked vegetables or vegetable juice or 2 cups raw leafy greens.  Fruits  · Make half your plate fruits and vegetables.  · For a 2,000 calorie daily food plan, eat 2 cups every day.  · 1 cup is about 1 cup fruit or 100% fruit juice or ½ cup dried fruit.  Protein  · For a 2,000 calorie daily food plan, eat 5½ oz every day.  · 1 oz is about 1 oz meat, poultry, or fish, ¼ cup cooked beans, 1 egg, 1 Tbsp peanut butter, or ½ oz nuts or seeds.  Dairy  · Switch to fat-free or low-fat (1%) milk.  · For a 2,000 calorie daily food plan, eat 3 cups every day.  · 1 cup is about 1 cup milk or yogurt or soy milk (soy beverage), 1½ oz natural cheese, or 2 oz processed cheese.  Fats, Oils, and Empty Calories  · Only small amounts of oils are recommended.  · Empty calories are calories from solid fats or added sugars.  · Compare sodium in foods like soup, bread, and frozen meals. Choose the foods with lower numbers.  · Drink water instead of sugary drinks.  What foods can I eat?  Grains  Whole grains such as whole wheat, quinoa, millet, and  bulgur. Bread, rolls, and pasta made from whole grains. Brown or wild rice. Hot or cold cereals made from whole grains and without added sugar.  Vegetables  All fresh vegetables, especially fresh red, dark green, or orange vegetables. Peas and beans. Low-sodium frozen or canned vegetables prepared without added salt. Low-sodium vegetable juices.  Fruits  All fresh, frozen, and dried fruits. Canned fruit packed in water or fruit juice without added sugar. Fruit juices without added sugar.  Meats and Other Protein Sources  Boiled, baked, or grilled lean meat trimmed of fat. Skinless poultry. Fresh seafood and shellfish. Canned seafood packed in water. Unsalted nuts and unsalted nut butters. Tofu. Dried beans and pea. Eggs.  Dairy  Low-fat or fat-free milk, yogurt, and cheeses.  Sweets and Desserts  Frozen desserts made from low-fat milk.  Fats and Oils  Olive, peanut, and canola oils and margarine. Salad dressing and mayonnaise made from these oils.  Other  Soups and casseroles made from allowed ingredients and without added fat or salt.  The items listed above may not be a complete list of recommended foods or beverages. Contact your dietitian for more options.  What foods are not recommended?  Grains  Sweetened, low-fiber cereals. Packaged baked goods. Snack crackers and chips. Cheese crackers, butter crackers, and biscuits. Frozen waffles, sweet breads, doughnuts, pastries, packaged baking mixes, pancakes, cakes, and cookies.  Vegetables  Regular canned or frozen vegetables or vegetables prepared with salt. Canned tomatoes. Canned tomato sauce. Fried vegetables. Vegetables in cream sauce or cheese sauce.  Fruits  Fruits packed in syrup or made with added sugar.  Meats and Other Protein Sources  Marbled or fatty meats such as ribs. Poultry with skin. Fried meats, poultry, eggs, or fish. Sausages, hot dogs, and deli meats such as pastrami, bologna, or salami.  Dairy  Whole milk, cream, cheeses made from whole milk,  sour cream. Ice cream or yogurt made from whole milk or with added sugar.  Beverages  For adults, no more than one alcoholic drink per day. Regular soft drinks or other sugary beverages. Juice drinks.  Sweets and Desserts  Sugary or fatty desserts, candy, and other sweets.  Fats and Oils  Solid shortening or partially hydrogenated oils. Solid margarine. Margarine that contains trans fats. Butter.  The items listed above may not be a complete list of foods and beverages to avoid. Contact your dietitian for more information.  This information is not intended to replace advice given to you by your health care provider. Make sure you discuss any questions you have with your health care provider.  Document Released: 01/06/2009 Document Revised: 05/25/2017 Document Reviewed: 11/26/2014  [employer] Interactive Patient Education © 2018 Elsevier Inc.    Quit Now [place]  1-800-QUIT-NOW  https://[place].quitlogix.org/en-[place]/  Steps to Quit Smoking  Smoking tobacco can be harmful to your health and can affect almost every organ in your body. Smoking puts you, and those around you, at risk for developing many serious chronic diseases. Quitting smoking is difficult, but it is one of the best things that you can do for your health. It is never too late to quit.  What are the benefits of quitting smoking?  When you quit smoking, you lower your risk of developing serious diseases and conditions, such as:  · Lung cancer or lung disease, such as COPD.  · Heart disease.  · Stroke.  · Heart attack.  · Infertility.  · Osteoporosis and bone fractures.  Additionally, symptoms such as coughing, wheezing, and shortness of breath may get better when you quit. You may also find that you get sick less often because your body is stronger at fighting off colds and infections. If you are pregnant, quitting smoking can help to reduce your chances of having a baby of low birth weight.  How do I get ready to quit?  When you decide to quit smoking,  create a plan to make sure that you are successful. Before you quit:  · Pick a date to quit. Set a date within the next two weeks to give you time to prepare.  · Write down the reasons why you are quitting. Keep this list in places where you will see it often, such as on your bathroom mirror or in your car or wallet.  · Identify the people, places, things, and activities that make you want to smoke (triggers) and avoid them. Make sure to take these actions:  ¨ Throw away all cigarettes at home, at work, and in your car.  ¨ Throw away smoking accessories, such as ashtrays and lighters.  ¨ Clean your car and make sure to empty the ashtray.  ¨ Clean your home, including curtains and carpets.  · Tell your family, friends, and coworkers that you are quitting. Support from your loved ones can make quitting easier.  · Talk with your health care provider about your options for quitting smoking.  · Find out what treatment options are covered by your health insurance.  What strategies can I use to quit smoking?  Talk with your healthcare provider about different strategies to quit smoking. Some strategies include:  · Quitting smoking altogether instead of gradually lessening how much you smoke over a period of time. Research shows that quitting “cold turkey” is more successful than gradually quitting.  · Attending in-person counseling to help you build problem-solving skills. You are more likely to have success in quitting if you attend several counseling sessions. Even short sessions of 10 minutes can be effective.  · Finding resources and support systems that can help you to quit smoking and remain smoke-free after you quit. These resources are most helpful when you use them often. They can include:  ¨ Online chats with a counselor.  ¨ Telephone quitlines.  ¨ Printed self-help materials.  ¨ Support groups or group counseling.  ¨ Text messaging programs.  ¨ Mobile phone applications.  · Taking medicines to help you quit  smoking. (If you are pregnant or breastfeeding, talk with your health care provider first.) Some medicines contain nicotine and some do not. Both types of medicines help with cravings, but the medicines that include nicotine help to relieve withdrawal symptoms. Your health care provider may recommend:  ¨ Nicotine patches, gum, or lozenges.  ¨ Nicotine inhalers or sprays.  ¨ Non-nicotine medicine that is taken by mouth.  Talk with your health care provider about combining strategies, such as taking medicines while you are also receiving in-person counseling. Using these two strategies together makes you more likely to succeed in quitting than if you used either strategy on its own.  If you are pregnant or breastfeeding, talk with your health care provider about finding counseling or other support strategies to quit smoking. Do not take medicine to help you quit smoking unless told to do so by your health care provider.  What things can I do to make it easier to quit?  Quitting smoking might feel overwhelming at first, but there is a lot that you can do to make it easier. Take these important actions:  · Reach out to your family and friends and ask that they support and encourage you during this time. Call telephone quitlines, reach out to support groups, or work with a counselor for support.  · Ask people who smoke to avoid smoking around you.  · Avoid places that trigger you to smoke, such as bars, parties, or smoke-break areas at work.  · Spend time around people who do not smoke.  · Lessen stress in your life, because stress can be a smoking trigger for some people. To lessen stress, try:  ¨ Exercising regularly.  ¨ Deep-breathing exercises.  ¨ Yoga.  ¨ Meditating.  ¨ Performing a body scan. This involves closing your eyes, scanning your body from head to toe, and noticing which parts of your body are particularly tense. Purposefully relax the muscles in those areas.  · Download or purchase mobile phone or tablet  apps (applications) that can help you stick to your quit plan by providing reminders, tips, and encouragement. There are many free apps, such as QuitGuide from the CDC (Centers for Disease Control and Prevention). You can find other support for quitting smoking (smoking cessation) through smokefree.gov and other websites.  How will I feel when I quit smoking?  Within the first 24 hours of quitting smoking, you may start to feel some withdrawal symptoms. These symptoms are usually most noticeable 2-3 days after quitting, but they usually do not last beyond 2-3 weeks. Changes or symptoms that you might experience include:  · Mood swings.  · Restlessness, anxiety, or irritation.  · Difficulty concentrating.  · Dizziness.  · Strong cravings for sugary foods in addition to nicotine.  · Mild weight gain.  · Constipation.  · Nausea.  · Coughing or a sore throat.  · Changes in how your medicines work in your body.  · A depressed mood.  · Difficulty sleeping (insomnia).  After the first 2-3 weeks of quitting, you may start to notice more positive results, such as:  · Improved sense of smell and taste.  · Decreased coughing and sore throat.  · Slower heart rate.  · Lower blood pressure.  · Clearer skin.  · The ability to breathe more easily.  · Fewer sick days.  Quitting smoking is very challenging for most people. Do not get discouraged if you are not successful the first time. Some people need to make many attempts to quit before they achieve long-term success. Do your best to stick to your quit plan, and talk with your health care provider if you have any questions or concerns.  This information is not intended to replace advice given to you by your health care provider. Make sure you discuss any questions you have with your health care provider.  Document Released: 12/12/2002 Document Revised: 08/15/2017 Document Reviewed: 05/03/2016  Elsevier Interactive Patient Education © 2017 Elsevier Inc.

## 2018-10-02 NOTE — PROGRESS NOTES
Subjective   Mary Anne Wolf is a 73 y.o. female     History of Present Illness Dr. Ruby for evaluation and treatment of dysphagia.  Patient has dysphagia to solids only.  Denies dysphagia to pills or to liquids.  Patient has a long history of reflux disease and she takes Nexium daily before lunch meal.  I would recommend she take her medication before breakfast in the morning and she'll make that adjustment.  Patient never had an EGD and upper GI study or any other evaluation.  Patient has been told she had any type of hiatal hernia.         Review of Systems   Constitutional: Positive for unexpected weight change.        Loss   Eyes: Negative.    Respiratory: Negative.    Cardiovascular: Negative.    Gastrointestinal:        Heartburn,Dyshpagia   Endocrine: Negative.    Genitourinary: Negative.    Musculoskeletal: Negative.    Skin: Negative.    Allergic/Immunologic: Negative.    Neurological: Positive for dizziness and headaches.   Hematological: Negative.    Psychiatric/Behavioral: Negative.    All other systems reviewed and are negative.    Past Medical History:   Diagnosis Date   • Acute posthemorrhagic anemia    • Backache     LLE radiculopathy   • Cigarette smoker    • Discoid lupus erythematosus    • Diverticular disease of colon     incomplete colonoscopy to Hepatic flexure.  Rec ACBE, pt. considering   • Dizziness and giddiness    • Dysphagia    • Esophagitis     grade III   • Essential (primary) hypertension    • Essential hypertension    • Essential hypertension    • Gastroesophageal reflux disease     rec pt try RX prilosec   • H/O screening mammography    • Localization-related (focal) (partial) symptomatic epilepsy and epileptic syndromes with complex partial seizures, intractable, without status epilepticus (CMS/HCC)    • Lupus    • Neuropathy    • Neuropathy    • Nicotine dependence    • Osteopenia    • Periumbilical pain    • Seizure disorder (CMS/HCC)    • Seizure disorder (CMS/HCC)      history of complex partial seizure   • Stricture of esophagus    • Systemic lupus erythematosus (CMS/HCC)    • Systemic lupus erythematosus (CMS/HCC)    • Vertigo    • Vertigo      Past Surgical History:   Procedure Laterality Date   • COLONOSCOPY  08/05/2015   • COLONOSCOPY  07/08/2014    REFUSED BY PATIENT   • COLONOSCOPY  08/05/2015    a diverticulum was found in the sigmoid colon   • ENDOSCOPY  08/05/2015    Esophageal stricture was present. Dilatatin was performed. Esophagitis . Biopsy taken. Normal stomach. Normal duodenum   • INJECTION OF MEDICATION      Rocehin (2)   • UPPER GASTROINTESTINAL ENDOSCOPY  08/05/2015     Family History   Problem Relation Age of Onset   • Lupus Mother    • Rheum arthritis Mother    • Lupus Sister      Social History     Social History   • Marital status:      Spouse name: N/A   • Number of children: N/A   • Years of education: N/A     Occupational History   • Not on file.     Social History Main Topics   • Smoking status: Current Every Day Smoker     Packs/day: 0.25     Types: Cigarettes   • Smokeless tobacco: Never Used      Comment: 2 a day   • Alcohol use No   • Drug use: No   • Sexual activity: Defer     Other Topics Concern   • Not on file     Social History Narrative   • No narrative on file     Allergies   Allergen Reactions   • Milk-Related Compounds GI Intolerance       Home Medications:  Prior to Admission medications    Medication Sig Start Date End Date Taking? Authorizing Provider   acetaminophen-codeine (TYLENOL/CODEINE #3) 300-30 MG per tablet 1 q 8hr prn headache 9/12/18  Yes Errol Ruby MD   amLODIPine (NORVASC) 2.5 MG tablet Take 1 tablet by mouth Daily. 3/2/18  Yes ZuhairogrBess whitmore DO   Cyanocobalamin (VITAMIN B-12 ER) 1500 MCG tablet controlled-release Take 1 tablet by mouth Daily.   Yes ProviderSasha MD   denosumab (PROLIA) 60 MG/ML solution syringe Inject 60 mg under the skin 1 (One) Time.   Yes ProviderSasha MD   divalproex  (DEPAKOTE) 500 MG DR tablet Take 1 tablet by mouth Daily. 11/17/17  Yes Bess Suárez DO   fluticasone (FLONASE) 50 MCG/ACT nasal spray 2 sprays into each nostril Daily. 11/17/17  Yes Bess Suárez,    gabapentin (NEURONTIN) 400 MG capsule 1 tid 5/21/18  Yes Errol Ruby MD   hydroxychloroquine (PLAQUENIL) 200 MG tablet Take 1 tablet by mouth Daily. 2/9/17  Yes Bess Suárez DO   meclizine (ANTIVERT) 25 MG tablet Take 1 tablet by mouth 3 (Three) Times a Day As Needed for dizziness. 2/19/18  Yes Bess Suárez DO   predniSONE (DELTASONE) 10 MG tablet  7/12/18  Yes Sasha Lovell MD   cetirizine (zyrTEC) 10 MG tablet Take 1 tablet by mouth Daily. 11/17/17   Bess Suárez DO   esomeprazole (nexIUM) 40 MG capsule Take 40 mg by mouth Every Morning Before Breakfast.    Provider, MD Sasha       Objective   Physical Exam   Constitutional: She is oriented to person, place, and time. She appears well-developed and well-nourished. No distress.   HENT:   Head: Normocephalic and atraumatic.   Nose: Nose normal.   Eyes: Conjunctivae are normal.   Neck: Normal range of motion. No tracheal deviation present. No thyromegaly present.   Cardiovascular: Normal rate, regular rhythm and normal heart sounds.    No murmur heard.  Pulmonary/Chest: Effort normal and breath sounds normal. No respiratory distress. She has no wheezes. She has no rales. She exhibits no tenderness.   Abdominal: Soft. She exhibits no distension. There is no tenderness. There is no rebound and no guarding. No hernia.   Musculoskeletal: She exhibits no tenderness or deformity.   Neurological: She is alert and oriented to person, place, and time.   Skin: Skin is warm and dry. No rash noted.   Psychiatric: She has a normal mood and affect. Her behavior is normal. Judgment and thought content normal.   Vitals reviewed.  Thin black female in no acute distress    Assessment/Plan  dysphagia likely secondary to her reflux and scarring.   Recommend taking her Nexium before breakfast in the morning.  Would also recommend a barium swallow and then a subsequent EGD with possible dilation.  Fully discussed both procedures with her she clearly understands alternatives risk benefits and wishes to proceed       The encounter diagnosis was Dysphagia, unspecified type.                     This document has been electronically signed by Freeman Cade MD on October 2, 2018 12:57 PM

## 2018-10-04 ENCOUNTER — HOSPITAL ENCOUNTER (OUTPATIENT)
Dept: GENERAL RADIOLOGY | Facility: HOSPITAL | Age: 73
Discharge: HOME | End: 2018-10-04
Admitting: SURGERY

## 2018-10-04 DIAGNOSIS — R13.10 DYSPHAGIA, UNSPECIFIED TYPE: ICD-10-CM

## 2018-10-04 PROCEDURE — 74220 X-RAY XM ESOPHAGUS 1CNTRST: CPT

## 2018-10-04 PROCEDURE — A9270 NON-COVERED ITEM OR SERVICE: HCPCS | Performed by: RADIOLOGY

## 2018-10-04 PROCEDURE — 63710000001 BARIUM SULFATE 96 % RECONSTITUTED SUSPENSION: Performed by: RADIOLOGY

## 2018-10-04 RX ADMIN — BARIUM SULFATE 240 ML: 960 POWDER, FOR SUSPENSION ORAL at 08:38

## 2018-10-05 ENCOUNTER — ANESTHESIA (OUTPATIENT)
Dept: GASTROENTEROLOGY | Facility: HOSPITAL | Age: 73
End: 2018-10-05

## 2018-10-05 ENCOUNTER — ANESTHESIA EVENT (OUTPATIENT)
Dept: GASTROENTEROLOGY | Facility: HOSPITAL | Age: 73
End: 2018-10-05

## 2018-10-05 ENCOUNTER — HOSPITAL ENCOUNTER (OUTPATIENT)
Facility: HOSPITAL | Age: 73
Setting detail: HOSPITAL OUTPATIENT SURGERY
Discharge: HOME | End: 2018-10-05
Attending: SURGERY | Admitting: SURGERY

## 2018-10-05 VITALS
TEMPERATURE: 96.9 F | HEART RATE: 75 BPM | DIASTOLIC BLOOD PRESSURE: 73 MMHG | BODY MASS INDEX: 18.26 KG/M2 | RESPIRATION RATE: 20 BRPM | WEIGHT: 99.25 LBS | OXYGEN SATURATION: 96 % | SYSTOLIC BLOOD PRESSURE: 123 MMHG | HEIGHT: 62 IN

## 2018-10-05 DIAGNOSIS — R13.10 DYSPHAGIA, UNSPECIFIED TYPE: ICD-10-CM

## 2018-10-05 PROCEDURE — G0463 HOSPITAL OUTPT CLINIC VISIT: HCPCS | Performed by: SURGERY

## 2018-10-05 RX ORDER — DEXTROSE AND SODIUM CHLORIDE 5; .45 G/100ML; G/100ML
100 INJECTION, SOLUTION INTRAVENOUS CONTINUOUS
Status: DISCONTINUED | OUTPATIENT
Start: 2018-10-05 | End: 2018-10-08 | Stop reason: HOSPADM

## 2018-10-05 RX ADMIN — DEXTROSE AND SODIUM CHLORIDE 100 ML/HR: 5; 450 INJECTION, SOLUTION INTRAVENOUS at 07:41

## 2018-10-05 NOTE — ANESTHESIA PREPROCEDURE EVALUATION
Anesthesia Evaluation     NPO Solid Status: > 8 hours  NPO Liquid Status: > 8 hours           Airway   Dental      Pulmonary    Cardiovascular     (+) hypertension,       Neuro/Psych  (+) seizures well controlled, dizziness/light headedness,     GI/Hepatic/Renal/Endo    (+)  GERD well controlled,      Musculoskeletal         ROS comment: lupus  Abdominal    Substance History      OB/GYN          Other                        Anesthesia Plan

## 2018-10-05 NOTE — NURSING NOTE
Dr. Cade cancelled pt due to pt upper gi abnormal and pt left without procedure performed and to see Dr. Bliss.

## 2018-10-08 ENCOUNTER — HOSPITAL ENCOUNTER (EMERGENCY)
Facility: HOSPITAL | Age: 73
Discharge: HOME | End: 2018-10-09
Attending: EMERGENCY MEDICINE | Admitting: EMERGENCY MEDICINE

## 2018-10-08 DIAGNOSIS — R13.10 DYSPHAGIA, UNSPECIFIED TYPE: Primary | ICD-10-CM

## 2018-10-08 PROCEDURE — 99284 EMERGENCY DEPT VISIT MOD MDM: CPT

## 2018-10-08 PROCEDURE — 93005 ELECTROCARDIOGRAM TRACING: CPT | Performed by: EMERGENCY MEDICINE

## 2018-10-08 PROCEDURE — 93010 ELECTROCARDIOGRAM REPORT: CPT | Performed by: INTERNAL MEDICINE

## 2018-10-08 PROCEDURE — 93005 ELECTROCARDIOGRAM TRACING: CPT

## 2018-10-09 VITALS
HEART RATE: 75 BPM | TEMPERATURE: 98.8 F | DIASTOLIC BLOOD PRESSURE: 81 MMHG | OXYGEN SATURATION: 94 % | SYSTOLIC BLOOD PRESSURE: 154 MMHG | WEIGHT: 99.25 LBS | HEIGHT: 62 IN | RESPIRATION RATE: 18 BRPM | BODY MASS INDEX: 18.26 KG/M2

## 2018-10-09 RX ORDER — ALUMINA, MAGNESIA, AND SIMETHICONE 2400; 2400; 240 MG/30ML; MG/30ML; MG/30ML
15 SUSPENSION ORAL ONCE
Status: COMPLETED | OUTPATIENT
Start: 2018-10-09 | End: 2018-10-09

## 2018-10-09 RX ORDER — SUCRALFATE ORAL 1 G/10ML
1 SUSPENSION ORAL 4 TIMES DAILY
Qty: 420 ML | Refills: 0 | Status: SHIPPED | OUTPATIENT
Start: 2018-10-09 | End: 2018-11-29

## 2018-10-09 RX ADMIN — ALUMINUM HYDROXIDE, MAGNESIUM HYDROXIDE, AND DIMETHICONE 15 ML: 400; 400; 40 SUSPENSION ORAL at 00:15

## 2018-10-09 RX ADMIN — LIDOCAINE HYDROCHLORIDE 15 ML: 20 SOLUTION ORAL; TOPICAL at 00:15

## 2018-10-09 NOTE — ED NOTES
"Pt vomited. A few minutes later, pt stated she \"feels a little bit better\" since she vomited.     Jammie Sorto  10/08/18 [id number]    "

## 2018-10-09 NOTE — ED PROVIDER NOTES
Subjective   73-year-old female presents the emergency department with burning in her esophagus and difficulty swallowing.  Patient has known esophageal strictures and was scheduled for dilatation of these by Dr. Bliss but the procedure was postponed.  Tonight the patient was eating and she felt something get stock.  It passed and she is able to tolerate fluids by mouth but she has had persistent burning in the esophagus.  It is worsened by any oral intake.  And it's better when she rests.  She has no shortness of breath or other symptoms.            Review of Systems   Constitutional: Negative for chills, fatigue and fever.   HENT: Positive for trouble swallowing. Negative for congestion and sore throat.    Eyes: Negative for visual disturbance.   Respiratory: Negative for cough and shortness of breath.    Cardiovascular: Negative for chest pain and leg swelling.   Gastrointestinal: Negative for abdominal pain, nausea and vomiting.   Genitourinary: Negative for dysuria.   Musculoskeletal: Negative for back pain.   Skin: Negative for rash.   Neurological: Negative for dizziness and weakness.   Psychiatric/Behavioral: Negative for behavioral problems.   All other systems reviewed and are negative.      Past Medical History:   Diagnosis Date   • Acute posthemorrhagic anemia    • Backache     LLE radiculopathy   • Cigarette smoker    • Discoid lupus erythematosus    • Diverticular disease of colon     incomplete colonoscopy to Hepatic flexure.  Rec ACBE, pt. considering   • Dizziness and giddiness    • Dysphagia    • Esophagitis     grade III   • Essential (primary) hypertension    • Essential hypertension    • Essential hypertension    • Gastroesophageal reflux disease     rec pt try RX prilosec   • H/O screening mammography    • Localization-related (focal) (partial) symptomatic epilepsy and epileptic syndromes with complex partial seizures, intractable, without status epilepticus (CMS/HCC)    • Lupus    •  Neuropathy    • Neuropathy    • Nicotine dependence    • Osteopenia    • Periumbilical pain    • Seizure disorder (CMS/HCC)    • Seizure disorder (CMS/HCC)     history of complex partial seizure   • Stricture of esophagus    • Systemic lupus erythematosus (CMS/HCC)    • Systemic lupus erythematosus (CMS/HCC)    • Vertigo    • Vertigo        Allergies   Allergen Reactions   • Milk-Related Compounds GI Intolerance       Past Surgical History:   Procedure Laterality Date   • COLONOSCOPY  08/05/2015   • COLONOSCOPY  07/08/2014    REFUSED BY PATIENT   • COLONOSCOPY  08/05/2015    a diverticulum was found in the sigmoid colon   • ENDOSCOPY  08/05/2015    Esophageal stricture was present. Dilatatin was performed. Esophagitis . Biopsy taken. Normal stomach. Normal duodenum   • INJECTION OF MEDICATION      Rocehin (2)   • UPPER GASTROINTESTINAL ENDOSCOPY  08/05/2015       Family History   Problem Relation Age of Onset   • Lupus Mother    • Rheum arthritis Mother    • Lupus Sister        Social History     Social History   • Marital status:      Social History Main Topics   • Smoking status: Current Every Day Smoker     Packs/day: 0.25     Types: Cigarettes   • Smokeless tobacco: Never Used      Comment: 2 a day   • Alcohol use No   • Drug use: No   • Sexual activity: Defer     Other Topics Concern   • Not on file           Objective   Physical Exam   Constitutional: She is oriented to person, place, and time. She appears well-developed and well-nourished.   HENT:   Head: Normocephalic and atraumatic.   Eyes: Pupils are equal, round, and reactive to light. EOM are normal.   Neck: Normal range of motion. Neck supple.   No midline tenderness   Cardiovascular: Normal rate, regular rhythm, normal heart sounds and intact distal pulses.  Exam reveals no gallop and no friction rub.    No murmur heard.  Pulmonary/Chest: Breath sounds normal. No respiratory distress. She has no wheezes. She has no rales.   No rhonchi    Abdominal: Soft. Bowel sounds are normal. She exhibits no distension. There is no tenderness.   Musculoskeletal: Normal range of motion. She exhibits no tenderness or deformity.   Neurological: She is alert and oriented to person, place, and time. No cranial nerve deficit. She exhibits normal muscle tone. Coordination normal.   Skin: Skin is warm and dry. No rash noted.   Psychiatric: She has a normal mood and affect. Her behavior is normal.       Procedures           ED Course                  MDM  Number of Diagnoses or Management Options  Diagnosis management comments: 12:35 AM patient will be given a GI cocktail and reassessed for symptom resolution.    1:03 AM.  Symptoms have resolved after the GI cocktail.  The patient was discharged home with Carafate.  She will be instructed to follow-up with Dr. Whitehead in 48 hours.    Risk of Complications, Morbidity, and/or Mortality  Presenting problems: moderate  Diagnostic procedures: moderate  Management options: moderate          Final diagnoses:   Dysphagia, unspecified type            Ry Jacobs MD  10/09/18 0104

## 2018-10-09 NOTE — ED NOTES
Pt c/o of chest pain since dinner. Pt has history of extremely bad GERD. Pt currently vomiting in triage.      Jacki Vargas, RN  10/08/18 8342

## 2018-10-26 ENCOUNTER — INFUSION (OUTPATIENT)
Dept: ONCOLOGY | Facility: HOSPITAL | Age: 73
End: 2018-10-26

## 2018-10-26 DIAGNOSIS — M81.0 OSTEOPOROSIS, UNSPECIFIED OSTEOPOROSIS TYPE, UNSPECIFIED PATHOLOGICAL FRACTURE PRESENCE: Primary | ICD-10-CM

## 2018-10-26 PROCEDURE — 96372 THER/PROPH/DIAG INJ SC/IM: CPT | Performed by: NURSE PRACTITIONER

## 2018-10-26 PROCEDURE — 25010000002 DENOSUMAB 60 MG/ML SOLUTION: Performed by: NURSE PRACTITIONER

## 2018-10-26 RX ADMIN — DENOSUMAB 60 MG: 60 INJECTION SUBCUTANEOUS at 10:32

## 2018-11-14 ENCOUNTER — HOSPITAL ENCOUNTER (OUTPATIENT)
Dept: GASTROENTEROLOGY | Facility: HOSPITAL | Age: 73
Discharge: HOME | End: 2018-11-14
Admitting: INTERNAL MEDICINE

## 2018-11-14 PROCEDURE — 91010 ESOPHAGUS MOTILITY STUDY: CPT

## 2018-11-14 NOTE — NURSING NOTE
Time in Room:  0750    B/P:  106/58    Heart Rate:  68     Respirations:  16    O2 Sat %:  97%  Room air    Temp:  97.0    NPO Status:  11/13/2018   @  2100    Pain:  no          If yes, location and VAS:      Patient arrived to the Endoscopy department ambulatory alert and oriented.  Procedure  Explained to patient, patient verbalized understanding.  Consent obtained.     Lubricating  jelly used in right ___ nare.  Probe inserted into right___ nare to _46__ cm.     Patient tolerated well.     Procedure start time:  0807  Procedure stop time:  0818    Study complete, probe removed intact.  Patient tolerated well.      B/P:  138/74    Heart Rate:  68     Respirations:  16    O2 Sat %:   96%  Room air      Patient Discharged to home time:  0824

## 2018-11-30 ENCOUNTER — TELEPHONE (OUTPATIENT)
Dept: FAMILY MEDICINE CLINIC | Facility: CLINIC | Age: 73
End: 2018-11-30

## 2018-11-30 NOTE — TELEPHONE ENCOUNTER
Called and made sure that the patient knew that this appointment was an error and that she is not due until march and I removed the error appointment. ----- Message from Errol Ruby MD sent at 11/30/2018  8:17 AM CST -----  Regarding: karen gayle  Old appt not due till march

## 2018-12-06 ENCOUNTER — ANESTHESIA (OUTPATIENT)
Dept: GASTROENTEROLOGY | Facility: HOSPITAL | Age: 73
End: 2018-12-06

## 2018-12-06 ENCOUNTER — ANESTHESIA EVENT (OUTPATIENT)
Dept: GASTROENTEROLOGY | Facility: HOSPITAL | Age: 73
End: 2018-12-06

## 2018-12-06 ENCOUNTER — HOSPITAL ENCOUNTER (OUTPATIENT)
Facility: HOSPITAL | Age: 73
Setting detail: HOSPITAL OUTPATIENT SURGERY
Discharge: HOME | End: 2018-12-06
Attending: INTERNAL MEDICINE | Admitting: INTERNAL MEDICINE

## 2018-12-06 VITALS
SYSTOLIC BLOOD PRESSURE: 129 MMHG | BODY MASS INDEX: 18.08 KG/M2 | DIASTOLIC BLOOD PRESSURE: 64 MMHG | OXYGEN SATURATION: 98 % | HEIGHT: 62 IN | HEART RATE: 78 BPM | WEIGHT: 98.25 LBS | TEMPERATURE: 97.7 F | RESPIRATION RATE: 16 BRPM

## 2018-12-06 DIAGNOSIS — K22.4 ESOPHAGEAL SPASM: ICD-10-CM

## 2018-12-06 PROCEDURE — 88305 TISSUE EXAM BY PATHOLOGIST: CPT | Performed by: PATHOLOGY

## 2018-12-06 PROCEDURE — 25010000002 PROPOFOL 10 MG/ML EMULSION: Performed by: NURSE ANESTHETIST, CERTIFIED REGISTERED

## 2018-12-06 PROCEDURE — 88305 TISSUE EXAM BY PATHOLOGIST: CPT | Performed by: INTERNAL MEDICINE

## 2018-12-06 RX ORDER — DEXTROSE AND SODIUM CHLORIDE 5; .45 G/100ML; G/100ML
20 INJECTION, SOLUTION INTRAVENOUS CONTINUOUS
Status: DISCONTINUED | OUTPATIENT
Start: 2018-12-06 | End: 2018-12-06 | Stop reason: HOSPADM

## 2018-12-06 RX ORDER — LIDOCAINE HYDROCHLORIDE 10 MG/ML
INJECTION, SOLUTION INFILTRATION; PERINEURAL AS NEEDED
Status: DISCONTINUED | OUTPATIENT
Start: 2018-12-06 | End: 2018-12-06 | Stop reason: SURG

## 2018-12-06 RX ORDER — PROPOFOL 10 MG/ML
VIAL (ML) INTRAVENOUS AS NEEDED
Status: DISCONTINUED | OUTPATIENT
Start: 2018-12-06 | End: 2018-12-06 | Stop reason: SURG

## 2018-12-06 RX ADMIN — PROPOFOL 20 MG: 10 INJECTION, EMULSION INTRAVENOUS at 09:03

## 2018-12-06 RX ADMIN — PROPOFOL 20 MG: 10 INJECTION, EMULSION INTRAVENOUS at 09:04

## 2018-12-06 RX ADMIN — PROPOFOL 30 MG: 10 INJECTION, EMULSION INTRAVENOUS at 09:01

## 2018-12-06 RX ADMIN — DEXTROSE AND SODIUM CHLORIDE 20 ML/HR: 5; 450 INJECTION, SOLUTION INTRAVENOUS at 07:58

## 2018-12-06 RX ADMIN — PROPOFOL 70 MG: 10 INJECTION, EMULSION INTRAVENOUS at 08:59

## 2018-12-06 RX ADMIN — LIDOCAINE HYDROCHLORIDE 40 MG: 10 INJECTION, SOLUTION INFILTRATION; PERINEURAL at 08:59

## 2018-12-06 RX ADMIN — PROPOFOL 20 MG: 10 INJECTION, EMULSION INTRAVENOUS at 09:05

## 2018-12-06 NOTE — DISCHARGE INSTRUCTIONS
Esophagitis  Esophagitis is inflammation of the esophagus. The esophagus is the tube that carries food and liquids from your mouth to your stomach. Esophagitis can cause soreness or pain in the esophagus. This condition can make it difficult and painful to swallow.  What are the causes?  Most causes of esophagitis are not serious. Common causes of this condition include:  · Gastroesophageal reflux disease (GERD). This is when stomach contents move back up into the esophagus (reflux).  · Repeated vomiting.  · An allergic-type reaction, especially caused by food allergies (eosinophilic esophagitis).  · Injury to the esophagus by swallowing large pills with or without water, or swallowing certain types of medicines.  · Swallowing (ingesting) harmful chemicals, such as household cleaning products.  · Heavy alcohol use.  · An infection of the esophagus. This most often occurs in people who have a weakened immune system.  · Radiation or chemotherapy treatment for cancer.  · Certain diseases such as sarcoidosis, Crohn disease, and scleroderma.    What are the signs or symptoms?  Symptoms of this condition include:  · Difficult or painful swallowing.  · Pain with swallowing acidic liquids, such as citrus juices.  · Pain with burping.  · Chest pain.  · Difficulty breathing.  · Nausea.  · Vomiting.  · Pain in the abdomen.  · Weight loss.  · Ulcers in the mouth.  · Patches of white material in the mouth (candidiasis).  · Fever.  · Coughing up blood or vomiting blood.  · Stool that is black, tarry, or bright red.    How is this diagnosed?  Your health care provider will take a medical history and perform a physical exam. You may also have other tests, including:  · An endoscopy to examine your stomach and esophagus with a small camera.  · A test that measures the acidity level in your esophagus.  · A test that measures how much pressure is on your esophagus.  · A barium swallow or modified barium swallow to show the shape,  size, and functioning of your esophagus.  · Allergy tests.    How is this treated?  Treatment for this condition depends on the cause of your esophagitis. In some cases, steroids or other medicines may be given to help relieve your symptoms or to treat the underlying cause of your condition. You may have to make some lifestyle changes, such as:  · Avoiding alcohol.  · Quitting smoking.  · Changing your diet.  · Exercising.  · Changing your sleep habits and your sleep environment.    Follow these instructions at home:  Take these actions to decrease your discomfort and to help avoid complications.  Diet  · Follow a diet as recommended by your health care provider. This may involve avoiding foods and drinks such as:  ? Coffee and tea (with or without caffeine).  ? Drinks that contain alcohol.  ? Energy drinks and sports drinks.  ? Carbonated drinks or sodas.  ? Chocolate and cocoa.  ? Peppermint and mint flavorings.  ? Garlic and onions.  ? Horseradish.  ? Spicy and acidic foods, including peppers, chili powder, alvarado powder, vinegar, hot sauces, and barbecue sauce.  ? Citrus fruit juices and citrus fruits, such as oranges, jace, and limes.  ? Tomato-based foods, such as red sauce, chili, salsa, and pizza with red sauce.  ? Fried and fatty foods, such as donuts, french fries, potato chips, and high-fat dressings.  ? High-fat meats, such as hot dogs and fatty cuts of red and white meats, such as rib eye steak, sausage, ham, and velazco.  ? High-fat dairy items, such as whole milk, butter, and cream cheese.  · Eat small, frequent meals instead of large meals.  · Avoid drinking large amounts of liquid with your meals.  · Avoid eating meals during the 2-3 hours before bedtime.  · Avoid lying down right after you eat.  · Do not exercise right after you eat.  · Avoid foods and drinks that seem to make your symptoms worse.  General instructions  · Pay attention to any changes in your symptoms.  · Take over-the-counter and  prescription medicines only as told by your health care provider. Do not take aspirin, ibuprofen, or other NSAIDs unless your health care provider told you to do so.  · If you have trouble taking pills, use a pill splitter to decrease the size of the pill. This will decrease the chance of the pill getting stuck or injuring your esophagus on the way down. Also, drink water after you take a pill.  · Do not use any tobacco products, including cigarettes, chewing tobacco, and e-cigarettes. If you need help quitting, ask your health care provider.  · Wear loose-fitting clothing. Do not wear anything tight around your waist that causes pressure on your abdomen.  · Raise (elevate) the head of your bed about 6 inches (15 cm).  · Try to reduce your stress, such as with yoga or meditation. If you need help reducing stress, ask your health care provider.  · If you are overweight, reduce your weight to an amount that is healthy for you. Ask your health care provider for guidance about a safe weight loss goal.  · Keep all follow-up visits as told by your health care provider. This is important.  Contact a health care provider if:  · You have new symptoms.  · You have unexplained weight loss.  · You have difficulty swallowing, or it hurts to swallow.  · You have wheezing or a persistent cough.  · Your symptoms do not improve with treatment.  · You have frequent heartburn for more than two weeks.  Get help right away if:  · You have severe pain in your arms, neck, jaw, teeth, or back.  · You feel sweaty, dizzy, or light-headed.  · You have chest pain or shortness of breath.  · You vomit and your vomit looks like blood or coffee grounds.  · Your stool is bloody or black.  · You have a fever.  · You cannot swallow, drink, or eat.  This information is not intended to replace advice given to you by your health care provider. Make sure you discuss any questions you have with your health care provider.  Document Released: 01/25/2006  Document Revised: 05/25/2017 Document Reviewed: 04/13/2016  [employer] Interactive Patient Education © 2018 [employer]  Outpatient Instructions for Monitored Anesthesia Care (MAC)    1. You will be released from the hospital in the care of a responsible adult who should remain with you for at least 6 hours.    2. You are at an increased risk for falls following anesthesia. Use care when changing from a lying to a sitting position. Use your assistive devices ( example: cane, walker or family member).    3. You must NOT drive a car, climb high places such as a ladder, or operate equipment such as electric knives,stoves etc...for at least 12 hours. If you are dizzy for longer than 24 hours, notify your doctor.    4. DO NOT drink any alcoholic beverages for at least 24 hours. Anesthesia may impair your judgement.    5. If you smoke, do not smoke alone due to increased risk of burns/fires.    6. DO NOT undertake any legally binding commitment for at least 24 hours. Anesthesia may impair your judgement.

## 2018-12-06 NOTE — ANESTHESIA POSTPROCEDURE EVALUATION
Patient: aMry Anne Wolf    Procedure Summary     Date:  12/06/18 Room / Location:  [facility] ENDOSCOPY 2 / [facility] ENDOSCOPY    Anesthesia Start:  0855 Anesthesia Stop:  0910    Procedure:  ESOPHAGOGASTRODUODENOSCOPY (N/A ) Diagnosis:       Esophageal spasm      (Esophageal spasm [K22.4])    Surgeon:  Cory Bliss DO Provider:  Victoria Juarez CRNA    Anesthesia Type:  MAC ASA Status:  3          Anesthesia Type: MAC  Last vitals  BP   136/80 (12/06/18 0747)   Temp   96.1 °F (35.6 °C) (12/06/18 0747)   Pulse   70 (12/06/18 0747)   Resp   18 (12/06/18 0747)     SpO2   94 % (12/06/18 0747)     Post Anesthesia Care and Evaluation    Patient location during evaluation: bedside  Patient participation: complete - patient participated  Level of consciousness: sleepy but conscious  Pain management: adequate  Airway patency: patent  Anesthetic complications: No anesthetic complications  PONV Status: none  Cardiovascular status: acceptable  Respiratory status: acceptable  Hydration status: acceptable

## 2018-12-06 NOTE — ANESTHESIA PREPROCEDURE EVALUATION
Anesthesia Evaluation     no history of anesthetic complications:  NPO Solid Status: > 8 hours  NPO Liquid Status: > 8 hours           Airway   Mallampati: II  TM distance: >3 FB  Neck ROM: full  No difficulty expected  Dental - normal exam     Pulmonary - normal exam   (+) a smoker Current,   Cardiovascular - normal exam    (+) hypertension less than 2 medications,       Neuro/Psych  (+) seizures, dizziness/light headedness (vertigo),     GI/Hepatic/Renal/Endo    (+)  GERD,      Musculoskeletal     Abdominal    Substance History      OB/GYN          Other                        Anesthesia Plan    ASA 3     MAC     intravenous induction   Anesthetic plan, all risks, benefits, and alternatives have been provided, discussed and informed consent has been obtained with: patient.

## 2018-12-06 NOTE — H&P
Eddi Guerra DO,[facility]  Gastroenterology  Hepatology  Endoscopy  Board Certified in Internal Medicine and gastroenterology  [address], [address]  [place]. [address]  - [phone]   F [phone]     GASTROENTEROLOGY HISTORY AND PHYSICAL  NOTE   EDDI GUERRA DO.         SUBJECTIVE:   12/6/2018    Name: Mary Anne Wolf  DOD: 1945        Chief Complaint:       Subjective : Dysphagia.  Manometry testing shows non-peristaltic esophageal body with normal LES.  Normal relaxation.  No achalasia.  Has systemic lupus erythematosus    Patient is 73 y.o. female presents with dysphagia with evaluation by EGD with biopsy and dilation.  No Botox is planned.      ROS/HISTORY/ CURRENT MEDICATIONS/OBJECTIVE/VS/PE:   Review of Systems:  All systems unremarkable unless specified below.  Constitutional   HENT  Eyes   Respiratory    Cardiovascular  Gastrointestinal   Endocrine  Genitourinary    Musculoskeletal   Skin  Allergic/Immunologic    Neurological    Hematological  Psychiatric/Behavioral    History:     Past Medical History:   Diagnosis Date   • Acute posthemorrhagic anemia    • Backache     LLE radiculopathy   • Cigarette smoker    • Discoid lupus erythematosus    • Diverticular disease of colon     incomplete colonoscopy to Hepatic flexure.  Rec ACBE, pt. considering   • Dizziness and giddiness    • Dysphagia    • Esophagitis     grade III   • Essential (primary) hypertension    • Essential hypertension    • Essential hypertension    • Gastroesophageal reflux disease     rec pt try RX prilosec   • H/O screening mammography    • Localization-related (focal) (partial) symptomatic epilepsy and epileptic syndromes with complex partial seizures, intractable, without status epilepticus (CMS/HCC)    • Lupus    • Neuropathy    • Neuropathy    • Nicotine dependence    • Osteopenia    • Periumbilical pain    • Seizure disorder (CMS/HCC)    • Seizure disorder (CMS/HCC)     history of complex partial  seizure   • Stricture of esophagus    • Systemic lupus erythematosus (CMS/HCC)    • Systemic lupus erythematosus (CMS/HCC)    • Vertigo    • Vertigo      Past Surgical History:   Procedure Laterality Date   • COLONOSCOPY  08/05/2015   • COLONOSCOPY  07/08/2014    REFUSED BY PATIENT   • COLONOSCOPY  08/05/2015    a diverticulum was found in the sigmoid colon   • ENDOSCOPY  08/05/2015    Esophageal stricture was present. Dilatatin was performed. Esophagitis . Biopsy taken. Normal stomach. Normal duodenum   • INJECTION OF MEDICATION      Rocehin (2)   • UPPER GASTROINTESTINAL ENDOSCOPY  08/05/2015     Family History   Problem Relation Age of Onset   • Lupus Mother    • Rheum arthritis Mother    • Lupus Sister      Social History     Tobacco Use   • Smoking status: Current Every Day Smoker     Packs/day: 0.25     Types: Cigarettes   • Smokeless tobacco: Never Used   • Tobacco comment: 2 a day   Substance Use Topics   • Alcohol use: No   • Drug use: No     Medications Prior to Admission   Medication Sig Dispense Refill Last Dose   • amLODIPine (NORVASC) 2.5 MG tablet Take 1 tablet by mouth Daily. 30 tablet 11 12/5/2018 at Unknown time   • cetirizine (zyrTEC) 10 MG tablet Take 1 tablet by mouth Daily. 30 tablet 11 12/5/2018 at Unknown time   • Cyanocobalamin (VITAMIN B-12 ER) 1500 MCG tablet controlled-release Take 1 tablet by mouth Daily.   12/5/2018 at Unknown time   • denosumab (PROLIA) 60 MG/ML solution syringe Inject 60 mg under the skin 1 (One) Time.   12/5/2018 at Unknown time   • esomeprazole (nexIUM) 40 MG capsule Take 40 mg by mouth Every Morning Before Breakfast.   12/5/2018 at Unknown time   • fluticasone (FLONASE) 50 MCG/ACT nasal spray 2 sprays into each nostril Daily. 1 bottle 11 12/5/2018 at Unknown time   • gabapentin (NEURONTIN) 400 MG capsule 1 tid 270 capsule 2 12/5/2018 at Unknown time   • hydroxychloroquine (PLAQUENIL) 200 MG tablet Take 1 tablet by mouth Daily. 30 tablet 2 12/5/2018 at Unknown time    • meclizine (ANTIVERT) 25 MG tablet Take 1 tablet by mouth 3 (Three) Times a Day As Needed for dizziness. 30 tablet 5 12/5/2018 at Unknown time   • predniSONE (DELTASONE) 10 MG tablet Take 10 mg by mouth Daily.   12/5/2018 at Unknown time     Allergies:  Milk-related compounds    I have reviewed the patients medical history, surgical history and family history in the available medical record system.     Current Medications:     Current Facility-Administered Medications   Medication Dose Route Frequency Provider Last Rate Last Dose   • dextrose 5 % and sodium chloride 0.45 % infusion  20 mL/hr Intravenous Continuous Cory Bliss DO 20 mL/hr at 12/06/18 0758 20 mL/hr at 12/06/18 0758   • onabotulinumtoxina (BOTOX) injection 100 Units  100 Units Intramuscular Once Cory Bliss DO           Objective     Physical Exam:   Temp:  [96.1 °F (35.6 °C)] 96.1 °F (35.6 °C)  Heart Rate:  [70] 70  Resp:  [18] 18  BP: (136)/(80) 136/80    Physical Exam:  General Appearance:    Alert, cooperative, in no acute distress   Head:    Normocephalic, without obvious abnormality, atraumatic   Eyes:            Lids and lashes normal, conjunctivae and sclerae normal, no   icterus, no pallor, corneas clear, PERRLA   Ears:    Ears appear intact with no abnormalities noted   Throat:   No oral lesions, no thrush, oral mucosa moist   Neck:   No adenopathy, supple, trachea midline, no thyromegaly, no     carotid bruit, no JVD   Back:     No kyphosis present, no scoliosis present, no skin lesions,       erythema or scars, no tenderness to percussion or                   palpation,   range of motion normal   Lungs:     Clear to auscultation,respirations regular, even and                   unlabored    Heart:    Regular rhythm and normal rate, normal S1 and S2, no            murmur, no gallop, no rub, no click   Breast Exam:    Deferred   Abdomen:     Normal bowel sounds, no masses, no organomegaly, soft        non-tender,  non-distended, no guarding, no rebound                 tenderness   Genitalia:    Deferred   Extremities:   Moves all extremities well, no edema, no cyanosis, no              redness   Pulses:   Pulses palpable and equal bilaterally   Skin:   No bleeding, bruising or rash   Lymph nodes:   No palpable adenopathy   Neurologic:   Cranial nerves 2 - 12 grossly intact, sensation intact, DTR        present and equal bilaterally      Results Review:     Lab Results   Component Value Date    WBC 5.29 12/11/2017    WBC 12.89 (H) 12/10/2017    WBC 4.0 08/10/2016    HGB 11.6 (L) 12/11/2017    HGB 13.0 12/10/2017    HGB 11.7 (L) 08/10/2016    HCT 34.4 (L) 12/11/2017    HCT 39.5 12/10/2017    HCT 36.7 08/10/2016     12/11/2017     12/10/2017     (L) 08/10/2016             No results found for: LIPASE  No results found for: INR       Radiology Review:  Imaging Results (last 72 hours)     ** No results found for the last 72 hours. **           I reviewed the patient's new clinical results.  I reviewed the patient's new imaging results and agree with the interpretation.     ASSESSMENT/PLAN:   ASSESSMENT:   1.  Dysphagia with motility disorder    PLAN:   1.  Esophagogastroduodenoscopy with biopsy and dilation, empirically done    Risk and benefits associated with the procedure are reviewed with the patient.  The patient wishes to proceed      Cory Bliss DO  12/06/18  8:47 AM

## 2018-12-07 LAB
LAB AP CASE REPORT: NORMAL
PATH REPORT.FINAL DX SPEC: NORMAL
PATH REPORT.GROSS SPEC: NORMAL

## 2018-12-27 DIAGNOSIS — G62.9 NEUROPATHY: Chronic | ICD-10-CM

## 2018-12-27 RX ORDER — GABAPENTIN 400 MG/1
CAPSULE ORAL
Qty: 270 CAPSULE | Refills: 1 | Status: SHIPPED | OUTPATIENT
Start: 2018-12-27 | End: 2019-07-09 | Stop reason: SDUPTHER

## 2018-12-27 RX ORDER — GABAPENTIN 400 MG/1
CAPSULE ORAL
Qty: 90 CAPSULE | Refills: 8 | Status: CANCELLED | OUTPATIENT
Start: 2018-12-27

## 2019-02-14 RX ORDER — FLUTICASONE PROPIONATE 50 MCG
2 SPRAY, SUSPENSION (ML) NASAL DAILY
Qty: 1 BOTTLE | Refills: 11 | Status: SHIPPED | OUTPATIENT
Start: 2019-02-14 | End: 2020-04-07

## 2019-03-12 ENCOUNTER — OFFICE VISIT (OUTPATIENT)
Dept: FAMILY MEDICINE CLINIC | Facility: CLINIC | Age: 74
End: 2019-03-12

## 2019-03-12 VITALS
SYSTOLIC BLOOD PRESSURE: 132 MMHG | BODY MASS INDEX: 19.27 KG/M2 | WEIGHT: 104.7 LBS | DIASTOLIC BLOOD PRESSURE: 70 MMHG | HEIGHT: 62 IN

## 2019-03-12 DIAGNOSIS — Z72.0 TOBACCO USE: Chronic | ICD-10-CM

## 2019-03-12 DIAGNOSIS — I10 ESSENTIAL HYPERTENSION: Primary | Chronic | ICD-10-CM

## 2019-03-12 DIAGNOSIS — M32.9 SYSTEMIC LUPUS ERYTHEMATOSUS, UNSPECIFIED SLE TYPE, UNSPECIFIED ORGAN INVOLVEMENT STATUS (HCC): Chronic | ICD-10-CM

## 2019-03-12 DIAGNOSIS — K21.9 GASTROESOPHAGEAL REFLUX DISEASE, ESOPHAGITIS PRESENCE NOT SPECIFIED: Chronic | ICD-10-CM

## 2019-03-12 PROBLEM — K22.4 ESOPHAGEAL DYSMOTILITIES: Chronic | Status: ACTIVE | Noted: 2019-03-12

## 2019-03-12 PROBLEM — K22.4 ESOPHAGEAL DYSMOTILITIES: Status: ACTIVE | Noted: 2019-03-12

## 2019-03-12 PROBLEM — R13.10 DYSPHAGIA: Chronic | Status: ACTIVE | Noted: 2018-10-02

## 2019-03-12 PROCEDURE — 99214 OFFICE O/P EST MOD 30 MIN: CPT | Performed by: FAMILY MEDICINE

## 2019-03-12 RX ORDER — AMLODIPINE BESYLATE 2.5 MG/1
2.5 TABLET ORAL DAILY
Qty: 90 TABLET | Refills: 3 | Status: SHIPPED | OUTPATIENT
Start: 2019-03-12 | End: 2020-01-07

## 2019-03-12 RX ORDER — SUCRALFATE 1 G/1
1 TABLET ORAL 4 TIMES DAILY
Qty: 120 TABLET | Refills: 3 | Status: SHIPPED | OUTPATIENT
Start: 2019-03-12 | End: 2020-03-11

## 2019-03-12 RX ORDER — FLUOCINONIDE TOPICAL SOLUTION USP, 0.05% 0.5 MG/ML
SOLUTION TOPICAL
Qty: 20 ML | Refills: 5 | Status: SHIPPED | OUTPATIENT
Start: 2019-03-12 | End: 2020-09-11 | Stop reason: SDUPTHER

## 2019-03-12 RX ORDER — PANTOPRAZOLE SODIUM 40 MG/1
40 TABLET, DELAYED RELEASE ORAL DAILY
Qty: 90 TABLET | Refills: 1 | Status: SHIPPED | OUTPATIENT
Start: 2019-03-12 | End: 2020-06-08

## 2019-03-12 NOTE — PROGRESS NOTES
Subjective   Mary Anne Wolf is a 73 y.o. female.     History of Present Illness   reevaluation lupus tobacco abuse esophageal dysmotility weight loss blood pressure etc. in the interim is had upper endoscopy which showed esophageal dysmotility but no lesion placed on the Carafate and Protonix.  Has gained 5 pounds.  Continues to smoke unfortunately but states less.  Is been to visit her rheumatologist had lab work done.  Her medicines have remained the same.  History reviewed.    The following portions of the patient's history were reviewed and updated as appropriate: allergies, current medications, past family history, past medical history, past social history, past surgical history and problem list.    Review of Systems   Constitutional: Negative for activity change, appetite change, fatigue and unexpected weight change.   HENT: Positive for ear pain (Bilateral itchy ears of mild eczema). Negative for trouble swallowing and voice change.    Eyes: Negative for redness and visual disturbance.   Respiratory: Negative for cough and wheezing.    Cardiovascular: Negative for chest pain and palpitations.   Gastrointestinal: Negative for abdominal pain, constipation, diarrhea, nausea and vomiting.   Genitourinary: Negative for urgency.   Musculoskeletal: Negative for joint swelling.   Neurological: Negative for syncope and headaches.   Hematological: Negative for adenopathy.   Psychiatric/Behavioral: Negative for sleep disturbance.       Objective   Physical Exam   Constitutional: She is oriented to person, place, and time. She appears well-developed.   HENT:   Head: Normocephalic.   Nose: Nose normal.   Mouth/Throat: Oropharynx is clear and moist.   Mild dryness bilateral ear canals   Eyes: Pupils are equal, round, and reactive to light.   Neck: Normal range of motion. No thyromegaly present.   Cardiovascular: Normal rate, regular rhythm, normal heart sounds and intact distal pulses. Exam reveals no gallop and no  friction rub.   No murmur heard.  Pulmonary/Chest: Breath sounds normal.   Abdominal: Soft. She exhibits no distension and no mass. There is no tenderness.   Musculoskeletal: Normal range of motion.   Neurological: She is alert and oriented to person, place, and time. She has normal reflexes.   Skin: Skin is warm and dry.   Psychiatric: She has a normal mood and affect.       Assessment/Plan   Mary Anne was seen today for hypertension.    Diagnoses and all orders for this visit:    Essential hypertension    Systemic lupus erythematosus, unspecified SLE type, unspecified organ involvement status (CMS/[facility])    Tobacco use    Gastroesophageal reflux disease, esophagitis presence not specified  -     pantoprazole (PROTONIX) 40 MG EC tablet; Take 1 tablet by mouth Daily.  -     sucralfate (CARAFATE) 1 g tablet; Take 1 tablet by mouth 4 (Four) Times a Day. For stomach till symptoms gone    Other orders  -     fluocinonide (LIDEX) 0.05 % external solution; 1 -2 gtts in ears qhs prn itch       on stopping smoking.  3-10m      lifestyle measures orders as above recheckt 6  months B time for bone density and mammogram

## 2019-04-29 ENCOUNTER — APPOINTMENT (OUTPATIENT)
Dept: ONCOLOGY | Facility: HOSPITAL | Age: 74
End: 2019-04-29

## 2019-05-02 ENCOUNTER — TELEPHONE (OUTPATIENT)
Dept: FAMILY MEDICINE CLINIC | Facility: CLINIC | Age: 74
End: 2019-05-02

## 2019-05-02 ENCOUNTER — APPOINTMENT (OUTPATIENT)
Dept: ONCOLOGY | Facility: HOSPITAL | Age: 74
End: 2019-05-02

## 2019-05-02 DIAGNOSIS — R42 VERTIGO: ICD-10-CM

## 2019-05-02 RX ORDER — MECLIZINE HYDROCHLORIDE 25 MG/1
25 TABLET ORAL 3 TIMES DAILY PRN
Qty: 30 TABLET | Refills: 5 | Status: SHIPPED | OUTPATIENT
Start: 2019-05-02 | End: 2020-03-09

## 2019-05-02 NOTE — TELEPHONE ENCOUNTER
She called and said she needs refills for meclizine 25 mg sent to [facility] in [place] and also said she needs order for Prolia so she can get it done at [facility].She can be reached at [phone].

## 2019-05-08 DIAGNOSIS — M81.0 OSTEOPOROSIS, UNSPECIFIED OSTEOPOROSIS TYPE, UNSPECIFIED PATHOLOGICAL FRACTURE PRESENCE: Primary | Chronic | ICD-10-CM

## 2019-05-15 ENCOUNTER — TELEPHONE (OUTPATIENT)
Dept: ONCOLOGY | Facility: HOSPITAL | Age: 74
End: 2019-05-15

## 2019-05-15 NOTE — TELEPHONE ENCOUNTER
----- Message from Kim Li sent at 5/13/2019  9:28 AM CDT -----  Regarding: prolia  Pt called asking about scheduling her prolia.  I saw a note where we were still needing an order.  Pt stated she just left Dr Ruby' office and he stated he has sent three times.  Do you guys see it?

## 2019-05-15 NOTE — TELEPHONE ENCOUNTER
I spoke with pt told her that we still do not have an order for prolia. I sent an inbox to daljit Ruby asking for therapy plan to be placed or a written order faxed to us. Pt voiced undestanding.

## 2019-05-23 ENCOUNTER — INFUSION (OUTPATIENT)
Dept: ONCOLOGY | Facility: HOSPITAL | Age: 74
End: 2019-05-23

## 2019-05-23 DIAGNOSIS — M81.0 OSTEOPOROSIS, UNSPECIFIED OSTEOPOROSIS TYPE, UNSPECIFIED PATHOLOGICAL FRACTURE PRESENCE: Primary | ICD-10-CM

## 2019-05-23 LAB
CALCIUM SPEC-SCNC: 9.3 MG/DL (ref 8.6–10.5)
MAGNESIUM SERPL-MCNC: 1.9 MG/DL (ref 1.6–2.4)
PHOSPHATE SERPL-MCNC: 4.4 MG/DL (ref 2.5–4.5)

## 2019-05-23 PROCEDURE — 96372 THER/PROPH/DIAG INJ SC/IM: CPT | Performed by: NURSE PRACTITIONER

## 2019-05-23 PROCEDURE — 84100 ASSAY OF PHOSPHORUS: CPT | Performed by: NURSE PRACTITIONER

## 2019-05-23 PROCEDURE — 36415 COLL VENOUS BLD VENIPUNCTURE: CPT | Performed by: NURSE PRACTITIONER

## 2019-05-23 PROCEDURE — 25010000002 DENOSUMAB 60 MG/ML SOLUTION PREFILLED SYRINGE: Performed by: NURSE PRACTITIONER

## 2019-05-23 PROCEDURE — 82310 ASSAY OF CALCIUM: CPT | Performed by: NURSE PRACTITIONER

## 2019-05-23 PROCEDURE — 83735 ASSAY OF MAGNESIUM: CPT | Performed by: NURSE PRACTITIONER

## 2019-05-23 RX ADMIN — DENOSUMAB 60 MG: 60 INJECTION SUBCUTANEOUS at 10:26

## 2019-07-09 DIAGNOSIS — G62.9 NEUROPATHY: Chronic | ICD-10-CM

## 2019-07-09 RX ORDER — GABAPENTIN 400 MG/1
CAPSULE ORAL
Qty: 270 CAPSULE | Refills: 1 | Status: SHIPPED | OUTPATIENT
Start: 2019-07-09 | End: 2019-09-11

## 2019-07-09 RX ORDER — GABAPENTIN 400 MG/1
CAPSULE ORAL
Qty: 270 CAPSULE | Refills: 1 | Status: CANCELLED | OUTPATIENT
Start: 2019-07-09

## 2019-09-11 ENCOUNTER — OFFICE VISIT (OUTPATIENT)
Dept: FAMILY MEDICINE CLINIC | Facility: CLINIC | Age: 74
End: 2019-09-11

## 2019-09-11 VITALS
HEIGHT: 62 IN | SYSTOLIC BLOOD PRESSURE: 122 MMHG | DIASTOLIC BLOOD PRESSURE: 72 MMHG | BODY MASS INDEX: 19.05 KG/M2 | WEIGHT: 103.5 LBS

## 2019-09-11 DIAGNOSIS — Z72.0 TOBACCO USE: Chronic | ICD-10-CM

## 2019-09-11 DIAGNOSIS — Z12.31 VISIT FOR SCREENING MAMMOGRAM: ICD-10-CM

## 2019-09-11 DIAGNOSIS — Z12.11 SCREEN FOR COLON CANCER: ICD-10-CM

## 2019-09-11 DIAGNOSIS — M81.0 OSTEOPOROSIS, UNSPECIFIED OSTEOPOROSIS TYPE, UNSPECIFIED PATHOLOGICAL FRACTURE PRESENCE: Chronic | ICD-10-CM

## 2019-09-11 DIAGNOSIS — Z23 NEED FOR VACCINATION: ICD-10-CM

## 2019-09-11 DIAGNOSIS — K22.4 ESOPHAGEAL DYSMOTILITIES: Chronic | ICD-10-CM

## 2019-09-11 DIAGNOSIS — M32.9 SYSTEMIC LUPUS ERYTHEMATOSUS, UNSPECIFIED SLE TYPE, UNSPECIFIED ORGAN INVOLVEMENT STATUS (HCC): Chronic | ICD-10-CM

## 2019-09-11 DIAGNOSIS — G62.9 NEUROPATHY: Chronic | ICD-10-CM

## 2019-09-11 DIAGNOSIS — I10 ESSENTIAL HYPERTENSION: Primary | Chronic | ICD-10-CM

## 2019-09-11 PROCEDURE — G0008 ADMIN INFLUENZA VIRUS VAC: HCPCS | Performed by: FAMILY MEDICINE

## 2019-09-11 PROCEDURE — 99214 OFFICE O/P EST MOD 30 MIN: CPT | Performed by: FAMILY MEDICINE

## 2019-09-11 PROCEDURE — 90653 IIV ADJUVANT VACCINE IM: CPT | Performed by: FAMILY MEDICINE

## 2019-09-11 RX ORDER — GABAPENTIN 600 MG/1
TABLET ORAL
Qty: 90 TABLET | Refills: 2 | Status: SHIPPED | OUTPATIENT
Start: 2019-09-11 | End: 2019-09-30

## 2019-09-11 NOTE — PROGRESS NOTES
Subjective   Mary Anne Wolf is a 74 y.o. female.     History of Present Illness   reevaluation lupus with neuropathy osteoporosis osteoarthritis tobacco abuse history of esophageal dysmotility diagnoses below.  In the interim continues to visit with rheumatology.  Neuropathy continues to cause a lot of problems at night will increase dosing of gabapentin again continue to walk using a cane but is gained 4 pounds.   use of a Rollator to help with better fall prevention.  Time for mammogram and bone density.  Also time for colonoscopy or Cologuard and flu vaccine.  In the interim continue to have arthritic and neuropathic complaints.  Overall states trying to be as active as possible.  For she continues to smoke.    The following portions of the patient's history were reviewed and updated as appropriate: allergies, current medications, past family history, past medical history, past social history, past surgical history and problem list.    Review of Systems   Constitutional: Negative for activity change, appetite change, fatigue and unexpected weight change.   HENT: Positive for ear pain (Intermittent left mild eustachian tube dysfunction normal exam). Negative for trouble swallowing and voice change.    Eyes: Negative for redness and visual disturbance.   Respiratory: Negative for cough and wheezing.    Cardiovascular: Negative for chest pain and palpitations.   Gastrointestinal: Negative for abdominal pain, constipation, diarrhea, nausea and vomiting.   Genitourinary: Negative for urgency.   Musculoskeletal: Positive for arthralgias, back pain and gait problem. Negative for joint swelling.   Skin: Positive for color change (Lipoma left upper arm chronic counseled on same).   Neurological: Negative for syncope and headaches.   Hematological: Negative for adenopathy.   Psychiatric/Behavioral: Negative for sleep disturbance.       Objective   Physical Exam   Constitutional: She is oriented to person, place,  and time. She appears well-developed.   HENT:   Head: Normocephalic.   Right Ear: External ear normal.   Left Ear: External ear normal.   Nose: Nose normal.   Mouth/Throat: Oropharynx is clear and moist.   Eyes: Pupils are equal, round, and reactive to light.   Neck: Normal range of motion. No thyromegaly present.   Cardiovascular: Normal rate, regular rhythm, normal heart sounds and intact distal pulses. Exam reveals no gallop and no friction rub.   No murmur heard.  Pulmonary/Chest: Breath sounds normal.   Abdominal: Soft. She exhibits no distension and no mass. There is no tenderness.   Musculoskeletal: Normal range of motion.   No peripheral edema 1-2+ pulses no skin breakdown no rash   Neurological: She is alert and oriented to person, place, and time. She has normal reflexes.   Skin: Skin is warm and dry.   Psychiatric: Her affect is blunt. Her speech is delayed. She is slowed.       Assessment/Plan   Mary Anne was seen today for follow-up.    Diagnoses and all orders for this visit:    Essential hypertension    Esophageal dysmotilities    Osteoporosis, unspecified osteoporosis type, unspecified pathological fracture presence  -     DEXA Bone Density Axial    Systemic lupus erythematosus, unspecified SLE type, unspecified organ involvement status (CMS/[facility])    Tobacco use    Need for vaccination  -     Fluzone High Dose =>65Years    Visit for screening mammogram  -     Mammo Screening Digital Tomosynthesis Bilateral With CAD    Neuropathy  -     gabapentin (NEURONTIN) 600 MG tablet; 1qhs for feet new dosing    Screen for colon cancer  -     Cologuard - Stool, Per Rectum; Future       on stopping smoking.  3-10m      lifestyle measures fall prevention as before.  Increase gabapentin 600 mg at night.  Orders as above.  Continue following up with subspecialist.  Recheck 6 months

## 2019-09-23 ENCOUNTER — TELEPHONE (OUTPATIENT)
Dept: FAMILY MEDICINE CLINIC | Facility: CLINIC | Age: 74
End: 2019-09-23

## 2019-09-23 DIAGNOSIS — Z12.11 SCREEN FOR COLON CANCER: ICD-10-CM

## 2019-09-30 ENCOUNTER — TELEPHONE (OUTPATIENT)
Dept: FAMILY MEDICINE CLINIC | Facility: CLINIC | Age: 74
End: 2019-09-30

## 2019-09-30 DIAGNOSIS — G62.9 NEUROPATHY: Chronic | ICD-10-CM

## 2019-09-30 RX ORDER — GABAPENTIN 400 MG/1
CAPSULE ORAL
Qty: 90 CAPSULE | Refills: 2 | Status: SHIPPED | OUTPATIENT
Start: 2019-09-30 | End: 2020-03-11

## 2019-11-18 DIAGNOSIS — G43.909 MIGRAINE SYNDROME: Primary | ICD-10-CM

## 2019-11-18 RX ORDER — DIVALPROEX SODIUM 500 MG/1
500 TABLET, EXTENDED RELEASE ORAL DAILY
Qty: 90 TABLET | Refills: 3 | Status: SHIPPED | OUTPATIENT
Start: 2019-11-18 | End: 2020-12-29

## 2019-12-04 ENCOUNTER — INFUSION (OUTPATIENT)
Dept: ONCOLOGY | Facility: HOSPITAL | Age: 74
End: 2019-12-04

## 2019-12-04 ENCOUNTER — LAB (OUTPATIENT)
Dept: ONCOLOGY | Facility: HOSPITAL | Age: 74
End: 2019-12-04

## 2019-12-04 VITALS — TEMPERATURE: 97.7 F | HEART RATE: 78 BPM | DIASTOLIC BLOOD PRESSURE: 67 MMHG | SYSTOLIC BLOOD PRESSURE: 128 MMHG

## 2019-12-04 DIAGNOSIS — M81.0 OSTEOPOROSIS, UNSPECIFIED OSTEOPOROSIS TYPE, UNSPECIFIED PATHOLOGICAL FRACTURE PRESENCE: Primary | Chronic | ICD-10-CM

## 2019-12-04 DIAGNOSIS — M81.0 OSTEOPOROSIS, UNSPECIFIED OSTEOPOROSIS TYPE, UNSPECIFIED PATHOLOGICAL FRACTURE PRESENCE: Primary | ICD-10-CM

## 2019-12-04 LAB
CALCIUM SPEC-SCNC: 9.5 MG/DL (ref 8.6–10.5)
MAGNESIUM SERPL-MCNC: 2.1 MG/DL (ref 1.6–2.4)
PHOSPHATE SERPL-MCNC: 4.3 MG/DL (ref 2.5–4.5)

## 2019-12-04 PROCEDURE — 84100 ASSAY OF PHOSPHORUS: CPT | Performed by: NURSE PRACTITIONER

## 2019-12-04 PROCEDURE — 96372 THER/PROPH/DIAG INJ SC/IM: CPT | Performed by: NURSE PRACTITIONER

## 2019-12-04 PROCEDURE — 25010000002 DENOSUMAB 60 MG/ML SOLUTION PREFILLED SYRINGE: Performed by: NURSE PRACTITIONER

## 2019-12-04 PROCEDURE — 36415 COLL VENOUS BLD VENIPUNCTURE: CPT | Performed by: NURSE PRACTITIONER

## 2019-12-04 PROCEDURE — 83735 ASSAY OF MAGNESIUM: CPT | Performed by: NURSE PRACTITIONER

## 2019-12-04 PROCEDURE — 82310 ASSAY OF CALCIUM: CPT | Performed by: NURSE PRACTITIONER

## 2019-12-04 RX ADMIN — DENOSUMAB 60 MG: 60 INJECTION SUBCUTANEOUS at 10:08

## 2020-01-07 RX ORDER — AMLODIPINE BESYLATE 2.5 MG/1
TABLET ORAL
Qty: 90 TABLET | Refills: 0 | Status: SHIPPED | OUTPATIENT
Start: 2020-01-07 | End: 2020-03-11 | Stop reason: SDUPTHER

## 2020-03-09 DIAGNOSIS — R42 VERTIGO: ICD-10-CM

## 2020-03-09 RX ORDER — MECLIZINE HYDROCHLORIDE 25 MG/1
TABLET ORAL
Qty: 30 TABLET | Refills: 0 | Status: SHIPPED | OUTPATIENT
Start: 2020-03-09 | End: 2020-08-11

## 2020-03-11 ENCOUNTER — OFFICE VISIT (OUTPATIENT)
Dept: FAMILY MEDICINE CLINIC | Facility: CLINIC | Age: 75
End: 2020-03-11

## 2020-03-11 VITALS
WEIGHT: 100.8 LBS | DIASTOLIC BLOOD PRESSURE: 70 MMHG | HEIGHT: 62 IN | SYSTOLIC BLOOD PRESSURE: 118 MMHG | BODY MASS INDEX: 18.55 KG/M2

## 2020-03-11 DIAGNOSIS — I10 ESSENTIAL HYPERTENSION: Chronic | ICD-10-CM

## 2020-03-11 DIAGNOSIS — Z71.89 ADVANCE CARE PLANNING: ICD-10-CM

## 2020-03-11 DIAGNOSIS — Z72.0 TOBACCO USE: Chronic | ICD-10-CM

## 2020-03-11 DIAGNOSIS — M32.9 SYSTEMIC LUPUS ERYTHEMATOSUS, UNSPECIFIED SLE TYPE, UNSPECIFIED ORGAN INVOLVEMENT STATUS (HCC): Chronic | ICD-10-CM

## 2020-03-11 DIAGNOSIS — Z00.00 MEDICARE ANNUAL WELLNESS VISIT, INITIAL: ICD-10-CM

## 2020-03-11 DIAGNOSIS — K22.4 ESOPHAGEAL DYSMOTILITIES: Primary | Chronic | ICD-10-CM

## 2020-03-11 DIAGNOSIS — M81.0 OSTEOPOROSIS, UNSPECIFIED OSTEOPOROSIS TYPE, UNSPECIFIED PATHOLOGICAL FRACTURE PRESENCE: Chronic | ICD-10-CM

## 2020-03-11 DIAGNOSIS — G62.9 NEUROPATHY: Chronic | ICD-10-CM

## 2020-03-11 PROBLEM — G43.909 MIGRAINE SYNDROME: Chronic | Status: ACTIVE | Noted: 2019-11-18

## 2020-03-11 PROCEDURE — 99214 OFFICE O/P EST MOD 30 MIN: CPT | Performed by: FAMILY MEDICINE

## 2020-03-11 PROCEDURE — G0439 PPPS, SUBSEQ VISIT: HCPCS | Performed by: FAMILY MEDICINE

## 2020-03-11 RX ORDER — GABAPENTIN 800 MG/1
TABLET ORAL
Qty: 90 TABLET | Refills: 2 | Status: SHIPPED | OUTPATIENT
Start: 2020-03-11 | End: 2020-12-04 | Stop reason: SDUPTHER

## 2020-03-11 RX ORDER — AMLODIPINE BESYLATE 2.5 MG/1
2.5 TABLET ORAL DAILY
Qty: 90 TABLET | Refills: 3 | Status: SHIPPED | OUTPATIENT
Start: 2020-03-11 | End: 2021-03-12 | Stop reason: SDUPTHER

## 2020-03-11 NOTE — PROGRESS NOTES
Subjective   Mary Anne Wolf is a 74 y.o. female.     History of Present Illness   reevaluation mild hypertension lupus tobacco abuse osteoporosis lupus-induced neuropathy.  In the interim neuropathy continues to cause problems with feet at night.  Have counseled increasing gabapentin with next dosing.  Is continue to see rheumatology and on Plaquenil chronically.  Does not have an eye exam will arrange same on her own.  Mild arthralgias back pain otherwise feels fairly well enforced continues to smoke but not as much.  Up-to-date on all other.  Esophageal dysmotility is currently under control  HPI    The following portions of the patient's history were reviewed and updated as appropriate: allergies, current medications, past family history, past medical history, past social history, past surgical history and problem list.    Review of Systems  Review of Systems   Constitutional: Negative for activity change, appetite change, fatigue and unexpected weight change.   HENT: Negative for trouble swallowing and voice change.    Eyes: Negative for redness and visual disturbance.   Respiratory: Negative for cough and wheezing.    Cardiovascular: Negative for chest pain and palpitations.   Gastrointestinal: Negative for abdominal pain, constipation, diarrhea, nausea and vomiting.   Genitourinary: Negative for urgency.   Musculoskeletal: Positive for arthralgias and back pain. Negative for joint swelling.   Neurological: Positive for numbness. Negative for syncope and headaches.   Hematological: Negative for adenopathy.   Psychiatric/Behavioral: Negative for sleep disturbance.       Objective   Physical Exam  Physical Exam   Constitutional: She is oriented to person, place, and time. She appears well-developed.   HENT:   Head: Normocephalic.   Nose: Nose normal.   Eyes: Pupils are equal, round, and reactive to light.   Neck: Normal range of motion. No thyromegaly present.   Cardiovascular: Normal rate, regular rhythm,  "normal heart sounds and intact distal pulses. Exam reveals no gallop and no friction rub.   No murmur heard.  Pulmonary/Chest: Breath sounds normal.   Abdominal: Soft. She exhibits no distension and no mass. There is no tenderness.   Musculoskeletal: Normal range of motion.   No peripheral edema 1-2+ pulses no skin breakdown.  Gait is up 3 to 5 seconds with cane mild stiffness initially.   Neurological: She is alert and oriented to person, place, and time. She has normal reflexes.   Skin: Skin is warm and dry.   Psychiatric: She has a normal mood and affect. Her speech is normal and behavior is normal.         Visit Vitals  /70 (BP Location: Left arm, Patient Position: Sitting, Cuff Size: Large Adult)   Ht 157.5 cm (62\")   Wt 45.7 kg (100 lb 12.8 oz)   LMP  (LMP Unknown)   BMI 18.44 kg/m²     Body mass index is 18.44 kg/m².      Assessment/Plan   Mary Anne was seen today for follow-up and back pain.    Diagnoses and all orders for this visit:    Esophageal dysmotilities    Neuropathy  -     gabapentin (NEURONTIN) 800 MG tablet; 1 nightly for feet new dosing    Osteoporosis, unspecified osteoporosis type, unspecified pathological fracture presence    Systemic lupus erythematosus, unspecified SLE type, unspecified organ involvement status (CMS/[facility])    Tobacco use    Advance care planning    Medicare annual wellness visit, initial    Essential hypertension  -     amLODIPine (NORVASC) 2.5 MG tablet; Take 1 tablet by mouth Daily.     on stopping smoking.  3-10m     Counseled mainly on fall prevention infection prevention lifestyle measures following with all subspecialist.  Advance Care Planning   ACP discussion was held with the patient during this visit. Patient does not have an advance directive, information provided.  Recheck 6 months with lab depending on rheumatology lab.  "

## 2020-03-11 NOTE — PROGRESS NOTES
The ABCs of the Annual Wellness Visit  Initial Medicare Wellness Visit    Chief Complaint   Patient presents with   • Follow-up     6 mo nathanael    • Back Pain       Subjective   History of Present Illness:  Mary Anne Wolf is a 74 y.o. female who presents for an Initial Medicare Wellness Visit.    HEALTH RISK ASSESSMENT    Recent Hospitalizations:  No hospitalization(s) within the last year.    Current Medical Providers:  Patient Care Team:  Errol Ruby MD as PCP - General (Family Medicine)    Smoking Status:  Social History     Tobacco Use   Smoking Status Current Every Day Smoker   • Packs/day: 0.25   • Types: Cigarettes   Smokeless Tobacco Never Used   Tobacco Comment    2 a day       Alcohol Consumption:  Social History     Substance and Sexual Activity   Alcohol Use No       Depression Screen:   PHQ-2/PHQ-9 Depression Screening 3/11/2020   Little interest or pleasure in doing things 0   Feeling down, depressed, or hopeless 0   Total Score 0       Fall Risk Screen:  STEADI Fall Risk Assessment has not been completed.    Health Habits and Functional and Cognitive Screening:  Functional & Cognitive Status 6/29/2017   Do you have difficulty preparing food and eating? No   Do you have difficulty bathing yourself, getting dressed or grooming yourself? No   Do you have difficulty using the toilet? No   Do you have difficulty moving around from place to place? Yes   Current Diet Frequent Junk Food   Dental Exam Not up to date   Eye Exam Up to date   Exercise (times per week) 2 times per week   Current Exercise Activities Include Walking   Do you need help using the phone?  No   Are you deaf or do you have serious difficulty hearing?  No   Do you need help with transportation? No   Do you need help shopping? No   Do you need help preparing meals?  No   Do you need help with housework?  No   Do you need help with laundry? No   Do you need help taking your medications? No   Do you need help managing money? No          Does the patient have evidence of cognitive impairment? No    Asprin use counseling:Does not need ASA (and currently is not on it)    Age-appropriate Screening Schedule:  Refer to the list below for future screening recommendations based on patient's age, sex and/or medical conditions. Orders for these recommended tests are listed in the plan section. The patient has been provided with a written plan.    Health Maintenance   Topic Date Due   • ZOSTER VACCINE (1 of 2) 09/17/2020 (Originally 6/4/1995)   • DXA SCAN  09/25/2021   • MAMMOGRAM  10/07/2021   • COLONOSCOPY  06/29/2027   • TDAP/TD VACCINES (2 - Td) 06/29/2027   • INFLUENZA VACCINE  Completed          The following portions of the patient's history were reviewed and updated as appropriate: allergies, current medications, past family history, past medical history, past social history, past surgical history and problem list.    Outpatient Medications Prior to Visit   Medication Sig Dispense Refill   • cetirizine (zyrTEC) 10 MG tablet Take 1 tablet by mouth Daily. 30 tablet 11   • Cyanocobalamin (VITAMIN B-12 ER) 1500 MCG tablet controlled-release Take 1 tablet by mouth Daily.     • denosumab (PROLIA) 60 MG/ML solution syringe Inject 60 mg under the skin 1 (One) Time.     • divalproex (DEPAKOTE) 500 MG 24 hr tablet Take 1 tablet by mouth Daily. For headache prevention 90 tablet 3   • fluocinonide (LIDEX) 0.05 % external solution 1 -2 gtts in ears qhs prn itch 20 mL 5   • fluticasone (FLONASE) 50 MCG/ACT nasal spray 2 sprays into the nostril(s) as directed by provider Daily. 1 bottle 11   • hydroxychloroquine (PLAQUENIL) 200 MG tablet Take 1 tablet by mouth Daily. 30 tablet 2   • meclizine (ANTIVERT) 25 MG tablet TAKE 1 TABLET BY MOUTH THREE TIMES DAILY AS NEEDED FOR DIZZINESS 30 tablet 0   • pantoprazole (PROTONIX) 40 MG EC tablet Take 1 tablet by mouth Daily. 90 tablet 1   • amLODIPine (NORVASC) 2.5 MG tablet TAKE 1 TABLET BY MOUTH ONCE DAILY 90 tablet  "0   • gabapentin (NEURONTIN) 400 MG capsule 1qhs 90 capsule 2   • sucralfate (CARAFATE) 1 g tablet Take 1 tablet by mouth 4 (Four) Times a Day. For stomach till symptoms gone 120 tablet 3     No facility-administered medications prior to visit.        Patient Active Problem List   Diagnosis   • Essential hypertension   • Neuropathy   • Vertigo   • Systemic lupus erythematosus (CMS/[facility])   • Borderline glaucoma   • Nuclear cataract   • Long-term use of hydroxychloroquine   • Osteoporosis, unspecified   • Tobacco use   • Gastroesophageal reflux disease   • Dysphagia   • Esophageal dysmotilities   • Migraine syndrome       Advanced Care Planning:  ACP discussion was held with the patient during this visit. Patient does not have an advance directive, information provided.    Review of Systems    Compared to one year ago, the patient feels her physical health is the same.  Compared to one year ago, the patient feels her mental health is the same.    Reviewed chart for potential of high risk medication in the elderly: yes  Reviewed chart for potential of harmful drug interactions in the elderly:yes    Objective         Vitals:    03/11/20 0836   BP: 118/70   BP Location: Left arm   Patient Position: Sitting   Cuff Size: Large Adult   Weight: 45.7 kg (100 lb 12.8 oz)   Height: 157.5 cm (62\")   PainSc:   4   PainLoc: Back       Body mass index is 18.44 kg/m².  Discussed the patient's BMI with her. The BMI is below average; no BMI management plan is appropriate.    Physical Exam          Assessment/Plan   Medicare Risks and Personalized Health Plan  CMS Preventative Services Quick Reference  Advance Directive Discussion  Chronic Pain   Fall Risk  Tobacco Use/Dependance (use dotphrase .tobaccocessation for documentation)    The above risks/problems have been discussed with the patient.  Pertinent information has been shared with the patient in the After Visit Summary.  Follow up plans and orders are seen below in the " Assessment/Plan Section.    Diagnoses and all orders for this visit:    1. Esophageal dysmotilities (Primary)    2. Neuropathy  -     gabapentin (NEURONTIN) 800 MG tablet; 1 nightly for feet new dosing  Dispense: 90 tablet; Refill: 2    3. Osteoporosis, unspecified osteoporosis type, unspecified pathological fracture presence    4. Systemic lupus erythematosus, unspecified SLE type, unspecified organ involvement status (CMS/[facility])    5. Tobacco use    6. Advance care planning    7. Medicare annual wellness visit, initial    8. Essential hypertension  -     amLODIPine (NORVASC) 2.5 MG tablet; Take 1 tablet by mouth Daily.  Dispense: 90 tablet; Refill: 3      Follow Up:  No follow-ups on file.     An After Visit Summary and PPPS were given to the patient.     See previous note

## 2020-04-07 RX ORDER — FLUTICASONE PROPIONATE 50 MCG
SPRAY, SUSPENSION (ML) NASAL
Qty: 16 G | Refills: 0 | Status: SHIPPED | OUTPATIENT
Start: 2020-04-07 | End: 2021-04-20

## 2020-05-28 ENCOUNTER — TELEPHONE (OUTPATIENT)
Dept: FAMILY MEDICINE CLINIC | Facility: CLINIC | Age: 75
End: 2020-05-28

## 2020-05-28 ENCOUNTER — TELEPHONE (OUTPATIENT)
Dept: ONCOLOGY | Facility: CLINIC | Age: 75
End: 2020-05-28

## 2020-05-28 NOTE — TELEPHONE ENCOUNTER
Caller-Tuyet Canada with Dr. ruby office at clinic    Pt-Mary Anne Benz called stating Dr Ruby  needs to know how much, how often and how to order it the Prolia for the patient.    For further questions/details contact Tuyet At [phone]

## 2020-05-29 DIAGNOSIS — M81.0 OSTEOPOROSIS, UNSPECIFIED OSTEOPOROSIS TYPE, UNSPECIFIED PATHOLOGICAL FRACTURE PRESENCE: Primary | Chronic | ICD-10-CM

## 2020-06-03 DIAGNOSIS — M81.0 OSTEOPOROSIS, UNSPECIFIED OSTEOPOROSIS TYPE, UNSPECIFIED PATHOLOGICAL FRACTURE PRESENCE: Primary | ICD-10-CM

## 2020-06-04 ENCOUNTER — LAB (OUTPATIENT)
Dept: ONCOLOGY | Facility: HOSPITAL | Age: 75
End: 2020-06-04

## 2020-06-04 ENCOUNTER — INFUSION (OUTPATIENT)
Dept: ONCOLOGY | Facility: HOSPITAL | Age: 75
End: 2020-06-04

## 2020-06-04 VITALS — RESPIRATION RATE: 18 BRPM

## 2020-06-04 DIAGNOSIS — M81.0 OSTEOPOROSIS, UNSPECIFIED OSTEOPOROSIS TYPE, UNSPECIFIED PATHOLOGICAL FRACTURE PRESENCE: ICD-10-CM

## 2020-06-04 DIAGNOSIS — M81.0 OSTEOPOROSIS, UNSPECIFIED OSTEOPOROSIS TYPE, UNSPECIFIED PATHOLOGICAL FRACTURE PRESENCE: Primary | ICD-10-CM

## 2020-06-04 LAB
CALCIUM SPEC-SCNC: 9.1 MG/DL (ref 8.6–10.5)
MAGNESIUM SERPL-MCNC: 2 MG/DL (ref 1.6–2.4)
PHOSPHATE SERPL-MCNC: 4.2 MG/DL (ref 2.5–4.5)

## 2020-06-04 PROCEDURE — 82310 ASSAY OF CALCIUM: CPT | Performed by: FAMILY MEDICINE

## 2020-06-04 PROCEDURE — 36415 COLL VENOUS BLD VENIPUNCTURE: CPT | Performed by: NURSE PRACTITIONER

## 2020-06-04 PROCEDURE — 83735 ASSAY OF MAGNESIUM: CPT | Performed by: FAMILY MEDICINE

## 2020-06-04 PROCEDURE — 96372 THER/PROPH/DIAG INJ SC/IM: CPT | Performed by: NURSE PRACTITIONER

## 2020-06-04 PROCEDURE — 84100 ASSAY OF PHOSPHORUS: CPT | Performed by: FAMILY MEDICINE

## 2020-06-04 PROCEDURE — 25010000002 DENOSUMAB 60 MG/ML SOLUTION PREFILLED SYRINGE: Performed by: NURSE PRACTITIONER

## 2020-06-04 RX ADMIN — DENOSUMAB 60 MG: 60 INJECTION SUBCUTANEOUS at 11:06

## 2020-06-06 DIAGNOSIS — K21.9 GASTROESOPHAGEAL REFLUX DISEASE, ESOPHAGITIS PRESENCE NOT SPECIFIED: Chronic | ICD-10-CM

## 2020-06-08 RX ORDER — PANTOPRAZOLE SODIUM 40 MG/1
TABLET, DELAYED RELEASE ORAL
Qty: 90 TABLET | Refills: 0 | Status: SHIPPED | OUTPATIENT
Start: 2020-06-08 | End: 2020-09-08

## 2020-08-11 DIAGNOSIS — R42 VERTIGO: ICD-10-CM

## 2020-08-11 RX ORDER — MECLIZINE HYDROCHLORIDE 25 MG/1
TABLET ORAL
Qty: 30 TABLET | Refills: 0 | Status: SHIPPED | OUTPATIENT
Start: 2020-08-11 | End: 2020-09-08

## 2020-09-07 DIAGNOSIS — R42 VERTIGO: ICD-10-CM

## 2020-09-07 DIAGNOSIS — K21.9 GASTROESOPHAGEAL REFLUX DISEASE, ESOPHAGITIS PRESENCE NOT SPECIFIED: Chronic | ICD-10-CM

## 2020-09-08 RX ORDER — PANTOPRAZOLE SODIUM 40 MG/1
TABLET, DELAYED RELEASE ORAL
Qty: 90 TABLET | Refills: 0 | Status: SHIPPED | OUTPATIENT
Start: 2020-09-08 | End: 2021-04-20

## 2020-09-08 RX ORDER — MECLIZINE HYDROCHLORIDE 25 MG/1
TABLET ORAL
Qty: 30 TABLET | Refills: 0 | Status: SHIPPED | OUTPATIENT
Start: 2020-09-08 | End: 2020-11-10

## 2020-09-11 ENCOUNTER — OFFICE VISIT (OUTPATIENT)
Dept: FAMILY MEDICINE CLINIC | Facility: CLINIC | Age: 75
End: 2020-09-11

## 2020-09-11 VITALS
SYSTOLIC BLOOD PRESSURE: 118 MMHG | BODY MASS INDEX: 18.14 KG/M2 | DIASTOLIC BLOOD PRESSURE: 70 MMHG | HEIGHT: 62 IN | WEIGHT: 98.6 LBS

## 2020-09-11 DIAGNOSIS — I10 ESSENTIAL HYPERTENSION: Chronic | ICD-10-CM

## 2020-09-11 DIAGNOSIS — Z72.0 TOBACCO USE: Chronic | ICD-10-CM

## 2020-09-11 DIAGNOSIS — G62.9 NEUROPATHY: Chronic | ICD-10-CM

## 2020-09-11 DIAGNOSIS — M81.0 OSTEOPOROSIS, UNSPECIFIED OSTEOPOROSIS TYPE, UNSPECIFIED PATHOLOGICAL FRACTURE PRESENCE: Chronic | ICD-10-CM

## 2020-09-11 DIAGNOSIS — Z72.0 TOBACCO USE: Primary | Chronic | ICD-10-CM

## 2020-09-11 DIAGNOSIS — Z11.59 ENCOUNTER FOR HEPATITIS C SCREENING TEST FOR LOW RISK PATIENT: ICD-10-CM

## 2020-09-11 DIAGNOSIS — Z12.31 BREAST CANCER SCREENING BY MAMMOGRAM: ICD-10-CM

## 2020-09-11 DIAGNOSIS — M32.9 SYSTEMIC LUPUS ERYTHEMATOSUS, UNSPECIFIED SLE TYPE, UNSPECIFIED ORGAN INVOLVEMENT STATUS (HCC): Primary | Chronic | ICD-10-CM

## 2020-09-11 PROBLEM — Z79.899 LONG-TERM USE OF HYDROXYCHLOROQUINE: Chronic | Status: ACTIVE | Noted: 2017-04-12

## 2020-09-11 PROCEDURE — 99214 OFFICE O/P EST MOD 30 MIN: CPT | Performed by: FAMILY MEDICINE

## 2020-09-11 RX ORDER — FLUOCINONIDE TOPICAL SOLUTION USP, 0.05% 0.5 MG/ML
SOLUTION TOPICAL
Qty: 20 ML | Refills: 5 | Status: SHIPPED | OUTPATIENT
Start: 2020-09-11 | End: 2021-04-20

## 2020-09-11 NOTE — PROGRESS NOTES
Subjective   Mary Anne Wolf is a 75 y.o. female.  Evaluation tobacco abuse lupus mild hypertension osteoporosis the diagnoses listed below.  In the interim her lupus remains acting stable seeing rheumatology on a regular basis.  Still on Plaquenil.  Last bone density revealed osteoporosis and switch to just osteopenia.  Continues on Prolia.  I will probably stop after a 5-year run.  Is having some issues with itchy ears like she did last year refill medication.  Rheumatologist is requesting some form of lab test but worried that she is written down does not really exist so we have asked him to fax us a request.  Overall stable except for the smoking.    History of Present Illness   HPI    The following portions of the patient's history were reviewed and updated as appropriate: allergies, current medications, past family history, past medical history, past social history, past surgical history and problem list.    Review of Systems  Review of Systems   Constitutional: Negative for activity change, appetite change, fatigue and unexpected weight change.   HENT: Negative for trouble swallowing and voice change.    Eyes: Negative for redness and visual disturbance.   Respiratory: Negative for cough and wheezing.    Cardiovascular: Negative for chest pain and palpitations.   Gastrointestinal: Positive for diarrhea (Intermittent related to Plaquenil use). Negative for abdominal pain, constipation, nausea and vomiting.   Genitourinary: Negative for urgency.   Musculoskeletal: Positive for arthralgias (Stable). Negative for joint swelling.   Skin: Positive for rash (Mild ear eczema stable).   Neurological: Negative for syncope and headaches.   Hematological: Negative for adenopathy.   Psychiatric/Behavioral: Negative for sleep disturbance.       Objective   Physical Exam  Physical Exam   Constitutional: She is oriented to person, place, and time. She appears well-developed.   HENT:   Head: Normocephalic.   Eyes: Pupils  "are equal, round, and reactive to light.   Neck: Normal range of motion. No thyromegaly present.   Cardiovascular: Normal rate, regular rhythm, normal heart sounds and intact distal pulses. Exam reveals no gallop and no friction rub.   No murmur heard.  Pulmonary/Chest: Breath sounds normal.   Abdominal: Soft. She exhibits no distension and no mass. There is no tenderness.   Musculoskeletal: Normal range of motion.   No peripheral edema no joint swelling get up and go 3 to 5 seconds does use a cane but walks well.   Neurological: She is alert and oriented to person, place, and time. She has normal reflexes.   Skin: Skin is warm and dry.   Psychiatric: She has a normal mood and affect. Her speech is normal and behavior is normal.   Mild hard of hearing         Visit Vitals  /70   Ht 157.5 cm (62\")   Wt 44.7 kg (98 lb 9.6 oz)   LMP  (LMP Unknown)   BMI 18.03 kg/m²     Body mass index is 18.03 kg/m².      Assessment/Plan   Mary Anne was seen today for hypertension.    Diagnoses and all orders for this visit:    Systemic lupus erythematosus, unspecified SLE type, unspecified organ involvement status (CMS/HCC)    Neuropathy    Essential hypertension    Tobacco use    Breast cancer screening by mammogram  -     Mammo Screening Digital Tomosynthesis Bilateral With CAD; Future    Encounter for hepatitis C screening test for low risk patient  -     Hepatitis C Antibody; Future    Osteoporosis, unspecified osteoporosis type, unspecified pathological fracture presence    Other orders  -     fluocinonide (LIDEX) 0.05 % external solution; 1 -2 gtts in ears qhs prn itch     on stopping smoking.  3-10m     Counseled on infection prevention flu vaccine when available lifestyle measures discussed following up with all subspecialist.  Orders as above recheck 6 months  "

## 2020-11-10 DIAGNOSIS — R42 VERTIGO: ICD-10-CM

## 2020-11-10 RX ORDER — MECLIZINE HYDROCHLORIDE 25 MG/1
TABLET ORAL
Qty: 30 TABLET | Refills: 0 | Status: SHIPPED | OUTPATIENT
Start: 2020-11-10 | End: 2020-12-04 | Stop reason: SDUPTHER

## 2020-12-04 DIAGNOSIS — G62.9 NEUROPATHY: Chronic | ICD-10-CM

## 2020-12-04 DIAGNOSIS — R42 VERTIGO: ICD-10-CM

## 2020-12-04 RX ORDER — GABAPENTIN 800 MG/1
TABLET ORAL
Qty: 90 TABLET | Refills: 2 | Status: SHIPPED | OUTPATIENT
Start: 2020-12-04 | End: 2021-04-20

## 2020-12-04 RX ORDER — MECLIZINE HYDROCHLORIDE 25 MG/1
25 TABLET ORAL 3 TIMES DAILY PRN
Qty: 30 TABLET | Refills: 5 | Status: SHIPPED | OUTPATIENT
Start: 2020-12-04 | End: 2021-12-15 | Stop reason: SDUPTHER

## 2020-12-16 ENCOUNTER — LAB (OUTPATIENT)
Dept: LAB | Facility: HOSPITAL | Age: 75
End: 2020-12-16

## 2020-12-16 ENCOUNTER — APPOINTMENT (OUTPATIENT)
Dept: ONCOLOGY | Facility: HOSPITAL | Age: 75
End: 2020-12-16

## 2020-12-16 DIAGNOSIS — Z11.59 ENCOUNTER FOR HEPATITIS C SCREENING TEST FOR LOW RISK PATIENT: ICD-10-CM

## 2020-12-16 DIAGNOSIS — M81.0 OSTEOPOROSIS, UNSPECIFIED OSTEOPOROSIS TYPE, UNSPECIFIED PATHOLOGICAL FRACTURE PRESENCE: ICD-10-CM

## 2020-12-16 LAB
CALCIUM SPEC-SCNC: 10 MG/DL (ref 8.6–10.5)
HCV AB SER DONR QL: NORMAL
MAGNESIUM SERPL-MCNC: 2.1 MG/DL (ref 1.6–2.4)
PHOSPHATE SERPL-MCNC: 4.1 MG/DL (ref 2.5–4.5)

## 2020-12-16 PROCEDURE — 36415 COLL VENOUS BLD VENIPUNCTURE: CPT

## 2020-12-16 PROCEDURE — 82310 ASSAY OF CALCIUM: CPT

## 2020-12-16 PROCEDURE — 86803 HEPATITIS C AB TEST: CPT

## 2020-12-16 PROCEDURE — 84100 ASSAY OF PHOSPHORUS: CPT

## 2020-12-16 PROCEDURE — 83735 ASSAY OF MAGNESIUM: CPT

## 2020-12-21 ENCOUNTER — INFUSION (OUTPATIENT)
Dept: ONCOLOGY | Facility: HOSPITAL | Age: 75
End: 2020-12-21

## 2020-12-21 VITALS
SYSTOLIC BLOOD PRESSURE: 123 MMHG | RESPIRATION RATE: 20 BRPM | DIASTOLIC BLOOD PRESSURE: 71 MMHG | TEMPERATURE: 98.5 F | HEART RATE: 82 BPM

## 2020-12-21 DIAGNOSIS — M81.0 OSTEOPOROSIS, UNSPECIFIED OSTEOPOROSIS TYPE, UNSPECIFIED PATHOLOGICAL FRACTURE PRESENCE: Primary | ICD-10-CM

## 2020-12-21 PROCEDURE — 25010000002 DENOSUMAB 60 MG/ML SOLUTION PREFILLED SYRINGE: Performed by: NURSE PRACTITIONER

## 2020-12-21 PROCEDURE — 96372 THER/PROPH/DIAG INJ SC/IM: CPT | Performed by: NURSE PRACTITIONER

## 2020-12-21 RX ADMIN — DENOSUMAB 60 MG: 60 INJECTION SUBCUTANEOUS at 14:51

## 2020-12-29 DIAGNOSIS — G43.909 MIGRAINE SYNDROME: ICD-10-CM

## 2020-12-29 RX ORDER — DIVALPROEX SODIUM 500 MG/1
TABLET, EXTENDED RELEASE ORAL
Qty: 90 TABLET | Refills: 3 | Status: SHIPPED | OUTPATIENT
Start: 2020-12-29 | End: 2021-04-20

## 2021-01-20 ENCOUNTER — APPOINTMENT (OUTPATIENT)
Dept: VACCINE CLINIC | Facility: HOSPITAL | Age: 76
End: 2021-01-20

## 2021-01-21 ENCOUNTER — IMMUNIZATION (OUTPATIENT)
Dept: VACCINE CLINIC | Facility: HOSPITAL | Age: 76
End: 2021-01-21

## 2021-01-21 PROCEDURE — 91300 HC SARSCOV02 VAC 30MCG/0.3ML IM: CPT | Performed by: THORACIC SURGERY (CARDIOTHORACIC VASCULAR SURGERY)

## 2021-01-21 PROCEDURE — 0001A: CPT | Performed by: THORACIC SURGERY (CARDIOTHORACIC VASCULAR SURGERY)

## 2021-02-10 ENCOUNTER — APPOINTMENT (OUTPATIENT)
Dept: VACCINE CLINIC | Facility: HOSPITAL | Age: 76
End: 2021-02-10

## 2021-02-11 ENCOUNTER — IMMUNIZATION (OUTPATIENT)
Dept: VACCINE CLINIC | Facility: HOSPITAL | Age: 76
End: 2021-02-11

## 2021-02-11 PROCEDURE — 91300 HC SARSCOV02 VAC 30MCG/0.3ML IM: CPT | Performed by: THORACIC SURGERY (CARDIOTHORACIC VASCULAR SURGERY)

## 2021-02-11 PROCEDURE — 0002A: CPT | Performed by: THORACIC SURGERY (CARDIOTHORACIC VASCULAR SURGERY)

## 2021-03-12 ENCOUNTER — OFFICE VISIT (OUTPATIENT)
Dept: FAMILY MEDICINE CLINIC | Facility: CLINIC | Age: 76
End: 2021-03-12

## 2021-03-12 VITALS
SYSTOLIC BLOOD PRESSURE: 118 MMHG | DIASTOLIC BLOOD PRESSURE: 70 MMHG | WEIGHT: 97.1 LBS | HEIGHT: 62 IN | BODY MASS INDEX: 17.87 KG/M2

## 2021-03-12 DIAGNOSIS — Z72.0 TOBACCO USE: Chronic | ICD-10-CM

## 2021-03-12 DIAGNOSIS — I10 ESSENTIAL HYPERTENSION: Primary | Chronic | ICD-10-CM

## 2021-03-12 DIAGNOSIS — M81.0 OSTEOPOROSIS, UNSPECIFIED OSTEOPOROSIS TYPE, UNSPECIFIED PATHOLOGICAL FRACTURE PRESENCE: Chronic | ICD-10-CM

## 2021-03-12 DIAGNOSIS — M32.9 SYSTEMIC LUPUS ERYTHEMATOSUS, UNSPECIFIED SLE TYPE, UNSPECIFIED ORGAN INVOLVEMENT STATUS (HCC): Chronic | ICD-10-CM

## 2021-03-12 DIAGNOSIS — Z00.00 MEDICARE ANNUAL WELLNESS VISIT, SUBSEQUENT: ICD-10-CM

## 2021-03-12 PROBLEM — E85.4 AMYLOIDOSIS OF SKIN: Status: RESOLVED | Noted: 2021-03-12 | Resolved: 2021-03-12

## 2021-03-12 PROBLEM — L99 AMYLOIDOSIS OF SKIN: Status: ACTIVE | Noted: 2021-03-12

## 2021-03-12 PROBLEM — E85.4 AMYLOIDOSIS OF SKIN: Status: ACTIVE | Noted: 2021-03-12

## 2021-03-12 PROBLEM — L99 AMYLOIDOSIS OF SKIN (HCC): Status: RESOLVED | Noted: 2021-03-12 | Resolved: 2021-03-12

## 2021-03-12 PROCEDURE — 1126F AMNT PAIN NOTED NONE PRSNT: CPT | Performed by: FAMILY MEDICINE

## 2021-03-12 PROCEDURE — 99214 OFFICE O/P EST MOD 30 MIN: CPT | Performed by: FAMILY MEDICINE

## 2021-03-12 PROCEDURE — 1159F MED LIST DOCD IN RCRD: CPT | Performed by: FAMILY MEDICINE

## 2021-03-12 PROCEDURE — G0439 PPPS, SUBSEQ VISIT: HCPCS | Performed by: FAMILY MEDICINE

## 2021-03-12 RX ORDER — AMLODIPINE BESYLATE 2.5 MG/1
2.5 TABLET ORAL DAILY
Qty: 90 TABLET | Refills: 3 | Status: SHIPPED | OUTPATIENT
Start: 2021-03-12 | End: 2022-05-31

## 2021-03-12 NOTE — PROGRESS NOTES
The ABCs of the Annual Wellness Visit  Subsequent Medicare Wellness Visit    Chief Complaint   Patient presents with   • Hypertension     6 month reval       Subjective   History of Present Illness:  Mary Anne Wolf is a 75 y.o. female who presents for a Subsequent Medicare Wellness Visit.    HEALTH RISK ASSESSMENT    Recent Hospitalizations:  No hospitalization(s) within the last year.    Current Medical Providers:  Patient Care Team:  Errol Ruby MD as PCP - General (Family Medicine)    Smoking Status:  Social History     Tobacco Use   Smoking Status Current Every Day Smoker   • Packs/day: 0.25   • Types: Cigarettes   Smokeless Tobacco Never Used   Tobacco Comment    2 a day       Alcohol Consumption:  Social History     Substance and Sexual Activity   Alcohol Use No       Depression Screen:   PHQ-2/PHQ-9 Depression Screening 3/12/2021   Little interest or pleasure in doing things 0   Feeling down, depressed, or hopeless 0   Total Score 0       Fall Risk Screen:  TRAV Fall Risk Assessment was completed, and patient is at MODERATE risk for falls. Assessment completed on:3/12/2021    Health Habits and Functional and Cognitive Screening:  Functional & Cognitive Status 6/29/2017   Do you have difficulty preparing food and eating? No   Do you have difficulty bathing yourself, getting dressed or grooming yourself? No   Do you have difficulty using the toilet? No   Do you have difficulty moving around from place to place? Yes   Current Diet Frequent Junk Food   Dental Exam Not up to date   Eye Exam Up to date        Eye Exam Comment April 2017   Exercise (times per week) 2 times per week        Exercise Frequency Comment does some yard work    Current Exercise Activities Include Walking   Do you need help using the phone?  No   Are you deaf or do you have serious difficulty hearing?  No   Do you need help with transportation? No   Do you need help shopping? No   Do you need help preparing meals?  No   Do you need  help with housework?  No   Do you need help with laundry? No   Do you need help taking your medications? No   Do you need help managing money? No         Does the patient have evidence of cognitive impairment? No    Asprin use counseling:Does not need ASA (and currently is not on it)    Age-appropriate Screening Schedule:  Refer to the list below for future screening recommendations based on patient's age, sex and/or medical conditions. Orders for these recommended tests are listed in the plan section. The patient has been provided with a written plan.    Health Maintenance   Topic Date Due   • DXA SCAN  09/25/2021   • MAMMOGRAM  10/08/2022   • COLONOSCOPY  06/29/2027   • TDAP/TD VACCINES (2 - Td) 06/29/2027   • INFLUENZA VACCINE  Completed   • ZOSTER VACCINE  Discontinued          The following portions of the patient's history were reviewed and updated as appropriate: allergies, current medications, past family history, past medical history, past social history, past surgical history and problem list.    Outpatient Medications Prior to Visit   Medication Sig Dispense Refill   • cetirizine (zyrTEC) 10 MG tablet Take 1 tablet by mouth Daily. 30 tablet 11   • Cyanocobalamin (VITAMIN B-12 ER) 1500 MCG tablet controlled-release Take 1 tablet by mouth Daily.     • divalproex (DEPAKOTE ER) 500 MG 24 hr tablet TAKE 1 TABLET BY MOUTH ONCE DAILY FOR  HEADACHE  PREVENTION 90 tablet 3   • fluocinonide (LIDEX) 0.05 % external solution 1 -2 gtts in ears qhs prn itch 20 mL 5   • fluticasone (FLONASE) 50 MCG/ACT nasal spray USE 2 SPRAY(S) IN EACH NOSTRIL ONCE DAILY AS DIRECTED BY  PROVIDER  DAILY 16 g 0   • gabapentin (NEURONTIN) 800 MG tablet 1 nightly for feet new dosing 90 tablet 2   • hydroxychloroquine (PLAQUENIL) 200 MG tablet Take 1 tablet by mouth Daily. 30 tablet 2   • meclizine (ANTIVERT) 25 MG tablet Take 1 tablet by mouth 3 (Three) Times a Day As Needed for Dizziness. 30 tablet 5   • nicotine (NICODERM CQ) 7 MG/24HR  "patch Place 1 patch on the skin as directed by provider Daily. 14 each 5   • pantoprazole (PROTONIX) 40 MG EC tablet Take 1 tablet by mouth once daily 90 tablet 0   • amLODIPine (NORVASC) 2.5 MG tablet Take 1 tablet by mouth Daily. 90 tablet 3     No facility-administered medications prior to visit.       Patient Active Problem List   Diagnosis   • Essential hypertension   • Neuropathy   • Vertigo   • Systemic lupus erythematosus (CMS/[facility])   • Borderline glaucoma   • Nuclear cataract   • Long-term use of hydroxychloroquine   • Osteoporosis, unspecified   • Tobacco use   • Gastroesophageal reflux disease   • Dysphagia   • Esophageal dysmotilities   • Migraine syndrome       Advanced Care Planning:  ACP discussion was held with the patient during this visit. Patient has an advance directive (not in EMR), copy requested.    Review of Systems    Compared to one year ago, the patient feels her physical health is the same.  Compared to one year ago, the patient feels her mental health is the same.    Reviewed chart for potential of high risk medication in the elderly: yes  Reviewed chart for potential of harmful drug interactions in the elderly:yes    Objective         Vitals:    03/12/21 0805   BP: 118/70   Weight: 44 kg (97 lb 1.6 oz)   Height: 157.5 cm (62\")   PainSc: 0-No pain       Body mass index is 17.76 kg/m².  Discussed the patient's BMI with her. The BMI is in the acceptable range.    Physical Exam          Assessment/Plan   Medicare Risks and Personalized Health Plan  CMS Preventative Services Quick Reference  Advance Directive Discussion  Fall Risk  Immunizations Discussed/Encouraged (specific immunizations; Covid already done )  Inactivity/Sedentary  Tobacco Use/Dependance (use dotphrase .tobaccocessation for documentation)    The above risks/problems have been discussed with the patient.  Pertinent information has been shared with the patient in the After Visit Summary.  Follow up plans and orders are seen " below in the Assessment/Plan Section.    Diagnoses and all orders for this visit:    1. Essential hypertension (Primary)  -     amLODIPine (NORVASC) 2.5 MG tablet; Take 1 tablet by mouth Daily.  Dispense: 90 tablet; Refill: 3    2. Systemic lupus erythematosus, unspecified SLE type, unspecified organ involvement status (CMS/[facility])    3. Osteoporosis, unspecified osteoporosis type, unspecified pathological fracture presence    4. Tobacco use    5. Medicare annual wellness visit, subsequent      Follow Up:  No follow-ups on file.     An After Visit Summary and PPPS were given to the patient.

## 2021-03-12 NOTE — PROGRESS NOTES
Subjective   Mary Anne Wolf is a 75 y.o. female.  Reevaluation mild hypertension history of lupus osteoporosis age-related tobacco abuse diagnoses listed below.  In interim has visited with rheumatologist has kept the Plaquenil the same dosing.  Lab work was acceptable.  He needs to smoke unfortunately.  Had mammogram in October.  Arthralgias remaining about the same.  Continues on gabapentin.    History of Present Illness   HPI    The following portions of the patient's history were reviewed and updated as appropriate: allergies, current medications, past family history, past medical history, past social history, past surgical history and problem list.    Review of Systems  Review of Systems   Constitutional: Negative for activity change, appetite change, fatigue and unexpected weight change.   HENT: Negative for trouble swallowing and voice change.    Eyes: Negative for redness and visual disturbance.   Respiratory: Negative for cough and wheezing.    Cardiovascular: Negative for chest pain and palpitations.   Gastrointestinal: Negative for abdominal pain, constipation, diarrhea, nausea and vomiting.   Genitourinary: Negative for urgency.   Musculoskeletal: Positive for arthralgias. Negative for joint swelling.   Neurological: Positive for numbness (Peripheral neuropathy lupus induced most likely). Negative for syncope and headaches.   Hematological: Negative for adenopathy.   Psychiatric/Behavioral: Negative for sleep disturbance.       Objective   Physical Exam  Physical Exam  Constitutional:       Appearance: She is well-developed.   HENT:      Head: Normocephalic.   Eyes:      Pupils: Pupils are equal, round, and reactive to light.   Neck:      Thyroid: No thyromegaly.   Cardiovascular:      Rate and Rhythm: Normal rate and regular rhythm.      Heart sounds: Normal heart sounds. No murmur. No friction rub. No gallop.    Pulmonary:      Breath sounds: Normal breath sounds.   Abdominal:      General: There is  "no distension.      Palpations: Abdomen is soft. There is no mass.      Tenderness: There is no abdominal tenderness.   Musculoskeletal:         General: Normal range of motion.      Cervical back: Normal range of motion.      Comments: No peripheral edema no joint swelling no skin breakdown 1-2+ pulses get up and go 3 to 5 seconds with cane.   Skin:     General: Skin is warm and dry.   Neurological:      Mental Status: She is alert and oriented to person, place, and time.      Deep Tendon Reflexes: Reflexes are normal and symmetric.   Psychiatric:         Mood and Affect: Affect is blunt.         Speech: Speech normal.         Behavior: Behavior normal.         Thought Content: Thought content normal.         Cognition and Memory: Cognition normal.           Visit Vitals  /70   Ht 157.5 cm (62\")   Wt 44 kg (97 lb 1.6 oz)   LMP  (LMP Unknown)   BMI 17.76 kg/m²     Body mass index is 17.76 kg/m².      Assessment/Plan   Diagnoses and all orders for this visit:    1. Essential hypertension (Primary)  -     amLODIPine (NORVASC) 2.5 MG tablet; Take 1 tablet by mouth Daily.  Dispense: 90 tablet; Refill: 3    2. Systemic lupus erythematosus, unspecified SLE type, unspecified organ involvement status (CMS/[facility])    3. Osteoporosis, unspecified osteoporosis type, unspecified pathological fracture presence    4. Tobacco use    5. Medicare annual wellness visit, subsequent     on stopping smoking.  3-10m     Counseled mainly on lifestyle measures fall prevention infection prevention.  Continue medicines as outlined review labs from rheumatologist recheck 6 months  "

## 2021-04-20 ENCOUNTER — LAB (OUTPATIENT)
Dept: LAB | Facility: HOSPITAL | Age: 76
End: 2021-04-20

## 2021-04-20 DIAGNOSIS — Z01.818 PREOP TESTING: Primary | ICD-10-CM

## 2021-04-20 RX ORDER — GABAPENTIN 800 MG/1
800 TABLET ORAL NIGHTLY
COMMUNITY
End: 2021-06-07 | Stop reason: SDUPTHER

## 2021-04-20 RX ORDER — HYDROXYCHLOROQUINE SULFATE 200 MG/1
200 TABLET, FILM COATED ORAL DAILY
COMMUNITY
End: 2022-10-06

## 2021-04-20 RX ORDER — FLUOCINONIDE TOPICAL SOLUTION USP, 0.05% 0.5 MG/ML
SOLUTION TOPICAL AS NEEDED
COMMUNITY
End: 2022-03-15

## 2021-04-20 RX ORDER — FLUTICASONE PROPIONATE 50 MCG
2 SPRAY, SUSPENSION (ML) NASAL DAILY PRN
COMMUNITY

## 2021-04-20 RX ORDER — LANSOPRAZOLE 30 MG/1
30 CAPSULE, DELAYED RELEASE ORAL DAILY
COMMUNITY

## 2021-04-20 RX ORDER — DIVALPROEX SODIUM 250 MG/1
500 TABLET, EXTENDED RELEASE ORAL DAILY
COMMUNITY
End: 2021-12-15 | Stop reason: SDUPTHER

## 2021-04-21 ENCOUNTER — LAB (OUTPATIENT)
Dept: LAB | Facility: HOSPITAL | Age: 76
End: 2021-04-21

## 2021-04-21 DIAGNOSIS — Z01.818 PREOP TESTING: ICD-10-CM

## 2021-04-21 LAB — SARS-COV-2 N GENE RESP QL NAA+PROBE: NOT DETECTED

## 2021-04-21 PROCEDURE — C9803 HOPD COVID-19 SPEC COLLECT: HCPCS

## 2021-04-21 PROCEDURE — 87635 SARS-COV-2 COVID-19 AMP PRB: CPT

## 2021-04-22 RX ORDER — CYCLOPENTOLATE HYDROCHLORIDE 20 MG/ML
1 SOLUTION/ DROPS OPHTHALMIC
Status: CANCELLED | OUTPATIENT
Start: 2021-04-23 | End: 2021-04-23

## 2021-04-22 RX ORDER — PHENYLEPHRINE HCL 2.5 %
1 DROPS OPHTHALMIC (EYE)
Status: CANCELLED | OUTPATIENT
Start: 2021-04-23 | End: 2021-04-23

## 2021-04-22 RX ORDER — TETRACAINE HYDROCHLORIDE 5 MG/ML
1 SOLUTION OPHTHALMIC
Status: CANCELLED | OUTPATIENT
Start: 2021-04-23 | End: 2021-04-23

## 2021-04-23 ENCOUNTER — ANESTHESIA (OUTPATIENT)
Dept: PERIOP | Facility: HOSPITAL | Age: 76
End: 2021-04-23

## 2021-04-23 ENCOUNTER — ANESTHESIA EVENT (OUTPATIENT)
Dept: PERIOP | Facility: HOSPITAL | Age: 76
End: 2021-04-23

## 2021-04-23 ENCOUNTER — HOSPITAL ENCOUNTER (OUTPATIENT)
Facility: HOSPITAL | Age: 76
Setting detail: HOSPITAL OUTPATIENT SURGERY
Discharge: HOME | End: 2021-04-23
Attending: OPHTHALMOLOGY | Admitting: OPHTHALMOLOGY

## 2021-04-23 VITALS
HEART RATE: 70 BPM | TEMPERATURE: 98 F | BODY MASS INDEX: 16.15 KG/M2 | WEIGHT: 94.58 LBS | SYSTOLIC BLOOD PRESSURE: 116 MMHG | RESPIRATION RATE: 16 BRPM | OXYGEN SATURATION: 96 % | HEIGHT: 64 IN | DIASTOLIC BLOOD PRESSURE: 55 MMHG

## 2021-04-23 PROCEDURE — 25010000002 MIDAZOLAM PER 1 MG: Performed by: NURSE ANESTHETIST, CERTIFIED REGISTERED

## 2021-04-23 PROCEDURE — 25010000002 EPINEPHRINE PER 0.1 MG: Performed by: OPHTHALMOLOGY

## 2021-04-23 PROCEDURE — V2632 POST CHMBR INTRAOCULAR LENS: HCPCS | Performed by: OPHTHALMOLOGY

## 2021-04-23 PROCEDURE — 25010000002 FENTANYL CITRATE (PF) 100 MCG/2ML SOLUTION: Performed by: NURSE ANESTHETIST, CERTIFIED REGISTERED

## 2021-04-23 PROCEDURE — 25010000002 VANCOMYCIN 1 G RECONSTITUTED SOLUTION 1 EACH VIAL: Performed by: OPHTHALMOLOGY

## 2021-04-23 DEVICE — LENS ACRYSOF IQ 6X13MM SN60WF 22.0: Type: IMPLANTABLE DEVICE | Site: EYE | Status: FUNCTIONAL

## 2021-04-23 RX ORDER — MEPERIDINE HYDROCHLORIDE 25 MG/ML
12.5 INJECTION INTRAMUSCULAR; INTRAVENOUS; SUBCUTANEOUS
Status: DISCONTINUED | OUTPATIENT
Start: 2021-04-23 | End: 2021-04-23 | Stop reason: HOSPADM

## 2021-04-23 RX ORDER — TETRACAINE HYDROCHLORIDE 5 MG/ML
SOLUTION OPHTHALMIC AS NEEDED
Status: DISCONTINUED | OUTPATIENT
Start: 2021-04-23 | End: 2021-04-23 | Stop reason: HOSPADM

## 2021-04-23 RX ORDER — FENTANYL CITRATE 50 UG/ML
INJECTION, SOLUTION INTRAMUSCULAR; INTRAVENOUS AS NEEDED
Status: DISCONTINUED | OUTPATIENT
Start: 2021-04-23 | End: 2021-04-23 | Stop reason: SURG

## 2021-04-23 RX ORDER — PHENYLEPHRINE HCL 2.5 %
1 DROPS OPHTHALMIC (EYE)
Status: COMPLETED | OUTPATIENT
Start: 2021-04-23 | End: 2021-04-23

## 2021-04-23 RX ORDER — TETRACAINE HYDROCHLORIDE 5 MG/ML
1 SOLUTION OPHTHALMIC
Status: COMPLETED | OUTPATIENT
Start: 2021-04-23 | End: 2021-04-23

## 2021-04-23 RX ORDER — PREDNISOLONE ACETATE 10 MG/ML
SUSPENSION/ DROPS OPHTHALMIC AS NEEDED
Status: DISCONTINUED | OUTPATIENT
Start: 2021-04-23 | End: 2021-04-23 | Stop reason: HOSPADM

## 2021-04-23 RX ORDER — PROMETHAZINE HYDROCHLORIDE 25 MG/1
25 TABLET ORAL ONCE AS NEEDED
Status: DISCONTINUED | OUTPATIENT
Start: 2021-04-23 | End: 2021-04-23 | Stop reason: HOSPADM

## 2021-04-23 RX ORDER — MIDAZOLAM HYDROCHLORIDE 1 MG/ML
INJECTION INTRAMUSCULAR; INTRAVENOUS AS NEEDED
Status: DISCONTINUED | OUTPATIENT
Start: 2021-04-23 | End: 2021-04-23 | Stop reason: SURG

## 2021-04-23 RX ORDER — ONDANSETRON 2 MG/ML
4 INJECTION INTRAMUSCULAR; INTRAVENOUS ONCE AS NEEDED
Status: DISCONTINUED | OUTPATIENT
Start: 2021-04-23 | End: 2021-04-23 | Stop reason: HOSPADM

## 2021-04-23 RX ORDER — CYCLOPENTOLATE HYDROCHLORIDE 20 MG/ML
1 SOLUTION/ DROPS OPHTHALMIC
Status: COMPLETED | OUTPATIENT
Start: 2021-04-23 | End: 2021-04-23

## 2021-04-23 RX ORDER — MOXIFLOXACIN 5 MG/ML
SOLUTION/ DROPS OPHTHALMIC AS NEEDED
Status: DISCONTINUED | OUTPATIENT
Start: 2021-04-23 | End: 2021-04-23 | Stop reason: HOSPADM

## 2021-04-23 RX ORDER — SODIUM CHLORIDE 0.9 % (FLUSH) 0.9 %
10 SYRINGE (ML) INJECTION AS NEEDED
Status: DISCONTINUED | OUTPATIENT
Start: 2021-04-23 | End: 2021-04-23 | Stop reason: HOSPADM

## 2021-04-23 RX ORDER — PROMETHAZINE HYDROCHLORIDE 25 MG/1
25 SUPPOSITORY RECTAL ONCE AS NEEDED
Status: DISCONTINUED | OUTPATIENT
Start: 2021-04-23 | End: 2021-04-23 | Stop reason: HOSPADM

## 2021-04-23 RX ORDER — BRIMONIDINE TARTRATE 0.15 %
DROPS OPHTHALMIC (EYE) AS NEEDED
Status: DISCONTINUED | OUTPATIENT
Start: 2021-04-23 | End: 2021-04-23 | Stop reason: HOSPADM

## 2021-04-23 RX ADMIN — CYCLOPENTOLATE HYDROCHLORIDE 1 DROP: 20 SOLUTION/ DROPS OPHTHALMIC at 10:20

## 2021-04-23 RX ADMIN — SODIUM CHLORIDE, PRESERVATIVE FREE 10 ML: 5 INJECTION INTRAVENOUS at 10:18

## 2021-04-23 RX ADMIN — FENTANYL CITRATE 25 MCG: 50 INJECTION INTRAMUSCULAR; INTRAVENOUS at 11:23

## 2021-04-23 RX ADMIN — PHENYLEPHRINE HYDROCHLORIDE 1 DROP: 25 SOLUTION/ DROPS OPHTHALMIC at 10:20

## 2021-04-23 RX ADMIN — TETRACAINE HYDROCHLORIDE 1 DROP: 5 SOLUTION OPHTHALMIC at 10:30

## 2021-04-23 RX ADMIN — PHENYLEPHRINE HYDROCHLORIDE 1 DROP: 25 SOLUTION/ DROPS OPHTHALMIC at 10:30

## 2021-04-23 RX ADMIN — MIDAZOLAM HYDROCHLORIDE 1 MG: 2 INJECTION, SOLUTION INTRAMUSCULAR; INTRAVENOUS at 11:15

## 2021-04-23 RX ADMIN — CYCLOPENTOLATE HYDROCHLORIDE 1 DROP: 20 SOLUTION/ DROPS OPHTHALMIC at 10:10

## 2021-04-23 RX ADMIN — CYCLOPENTOLATE HYDROCHLORIDE 1 DROP: 20 SOLUTION/ DROPS OPHTHALMIC at 10:30

## 2021-04-23 RX ADMIN — PHENYLEPHRINE HYDROCHLORIDE 1 DROP: 25 SOLUTION/ DROPS OPHTHALMIC at 10:10

## 2021-04-23 RX ADMIN — TETRACAINE HYDROCHLORIDE 1 DROP: 5 SOLUTION OPHTHALMIC at 10:10

## 2021-04-23 NOTE — ANESTHESIA PREPROCEDURE EVALUATION
Anesthesia Evaluation     Patient summary reviewed and Nursing notes reviewed   NPO Solid Status: > 8 hours  NPO Liquid Status: > 8 hours           Airway   Mallampati: II  TM distance: >3 FB  Neck ROM: full  possible difficult intubation  Dental    (+) upper dentures and lower dentures    Pulmonary     breath sounds clear to auscultation  (+) a smoker Current Abstained day of surgery, COPD moderate, decreased breath sounds,     ROS comment: Two images were performed. The cardiac silhouette is within  normal limits. The lung fields are clear. No pleural effusion is  identified. No acute bony abnormality is seen. There is scoliosis  of the thoracic spine with convexity to the right. There is  osteopenia.     IMPRESSION:  CONCLUSION: No acute cardiopulmonary pathology is identified.     Electronically signed by:  Willian Beatty MD  3/2/2018 10:43  Cardiovascular - normal exam    ECG reviewed  Rhythm: regular  Rate: normal    (+) hypertension,   (-) murmur    ROS comment: Normal sinus rhythm  Low voltage QRS  Borderline ECG     Confirmed by HOA PILLAI MD ([id number]) on 10/9/2018 2:21:38 PM    Neuro/Psych  (+) seizures well controlled, headaches (Migraine.), dizziness/light headedness (Vertigo.),     GI/Hepatic/Renal/Endo    (+)  GERD well controlled,      Musculoskeletal     Abdominal    Substance History - negative use     OB/GYN negative ob/gyn ROS         Other   arthritis,                      Anesthesia Plan    ASA 3     MAC     intravenous induction     Anesthetic plan, all risks, benefits, and alternatives have been provided, discussed and informed consent has been obtained with: patient.

## 2021-04-23 NOTE — ANESTHESIA POSTPROCEDURE EVALUATION
Patient: Mary Anne Wolf    Procedure Summary     Date: 04/23/21 Room / Location: [facility] OR 06 / [facility] OR    Anesthesia Start: 1120 Anesthesia Stop: 1131    Procedure: REMOVE CATARACT AND IMPLANT INTRAOCULAR LENS (Right Eye) Diagnosis:       Age-related nuclear cataract of right eye      (Age-related nuclear cataract of right eye [H25.11])    Surgeons: Trenton Peres MD Provider: Estefany Barrera DO    Anesthesia Type: MAC ASA Status: 3          Anesthesia Type: MAC    Vitals  No vitals data found for the desired time range.          Post Anesthesia Care and Evaluation    Patient location during evaluation: PHASE II  Patient participation: complete - patient participated  Level of consciousness: awake and alert  Pain score: 0  Pain management: adequate  Airway patency: patent  Anesthetic complications: No anesthetic complications  PONV Status: none  Cardiovascular status: acceptable  Respiratory status: acceptable  Hydration status: acceptable  Post Neuraxial Block status: Motor and sensory function returned to baseline

## 2021-04-27 ENCOUNTER — LAB (OUTPATIENT)
Dept: LAB | Facility: HOSPITAL | Age: 76
End: 2021-04-27

## 2021-04-27 DIAGNOSIS — Z01.818 PREOP TESTING: Primary | ICD-10-CM

## 2021-04-27 LAB — SARS-COV-2 N GENE RESP QL NAA+PROBE: NOT DETECTED

## 2021-04-27 PROCEDURE — 87635 SARS-COV-2 COVID-19 AMP PRB: CPT

## 2021-04-27 PROCEDURE — C9803 HOPD COVID-19 SPEC COLLECT: HCPCS

## 2021-04-30 ENCOUNTER — ANESTHESIA EVENT (OUTPATIENT)
Dept: PERIOP | Facility: HOSPITAL | Age: 76
End: 2021-04-30

## 2021-04-30 ENCOUNTER — HOSPITAL ENCOUNTER (OUTPATIENT)
Facility: HOSPITAL | Age: 76
Setting detail: HOSPITAL OUTPATIENT SURGERY
Discharge: HOME | End: 2021-04-30
Attending: OPHTHALMOLOGY | Admitting: OPHTHALMOLOGY

## 2021-04-30 ENCOUNTER — ANESTHESIA (OUTPATIENT)
Dept: PERIOP | Facility: HOSPITAL | Age: 76
End: 2021-04-30

## 2021-04-30 VITALS
DIASTOLIC BLOOD PRESSURE: 80 MMHG | HEIGHT: 65 IN | OXYGEN SATURATION: 95 % | RESPIRATION RATE: 18 BRPM | BODY MASS INDEX: 15.76 KG/M2 | SYSTOLIC BLOOD PRESSURE: 128 MMHG | WEIGHT: 94.58 LBS | HEART RATE: 64 BPM | TEMPERATURE: 97.3 F

## 2021-04-30 PROCEDURE — V2632 POST CHMBR INTRAOCULAR LENS: HCPCS | Performed by: OPHTHALMOLOGY

## 2021-04-30 PROCEDURE — 25010000002 FENTANYL CITRATE (PF) 100 MCG/2ML SOLUTION: Performed by: NURSE ANESTHETIST, CERTIFIED REGISTERED

## 2021-04-30 PROCEDURE — 25010000002 MIDAZOLAM PER 1 MG: Performed by: NURSE ANESTHETIST, CERTIFIED REGISTERED

## 2021-04-30 PROCEDURE — 25010000002 VANCOMYCIN 1 G RECONSTITUTED SOLUTION 1 EACH VIAL: Performed by: OPHTHALMOLOGY

## 2021-04-30 PROCEDURE — 25010000002 EPINEPHRINE PER 0.1 MG: Performed by: OPHTHALMOLOGY

## 2021-04-30 DEVICE — LENS ACRYSOF IQ 6X13MM SN60WF 22.5: Type: IMPLANTABLE DEVICE | Site: EYE | Status: FUNCTIONAL

## 2021-04-30 RX ORDER — CYCLOPENTOLATE HYDROCHLORIDE 20 MG/ML
1 SOLUTION/ DROPS OPHTHALMIC
Status: COMPLETED | OUTPATIENT
Start: 2021-04-30 | End: 2021-04-30

## 2021-04-30 RX ORDER — TETRACAINE HYDROCHLORIDE 5 MG/ML
SOLUTION OPHTHALMIC AS NEEDED
Status: DISCONTINUED | OUTPATIENT
Start: 2021-04-30 | End: 2021-04-30 | Stop reason: HOSPADM

## 2021-04-30 RX ORDER — MOXIFLOXACIN 5 MG/ML
SOLUTION/ DROPS OPHTHALMIC AS NEEDED
Status: DISCONTINUED | OUTPATIENT
Start: 2021-04-30 | End: 2021-04-30 | Stop reason: HOSPADM

## 2021-04-30 RX ORDER — FENTANYL CITRATE 50 UG/ML
INJECTION, SOLUTION INTRAMUSCULAR; INTRAVENOUS AS NEEDED
Status: DISCONTINUED | OUTPATIENT
Start: 2021-04-30 | End: 2021-04-30 | Stop reason: SURG

## 2021-04-30 RX ORDER — MIDAZOLAM HYDROCHLORIDE 1 MG/ML
INJECTION INTRAMUSCULAR; INTRAVENOUS AS NEEDED
Status: DISCONTINUED | OUTPATIENT
Start: 2021-04-30 | End: 2021-04-30 | Stop reason: SURG

## 2021-04-30 RX ORDER — SODIUM CHLORIDE 0.9 % (FLUSH) 0.9 %
10 SYRINGE (ML) INJECTION AS NEEDED
Status: DISCONTINUED | OUTPATIENT
Start: 2021-04-30 | End: 2021-04-30 | Stop reason: HOSPADM

## 2021-04-30 RX ORDER — PHENYLEPHRINE HCL 2.5 %
1 DROPS OPHTHALMIC (EYE)
Status: COMPLETED | OUTPATIENT
Start: 2021-04-30 | End: 2021-04-30

## 2021-04-30 RX ORDER — TETRACAINE HYDROCHLORIDE 5 MG/ML
1 SOLUTION OPHTHALMIC
Status: COMPLETED | OUTPATIENT
Start: 2021-04-30 | End: 2021-04-30

## 2021-04-30 RX ORDER — BRIMONIDINE TARTRATE 0.15 %
DROPS OPHTHALMIC (EYE) AS NEEDED
Status: DISCONTINUED | OUTPATIENT
Start: 2021-04-30 | End: 2021-04-30 | Stop reason: HOSPADM

## 2021-04-30 RX ORDER — PREDNISOLONE ACETATE 10 MG/ML
SUSPENSION/ DROPS OPHTHALMIC AS NEEDED
Status: DISCONTINUED | OUTPATIENT
Start: 2021-04-30 | End: 2021-04-30 | Stop reason: HOSPADM

## 2021-04-30 RX ADMIN — PHENYLEPHRINE HYDROCHLORIDE 1 DROP: 25 SOLUTION/ DROPS OPHTHALMIC at 08:07

## 2021-04-30 RX ADMIN — PHENYLEPHRINE HYDROCHLORIDE 1 DROP: 25 SOLUTION/ DROPS OPHTHALMIC at 08:17

## 2021-04-30 RX ADMIN — PHENYLEPHRINE HYDROCHLORIDE 1 DROP: 25 SOLUTION/ DROPS OPHTHALMIC at 07:57

## 2021-04-30 RX ADMIN — CYCLOPENTOLATE HYDROCHLORIDE 1 DROP: 20 SOLUTION/ DROPS OPHTHALMIC at 08:17

## 2021-04-30 RX ADMIN — MIDAZOLAM HYDROCHLORIDE 1 MG: 2 INJECTION, SOLUTION INTRAMUSCULAR; INTRAVENOUS at 09:39

## 2021-04-30 RX ADMIN — TETRACAINE HYDROCHLORIDE 1 DROP: 5 SOLUTION OPHTHALMIC at 08:17

## 2021-04-30 RX ADMIN — CYCLOPENTOLATE HYDROCHLORIDE 1 DROP: 20 SOLUTION/ DROPS OPHTHALMIC at 08:07

## 2021-04-30 RX ADMIN — FENTANYL CITRATE 50 MCG: 50 INJECTION INTRAMUSCULAR; INTRAVENOUS at 09:39

## 2021-04-30 RX ADMIN — TETRACAINE HYDROCHLORIDE 1 DROP: 5 SOLUTION OPHTHALMIC at 07:57

## 2021-04-30 RX ADMIN — SODIUM CHLORIDE, PRESERVATIVE FREE 10 ML: 5 INJECTION INTRAVENOUS at 08:14

## 2021-04-30 RX ADMIN — SODIUM CHLORIDE, PRESERVATIVE FREE 10 ML: 5 INJECTION INTRAVENOUS at 09:39

## 2021-04-30 RX ADMIN — CYCLOPENTOLATE HYDROCHLORIDE 1 DROP: 20 SOLUTION/ DROPS OPHTHALMIC at 07:57

## 2021-04-30 NOTE — ANESTHESIA PREPROCEDURE EVALUATION
Anesthesia Evaluation     Patient summary reviewed and Nursing notes reviewed   NPO Solid Status: > 8 hours  NPO Liquid Status: > 8 hours           Airway   Mallampati: II  TM distance: >3 FB  Neck ROM: full  possible difficult intubation  Dental    (+) upper dentures and lower dentures    Pulmonary     breath sounds clear to auscultation  (+) a smoker Current Abstained day of surgery, COPD moderate, decreased breath sounds,     ROS comment: Two images were performed. The cardiac silhouette is within  normal limits. The lung fields are clear. No pleural effusion is  identified. No acute bony abnormality is seen. There is scoliosis  of the thoracic spine with convexity to the right. There is  osteopenia.     IMPRESSION:  CONCLUSION: No acute cardiopulmonary pathology is identified.     Electronically signed by:  Willian Beatty MD  3/2/2018 10:43  Cardiovascular - normal exam  Exercise tolerance: poor (<4 METS)    ECG reviewed  Rhythm: regular  Rate: normal    (+) hypertension well controlled less than 2 medications,   (-) past MI, dysrhythmias, murmur, DVT    ROS comment: Normal sinus rhythm  Low voltage QRS  Borderline ECG     Confirmed by HOA PILLAI MD ([id number]) on 10/9/2018 2:21:38 PM    Neuro/Psych  (+) seizures well controlled, headaches (Migraine.), dizziness/light headedness (Vertigo.),     GI/Hepatic/Renal/Endo    (+)  GERD well controlled,      Musculoskeletal     Abdominal    Substance History - negative use     OB/GYN negative ob/gyn ROS         Other   arthritis,      ROS/Med Hx Other: Hx of seizures- hasn't had one in 3 years                    Anesthesia Plan    ASA 3     MAC     intravenous induction     Anesthetic plan, all risks, benefits, and alternatives have been provided, discussed and informed consent has been obtained with: patient.

## 2021-04-30 NOTE — ANESTHESIA POSTPROCEDURE EVALUATION
Patient: Mary Anne Wolf    Procedure Summary     Date: 04/30/21 Room / Location: [facility] OR 06 / [facility] OR    Anesthesia Start: 0937 Anesthesia Stop: 0949    Procedure: REMOVE CATARACT AND IMPLANT  INTRAOCULAR LENS (Left Eye) Diagnosis:       Age-related nuclear cataract of left eye      (Age-related nuclear cataract of left eye [H25.12])    Surgeons: Trenton Peres MD Provider: Elva Lamb CRNA    Anesthesia Type: MAC ASA Status: 3          Anesthesia Type: MAC    Vitals  No vitals data found for the desired time range.          Post Anesthesia Care and Evaluation    Patient location during evaluation: bedside  Patient participation: complete - patient participated  Level of consciousness: awake and awake and alert  Pain score: 0  Pain management: adequate  Airway patency: patent  Anesthetic complications: No anesthetic complications  PONV Status: none  Cardiovascular status: acceptable and stable  Respiratory status: acceptable, room air and spontaneous ventilation  Hydration status: acceptable    Comments: BP:  158/79  HR:  71  SAT:  95  RR:  18  TEMP: 97.1

## 2021-05-25 ENCOUNTER — APPOINTMENT (OUTPATIENT)
Dept: GENERAL RADIOLOGY | Facility: HOSPITAL | Age: 76
End: 2021-05-25

## 2021-05-25 ENCOUNTER — HOSPITAL ENCOUNTER (EMERGENCY)
Facility: HOSPITAL | Age: 76
Discharge: HOME | End: 2021-05-25
Attending: EMERGENCY MEDICINE | Admitting: EMERGENCY MEDICINE

## 2021-05-25 VITALS
HEART RATE: 92 BPM | HEIGHT: 64 IN | WEIGHT: 94.4 LBS | DIASTOLIC BLOOD PRESSURE: 68 MMHG | TEMPERATURE: 98.2 F | RESPIRATION RATE: 24 BRPM | OXYGEN SATURATION: 93 % | BODY MASS INDEX: 16.12 KG/M2 | SYSTOLIC BLOOD PRESSURE: 141 MMHG

## 2021-05-25 DIAGNOSIS — J18.9 PNEUMONIA OF RIGHT LOWER LOBE DUE TO INFECTIOUS ORGANISM: ICD-10-CM

## 2021-05-25 DIAGNOSIS — D86.9 SARCOIDOSIS: ICD-10-CM

## 2021-05-25 DIAGNOSIS — R06.02 SHORTNESS OF BREATH: Primary | ICD-10-CM

## 2021-05-25 LAB
ALBUMIN SERPL-MCNC: 3.6 G/DL (ref 3.5–5.2)
ALBUMIN/GLOB SERPL: 0.9 G/DL
ALP SERPL-CCNC: 52 U/L (ref 39–117)
ALT SERPL W P-5'-P-CCNC: 7 U/L (ref 1–33)
ANION GAP SERPL CALCULATED.3IONS-SCNC: 11 MMOL/L (ref 5–15)
ANISOCYTOSIS BLD QL: NORMAL
AST SERPL-CCNC: 15 U/L (ref 1–32)
BASOPHILS # BLD AUTO: 0.03 10*3/MM3 (ref 0–0.2)
BASOPHILS NFR BLD AUTO: 0.3 % (ref 0–1.5)
BILIRUB SERPL-MCNC: 0.4 MG/DL (ref 0–1.2)
BUN SERPL-MCNC: 11 MG/DL (ref 8–23)
BUN/CREAT SERPL: 12.5 (ref 7–25)
CALCIUM SPEC-SCNC: 9.8 MG/DL (ref 8.6–10.5)
CHLORIDE SERPL-SCNC: 102 MMOL/L (ref 98–107)
CO2 SERPL-SCNC: 27 MMOL/L (ref 22–29)
CREAT SERPL-MCNC: 0.88 MG/DL (ref 0.57–1)
DEPRECATED RDW RBC AUTO: 45.3 FL (ref 37–54)
EOSINOPHIL # BLD AUTO: 0.04 10*3/MM3 (ref 0–0.4)
EOSINOPHIL NFR BLD AUTO: 0.4 % (ref 0.3–6.2)
ERYTHROCYTE [DISTWIDTH] IN BLOOD BY AUTOMATED COUNT: 14.6 % (ref 12.3–15.4)
FLUAV RNA RESP QL NAA+PROBE: NOT DETECTED
FLUBV RNA RESP QL NAA+PROBE: NOT DETECTED
GFR SERPL CREATININE-BSD FRML MDRD: 76 ML/MIN/1.73
GLOBULIN UR ELPH-MCNC: 3.8 GM/DL
GLUCOSE SERPL-MCNC: 66 MG/DL (ref 65–99)
HCT VFR BLD AUTO: 42.3 % (ref 34–46.6)
HGB BLD-MCNC: 13.7 G/DL (ref 12–15.9)
HOLD SPECIMEN: NORMAL
HOLD SPECIMEN: NORMAL
IMM GRANULOCYTES # BLD AUTO: 0.05 10*3/MM3 (ref 0–0.05)
IMM GRANULOCYTES NFR BLD AUTO: 0.4 % (ref 0–0.5)
LARGE PLATELETS: NORMAL
LYMPHOCYTES # BLD AUTO: 0.67 10*3/MM3 (ref 0.7–3.1)
LYMPHOCYTES NFR BLD AUTO: 6 % (ref 19.6–45.3)
MCH RBC QN AUTO: 28 PG (ref 26.6–33)
MCHC RBC AUTO-ENTMCNC: 32.4 G/DL (ref 31.5–35.7)
MCV RBC AUTO: 86.3 FL (ref 79–97)
MONOCYTES # BLD AUTO: 0.71 10*3/MM3 (ref 0.1–0.9)
MONOCYTES NFR BLD AUTO: 6.3 % (ref 5–12)
NEUTROPHILS NFR BLD AUTO: 86.6 % (ref 42.7–76)
NEUTROPHILS NFR BLD AUTO: 9.7 10*3/MM3 (ref 1.7–7)
NRBC BLD AUTO-RTO: 0 /100 WBC (ref 0–0.2)
NT-PROBNP SERPL-MCNC: 463.7 PG/ML (ref 0–1800)
PLATELET # BLD AUTO: 99 10*3/MM3 (ref 140–450)
PMV BLD AUTO: 13.4 FL (ref 6–12)
POTASSIUM SERPL-SCNC: 4.2 MMOL/L (ref 3.5–5.2)
PROT SERPL-MCNC: 7.4 G/DL (ref 6–8.5)
RBC # BLD AUTO: 4.9 10*6/MM3 (ref 3.77–5.28)
SARS-COV-2 RNA RESP QL NAA+PROBE: NOT DETECTED
SMALL PLATELETS BLD QL SMEAR: NORMAL
SODIUM SERPL-SCNC: 140 MMOL/L (ref 136–145)
TROPONIN T SERPL-MCNC: 0.02 NG/ML (ref 0–0.03)
WBC # BLD AUTO: 11.2 10*3/MM3 (ref 3.4–10.8)
WBC MORPH BLD: NORMAL
WHOLE BLOOD HOLD SPECIMEN: NORMAL
WHOLE BLOOD HOLD SPECIMEN: NORMAL

## 2021-05-25 PROCEDURE — 85007 BL SMEAR W/DIFF WBC COUNT: CPT | Performed by: EMERGENCY MEDICINE

## 2021-05-25 PROCEDURE — 87636 SARSCOV2 & INF A&B AMP PRB: CPT | Performed by: EMERGENCY MEDICINE

## 2021-05-25 PROCEDURE — 83880 ASSAY OF NATRIURETIC PEPTIDE: CPT | Performed by: EMERGENCY MEDICINE

## 2021-05-25 PROCEDURE — 25010000002 METHYLPREDNISOLONE PER 125 MG: Performed by: EMERGENCY MEDICINE

## 2021-05-25 PROCEDURE — 85025 COMPLETE CBC W/AUTO DIFF WBC: CPT | Performed by: EMERGENCY MEDICINE

## 2021-05-25 PROCEDURE — 96367 TX/PROPH/DG ADDL SEQ IV INF: CPT

## 2021-05-25 PROCEDURE — 96365 THER/PROPH/DIAG IV INF INIT: CPT

## 2021-05-25 PROCEDURE — 93005 ELECTROCARDIOGRAM TRACING: CPT | Performed by: EMERGENCY MEDICINE

## 2021-05-25 PROCEDURE — 80053 COMPREHEN METABOLIC PANEL: CPT | Performed by: EMERGENCY MEDICINE

## 2021-05-25 PROCEDURE — 84484 ASSAY OF TROPONIN QUANT: CPT | Performed by: EMERGENCY MEDICINE

## 2021-05-25 PROCEDURE — 99283 EMERGENCY DEPT VISIT LOW MDM: CPT

## 2021-05-25 PROCEDURE — 71045 X-RAY EXAM CHEST 1 VIEW: CPT

## 2021-05-25 PROCEDURE — 25010000002 CEFTRIAXONE PER 250 MG: Performed by: EMERGENCY MEDICINE

## 2021-05-25 PROCEDURE — 93010 ELECTROCARDIOGRAM REPORT: CPT | Performed by: INTERNAL MEDICINE

## 2021-05-25 PROCEDURE — 93005 ELECTROCARDIOGRAM TRACING: CPT

## 2021-05-25 PROCEDURE — 96375 TX/PRO/DX INJ NEW DRUG ADDON: CPT

## 2021-05-25 PROCEDURE — 25010000002 AZITHROMYCIN PER 500 MG: Performed by: EMERGENCY MEDICINE

## 2021-05-25 RX ORDER — METHYLPREDNISOLONE 4 MG/1
TABLET ORAL
Qty: 21 EACH | Refills: 0 | Status: SHIPPED | OUTPATIENT
Start: 2021-05-25 | End: 2021-06-08

## 2021-05-25 RX ORDER — SODIUM CHLORIDE 0.9 % (FLUSH) 0.9 %
10 SYRINGE (ML) INJECTION AS NEEDED
Status: DISCONTINUED | OUTPATIENT
Start: 2021-05-25 | End: 2021-05-25 | Stop reason: HOSPADM

## 2021-05-25 RX ORDER — METHYLPREDNISOLONE SODIUM SUCCINATE 125 MG/2ML
125 INJECTION, POWDER, LYOPHILIZED, FOR SOLUTION INTRAMUSCULAR; INTRAVENOUS ONCE
Status: COMPLETED | OUTPATIENT
Start: 2021-05-25 | End: 2021-05-25

## 2021-05-25 RX ORDER — AZITHROMYCIN 250 MG/1
TABLET, FILM COATED ORAL
Qty: 6 TABLET | Refills: 0 | Status: SHIPPED | OUTPATIENT
Start: 2021-05-25 | End: 2021-06-08

## 2021-05-25 RX ADMIN — AZITHROMYCIN DIHYDRATE 500 MG: 500 INJECTION, POWDER, LYOPHILIZED, FOR SOLUTION INTRAVENOUS at 13:54

## 2021-05-25 RX ADMIN — METHYLPREDNISOLONE SODIUM SUCCINATE 125 MG: 125 INJECTION, POWDER, FOR SOLUTION INTRAMUSCULAR; INTRAVENOUS at 12:38

## 2021-05-25 RX ADMIN — CEFTRIAXONE SODIUM 1 G: 1 INJECTION, POWDER, FOR SOLUTION INTRAMUSCULAR; INTRAVENOUS at 12:40

## 2021-05-26 ENCOUNTER — EPISODE CHANGES (OUTPATIENT)
Dept: CASE MANAGEMENT | Facility: OTHER | Age: 76
End: 2021-05-26

## 2021-05-27 ENCOUNTER — OFFICE VISIT (OUTPATIENT)
Dept: FAMILY MEDICINE CLINIC | Facility: CLINIC | Age: 76
End: 2021-05-27

## 2021-05-27 VITALS
SYSTOLIC BLOOD PRESSURE: 118 MMHG | DIASTOLIC BLOOD PRESSURE: 68 MMHG | HEIGHT: 64 IN | WEIGHT: 97 LBS | BODY MASS INDEX: 16.56 KG/M2

## 2021-05-27 DIAGNOSIS — R06.00 DYSPNEA, UNSPECIFIED TYPE: Primary | ICD-10-CM

## 2021-05-27 DIAGNOSIS — Z72.0 TOBACCO USE: Chronic | ICD-10-CM

## 2021-05-27 DIAGNOSIS — M32.9 SYSTEMIC LUPUS ERYTHEMATOSUS, UNSPECIFIED SLE TYPE, UNSPECIFIED ORGAN INVOLVEMENT STATUS (HCC): Chronic | ICD-10-CM

## 2021-05-27 DIAGNOSIS — J18.9 PNEUMONIA OF RIGHT LUNG DUE TO INFECTIOUS ORGANISM, UNSPECIFIED PART OF LUNG: ICD-10-CM

## 2021-05-27 PROCEDURE — 99214 OFFICE O/P EST MOD 30 MIN: CPT | Performed by: FAMILY MEDICINE

## 2021-05-27 RX ORDER — ALBUTEROL SULFATE 90 UG/1
2 AEROSOL, METERED RESPIRATORY (INHALATION) EVERY 4 HOURS PRN
Qty: 18 G | Refills: 1 | OUTPATIENT
Start: 2021-05-27 | End: 2021-12-13

## 2021-05-27 NOTE — PROGRESS NOTES
Subjective   Mary Anne Wolf is a 75 y.o. female.  Follow-up.  Was seen for dyspnea called office at first and we referred to ER for symptoms.  Evaluation at that time revealed normal cardiovascular work-up however did have an infiltrate on chest x-ray.  Does smoke.  Was given antibiotic and steroids.  Does not have an inhaler however.  Since being home states feels some better dyspnea not as bad.  Continues on medicine for lupus.  History noted.    History of Present Illness   HPI    The following portions of the patient's history were reviewed and updated as appropriate: allergies, current medications, past family history, past medical history, past social history, past surgical history and problem list.    Review of Systems  Review of Systems   Constitutional: Negative for activity change, appetite change, fatigue and unexpected weight change.   HENT: Negative for trouble swallowing and voice change.    Eyes: Negative for redness and visual disturbance.   Respiratory: Positive for cough and shortness of breath (Better). Negative for wheezing.    Cardiovascular: Negative for chest pain and palpitations.   Gastrointestinal: Negative for abdominal pain, constipation, diarrhea, nausea and vomiting.   Genitourinary: Negative for urgency.   Musculoskeletal: Negative for joint swelling.   Neurological: Negative for syncope and headaches.   Hematological: Negative for adenopathy.   Psychiatric/Behavioral: Negative for sleep disturbance.       Objective   Physical Exam  Physical Exam  Constitutional:       Appearance: She is well-developed.   HENT:      Head: Normocephalic.   Eyes:      Pupils: Pupils are equal, round, and reactive to light.   Neck:      Thyroid: No thyromegaly.   Cardiovascular:      Rate and Rhythm: Normal rate and regular rhythm.      Heart sounds: Normal heart sounds. No murmur heard.   No friction rub. No gallop.    Pulmonary:      Effort: Pulmonary effort is normal. Prolonged expiration (Minimal)  "present.      Breath sounds: Examination of the right-lower field reveals rhonchi. Rhonchi present.      Comments: Scant rhonchi right base normal rate however.  Not dyspneic at rest or walking.  O2 sat not working  Abdominal:      General: There is no distension.      Palpations: Abdomen is soft. There is no mass.      Tenderness: There is no abdominal tenderness.   Musculoskeletal:         General: Normal range of motion.      Cervical back: Normal range of motion.   Skin:     General: Skin is warm and dry.   Neurological:      Mental Status: She is alert and oriented to person, place, and time.      Deep Tendon Reflexes: Reflexes are normal and symmetric.   Psychiatric:      Comments: No distress           Visit Vitals  /68   Ht 162.6 cm (64\")   Wt 44 kg (97 lb)   LMP  (LMP Unknown)   BMI 16.65 kg/m²     Body mass index is 16.65 kg/m².    /68   Ht 162.6 cm (64\")   Wt 44 kg (97 lb)   LMP  (LMP Unknown)   BMI 16.65 kg/m²     Assessment/Plan   Diagnoses and all orders for this visit:    1. Dyspnea, unspecified type (Primary)  -     albuterol sulfate  (90 Base) MCG/ACT inhaler; Inhale 2 puffs Every 4 (Four) Hours As Needed for Wheezing or Shortness of Air.  Dispense: 18 g; Refill: 1    2. Pneumonia of right lung due to infectious organism, unspecified part of lung  -     albuterol sulfate  (90 Base) MCG/ACT inhaler; Inhale 2 puffs Every 4 (Four) Hours As Needed for Wheezing or Shortness of Air.  Dispense: 18 g; Refill: 1    3. Systemic lupus erythematosus, unspecified SLE type, unspecified organ involvement status (CMS/[facility])    4. Tobacco use     on stopping smoking.  3-10m     Counseled on increasing hydration environmental control measures use of albuterol inhaler as needed finish all antibiotics and steroids.  Lifestyle measures discussed recheck 2 weeks and will consider repeat chest x-ray  "

## 2021-06-07 DIAGNOSIS — M32.9 SYSTEMIC LUPUS ERYTHEMATOSUS, UNSPECIFIED SLE TYPE, UNSPECIFIED ORGAN INVOLVEMENT STATUS (HCC): Primary | ICD-10-CM

## 2021-06-07 RX ORDER — GABAPENTIN 800 MG/1
800 TABLET ORAL NIGHTLY
Qty: 90 TABLET | Refills: 2 | Status: SHIPPED | OUTPATIENT
Start: 2021-06-07 | End: 2021-09-15 | Stop reason: SDUPTHER

## 2021-06-08 ENCOUNTER — OFFICE VISIT (OUTPATIENT)
Dept: FAMILY MEDICINE CLINIC | Facility: CLINIC | Age: 76
End: 2021-06-08

## 2021-06-08 VITALS
BODY MASS INDEX: 16.25 KG/M2 | WEIGHT: 95.2 LBS | SYSTOLIC BLOOD PRESSURE: 118 MMHG | HEIGHT: 64 IN | DIASTOLIC BLOOD PRESSURE: 62 MMHG

## 2021-06-08 DIAGNOSIS — Z72.0 TOBACCO USE: Chronic | ICD-10-CM

## 2021-06-08 DIAGNOSIS — J18.9 PNEUMONIA OF RIGHT LOWER LOBE DUE TO INFECTIOUS ORGANISM: Primary | ICD-10-CM

## 2021-06-08 DIAGNOSIS — M32.9 SYSTEMIC LUPUS ERYTHEMATOSUS, UNSPECIFIED SLE TYPE, UNSPECIFIED ORGAN INVOLVEMENT STATUS (HCC): Chronic | ICD-10-CM

## 2021-06-08 DIAGNOSIS — R51.9 NONINTRACTABLE HEADACHE, UNSPECIFIED CHRONICITY PATTERN, UNSPECIFIED HEADACHE TYPE: ICD-10-CM

## 2021-06-08 PROBLEM — IMO0002 NUCLEAR CATARACT: Chronic | Status: ACTIVE | Noted: 2017-04-12

## 2021-06-08 PROBLEM — H40.009 BORDERLINE GLAUCOMA: Chronic | Status: ACTIVE | Noted: 2017-04-12

## 2021-06-08 PROCEDURE — 99214 OFFICE O/P EST MOD 30 MIN: CPT | Performed by: FAMILY MEDICINE

## 2021-06-08 NOTE — PROGRESS NOTES
Subjective   Mary Anne Wolf is a 76 y.o. female.  Reevaluation pneumonia tobacco abuse immunocompromise state.  In interim took all steroids and antibiotics using inhalers routinely states breathing is some better no cough no fever no chills.  Unfortunate continues to smoke some.  Continues on medicines as outlined.    History of Present Illness   HPI    The following portions of the patient's history were reviewed and updated as appropriate: allergies, current medications, past family history, past medical history, past social history, past surgical history and problem list.    Review of Systems  Review of Systems   Constitutional: Negative for activity change, appetite change, fatigue and unexpected weight change.   HENT: Negative for trouble swallowing and voice change.    Eyes: Negative for redness and visual disturbance.   Respiratory: Negative for cough and wheezing.    Cardiovascular: Negative for chest pain and palpitations.   Gastrointestinal: Negative for abdominal pain, constipation, diarrhea, nausea and vomiting.   Genitourinary: Negative for urgency.   Musculoskeletal: Negative for joint swelling.   Neurological: Positive for headaches (Mainly related to neck discomfort and spasm and arthritis). Negative for syncope.   Hematological: Negative for adenopathy.   Psychiatric/Behavioral: Negative for sleep disturbance.       Objective   Physical Exam  Physical Exam  Constitutional:       Appearance: She is well-developed.   HENT:      Head: Normocephalic.   Eyes:      Pupils: Pupils are equal, round, and reactive to light.   Neck:      Thyroid: No thyromegaly.      Comments: Supple normal range of motion no popping no clicking.  Cardiovascular:      Rate and Rhythm: Normal rate and regular rhythm.      Heart sounds: Normal heart sounds. No murmur heard.   No friction rub. No gallop.    Pulmonary:      Effort: Pulmonary effort is normal.      Breath sounds: Normal breath sounds.      Comments: Normal rate  "and phase scant rhonchi right base  Abdominal:      General: There is no distension.      Palpations: Abdomen is soft. There is no mass.      Tenderness: There is no abdominal tenderness.   Musculoskeletal:         General: Normal range of motion.      Cervical back: Normal range of motion.      Comments: Up and go 3 to 5 seconds gait slightly wide-based uses cane   Skin:     General: Skin is warm and dry.   Neurological:      Mental Status: She is alert and oriented to person, place, and time.      Deep Tendon Reflexes: Reflexes are normal and symmetric.   Psychiatric:         Mood and Affect: Affect is blunt.         Speech: Speech is delayed.         Behavior: Behavior is slowed.           Visit Vitals  /62   Ht 162.6 cm (64\")   Wt 43.2 kg (95 lb 3.2 oz)   LMP  (LMP Unknown)   BMI 16.34 kg/m²     Body mass index is 16.34 kg/m².      Assessment/Plan   Diagnoses and all orders for this visit:    1. Pneumonia of right lower lobe due to infectious organism (Primary)  -     XR Chest 2 View    2. Tobacco use  -     XR Chest 2 View    3. Systemic lupus erythematosus, unspecified SLE type, unspecified organ involvement status (CMS/[facility])    4. Nonintractable headache, unspecified chronicity pattern, unspecified headache type     on stopping smoking.  3-10m     Counseled on heat rest massage stretching symptomatic relief of headache.  Repeat chest x-ray today encouraged lifestyle measures increasing hydration environmental control flu vaccine in the fall keep regular follow-up  "

## 2021-06-16 DIAGNOSIS — M81.0 OSTEOPOROSIS, UNSPECIFIED OSTEOPOROSIS TYPE, UNSPECIFIED PATHOLOGICAL FRACTURE PRESENCE: Primary | ICD-10-CM

## 2021-06-17 ENCOUNTER — LAB (OUTPATIENT)
Dept: ONCOLOGY | Facility: HOSPITAL | Age: 76
End: 2021-06-17

## 2021-06-17 DIAGNOSIS — M81.0 OSTEOPOROSIS, UNSPECIFIED OSTEOPOROSIS TYPE, UNSPECIFIED PATHOLOGICAL FRACTURE PRESENCE: Chronic | ICD-10-CM

## 2021-06-17 PROCEDURE — 83735 ASSAY OF MAGNESIUM: CPT | Performed by: FAMILY MEDICINE

## 2021-06-17 PROCEDURE — 84100 ASSAY OF PHOSPHORUS: CPT | Performed by: FAMILY MEDICINE

## 2021-06-17 PROCEDURE — 36415 COLL VENOUS BLD VENIPUNCTURE: CPT | Performed by: NURSE PRACTITIONER

## 2021-06-17 PROCEDURE — 80053 COMPREHEN METABOLIC PANEL: CPT | Performed by: FAMILY MEDICINE

## 2021-06-22 ENCOUNTER — INFUSION (OUTPATIENT)
Dept: ONCOLOGY | Facility: HOSPITAL | Age: 76
End: 2021-06-22

## 2021-06-22 VITALS — HEART RATE: 78 BPM | TEMPERATURE: 97.1 F | DIASTOLIC BLOOD PRESSURE: 66 MMHG | SYSTOLIC BLOOD PRESSURE: 124 MMHG

## 2021-06-22 DIAGNOSIS — M81.0 OSTEOPOROSIS, UNSPECIFIED OSTEOPOROSIS TYPE, UNSPECIFIED PATHOLOGICAL FRACTURE PRESENCE: Primary | ICD-10-CM

## 2021-06-22 PROCEDURE — 96372 THER/PROPH/DIAG INJ SC/IM: CPT | Performed by: NURSE PRACTITIONER

## 2021-06-22 PROCEDURE — 25010000002 DENOSUMAB 60 MG/ML SOLUTION PREFILLED SYRINGE: Performed by: FAMILY MEDICINE

## 2021-06-22 RX ADMIN — DENOSUMAB 60 MG: 60 INJECTION SUBCUTANEOUS at 13:11

## 2021-06-24 LAB
QT INTERVAL: 350 MS
QTC INTERVAL: 432 MS

## 2021-07-07 ENCOUNTER — OFFICE VISIT (OUTPATIENT)
Dept: FAMILY MEDICINE CLINIC | Facility: CLINIC | Age: 76
End: 2021-07-07

## 2021-07-07 VITALS — DIASTOLIC BLOOD PRESSURE: 64 MMHG | BODY MASS INDEX: 16.31 KG/M2 | WEIGHT: 95 LBS | SYSTOLIC BLOOD PRESSURE: 132 MMHG

## 2021-07-07 DIAGNOSIS — R60.9 DEPENDENT EDEMA: ICD-10-CM

## 2021-07-07 DIAGNOSIS — L65.9 ALOPECIA: ICD-10-CM

## 2021-07-07 DIAGNOSIS — L98.9 LESION OF SKIN OF SCALP: Primary | ICD-10-CM

## 2021-07-07 DIAGNOSIS — M32.9 SYSTEMIC LUPUS ERYTHEMATOSUS, UNSPECIFIED SLE TYPE, UNSPECIFIED ORGAN INVOLVEMENT STATUS (HCC): Chronic | ICD-10-CM

## 2021-07-07 PROCEDURE — 99213 OFFICE O/P EST LOW 20 MIN: CPT | Performed by: FAMILY MEDICINE

## 2021-07-07 RX ORDER — FLUOCINOLONE ACETONIDE 0.11 MG/ML
1 OIL TOPICAL
COMMUNITY
Start: 2021-07-07 | End: 2021-08-07

## 2021-07-07 NOTE — PROGRESS NOTES
BodySubjective   Mary Anne Wolf is a 76 y.o. female.  Requesting evaluation dependent edema scalp lesion as well.  Patient apparently had medicine phoned in by her rheumatologist for a scalp lesion that continues to grow instead of get better.  Appearing this was what sounds like a high potency steroid solution.  Setting dependent edema at the end the day multifactorial mainly related to recent steroid use pneumonia.  Continues on medicines as outlined for her lupus neuropathy and other diagnoses.    History of Present Illness   HPI    The following portions of the patient's history were reviewed and updated as appropriate: allergies, current medications, past family history, past medical history, past social history, past surgical history and problem list.    Review of Systems  Review of Systems   Constitutional: Negative for activity change, appetite change, fatigue and unexpected weight change.   HENT: Negative for trouble swallowing and voice change.    Eyes: Negative for redness and visual disturbance.   Respiratory: Negative for cough and wheezing.    Cardiovascular: Positive for leg swelling. Negative for chest pain and palpitations.   Gastrointestinal: Negative for abdominal pain, constipation, diarrhea, nausea and vomiting.   Genitourinary: Negative for urgency.   Musculoskeletal: Positive for arthralgias. Negative for joint swelling.   Skin: Positive for rash.   Neurological: Positive for numbness. Negative for syncope and headaches.   Hematological: Negative for adenopathy.   Psychiatric/Behavioral: Negative for sleep disturbance.       Objective   Physical Exam  Physical Exam  Cardiovascular:      Rate and Rhythm: Normal rate.      Pulses: Normal pulses.   Pulmonary:      Effort: Pulmonary effort is normal.   Musculoskeletal:      Cervical back: Normal range of motion.      Comments: Mild venous insufficiency lower extremities but no edema.  1 to 2+ pulses no skin breakdown.   Skin:     Comments: Left  parietal occipital area shows a large 4 to 6 cm roundish area with hair loss in the center slightly raised areas around the edge with occasional ingrown hairs.  Discoloration also with mild brown spots surrounding.   Neurological:      Mental Status: She is alert.   Psychiatric:         Attention and Perception: Attention normal.         Mood and Affect: Affect is blunt.         Speech: Speech is delayed.         Behavior: Behavior is slowed.           Visit Vitals  /64   Wt 43.1 kg (95 lb)   LMP  (LMP Unknown)   BMI 16.31 kg/m²     Body mass index is 16.31 kg/m².  /64   Wt 43.1 kg (95 lb)   LMP  (LMP Unknown)   BMI 16.31 kg/m²       Assessment/Plan   Diagnoses and all orders for this visit:    1. Lesion of skin of scalp (Primary)  -     Ambulatory Referral to Dermatology    2. Alopecia  -     Ambulatory Referral to Dermatology    3. Dependent edema    4. Systemic lupus erythematosus, unspecified SLE type, unspecified organ involvement status (CMS/[facility])    Multiple possibilities for the scalp including a fungal infection alopecia atypical psoriasis versus SLE.   is given the size and failure of high potency steroids ago new dermatologic consultation arrangements made for same.  For the legs counseled leg elevation exercise.  Counseled diuretics actually make things worse.  Keep regular follow-up

## 2021-09-15 ENCOUNTER — OFFICE VISIT (OUTPATIENT)
Dept: FAMILY MEDICINE CLINIC | Facility: CLINIC | Age: 76
End: 2021-09-15

## 2021-09-15 VITALS
HEIGHT: 64 IN | WEIGHT: 96.4 LBS | BODY MASS INDEX: 16.46 KG/M2 | SYSTOLIC BLOOD PRESSURE: 138 MMHG | DIASTOLIC BLOOD PRESSURE: 78 MMHG

## 2021-09-15 DIAGNOSIS — G62.9 NEUROPATHY: Chronic | ICD-10-CM

## 2021-09-15 DIAGNOSIS — Z12.31 BREAST CANCER SCREENING BY MAMMOGRAM: ICD-10-CM

## 2021-09-15 DIAGNOSIS — M32.9 SYSTEMIC LUPUS ERYTHEMATOSUS, UNSPECIFIED SLE TYPE, UNSPECIFIED ORGAN INVOLVEMENT STATUS (HCC): Chronic | ICD-10-CM

## 2021-09-15 DIAGNOSIS — I10 ESSENTIAL HYPERTENSION: Primary | Chronic | ICD-10-CM

## 2021-09-15 PROCEDURE — 99214 OFFICE O/P EST MOD 30 MIN: CPT | Performed by: FAMILY MEDICINE

## 2021-09-15 RX ORDER — GABAPENTIN 800 MG/1
TABLET ORAL
Qty: 180 TABLET | Refills: 2 | Status: SHIPPED | OUTPATIENT
Start: 2021-09-15 | End: 2022-07-11 | Stop reason: SDUPTHER

## 2021-09-15 NOTE — PROGRESS NOTES
Subjective   Mary Anne Wolf is a 76 y.o. female   reevaluation lupus anemia skin lesions tobacco abuse hypertension diagnoses below.  In interim to visit dermatology was not given a specific diagnosis yet.  Still having problems with peripheral neuropathy multifactorial.  Last lab work was acceptable.  Is due for mammogram next month.  Counseled day mainly on getting flu vaccine high-dose in the fall as well as Covid booster.  History noted.  History of Present Illness   HPI    The following portions of the patient's history were reviewed and updated as appropriate: allergies, current medications, past family history, past medical history, past social history, past surgical history and problem list.    Review of Systems  Review of Systems   Constitutional: Negative for activity change, appetite change, fatigue and unexpected weight change.   HENT: Negative for trouble swallowing and voice change.    Eyes: Negative for redness and visual disturbance.   Respiratory: Negative for cough and wheezing.    Cardiovascular: Negative for chest pain and palpitations.   Gastrointestinal: Negative for abdominal pain, constipation, diarrhea, nausea and vomiting.   Genitourinary: Negative for urgency.   Musculoskeletal: Negative for joint swelling.   Neurological: Positive for numbness. Negative for syncope and headaches.   Hematological: Negative for adenopathy.   Psychiatric/Behavioral: Negative for sleep disturbance.       Objective   Physical Exam  Physical Exam  Constitutional:       Appearance: She is well-developed.   HENT:      Head: Normocephalic.   Eyes:      Pupils: Pupils are equal, round, and reactive to light.   Neck:      Thyroid: No thyromegaly.   Cardiovascular:      Rate and Rhythm: Normal rate and regular rhythm.      Heart sounds: Normal heart sounds. No murmur heard.   No friction rub. No gallop.    Pulmonary:      Breath sounds: Normal breath sounds.   Abdominal:      General: There is no distension.       "Palpations: Abdomen is soft. There is no mass.      Tenderness: There is no abdominal tenderness.   Musculoskeletal:         General: Normal range of motion.      Cervical back: Normal range of motion.      Comments: No peripheral edema 2+ pulses get up and go 3 to 5 seconds with cane slightly wide-based gait   Skin:     General: Skin is warm and dry.      Comments: No new lesions   Neurological:      Mental Status: She is alert and oriented to person, place, and time.      Deep Tendon Reflexes: Reflexes are normal and symmetric.   Psychiatric:         Attention and Perception: Attention normal.         Mood and Affect: Mood normal.         Speech: Speech normal.         Behavior: Behavior normal.         Thought Content: Thought content normal.         Cognition and Memory: Cognition normal.           Visit Vitals  /78   Ht 162.6 cm (64\")   Wt 43.7 kg (96 lb 6.4 oz)   LMP  (LMP Unknown)   BMI 16.55 kg/m²     Body mass index is 16.55 kg/m².  /78   Ht 162.6 cm (64\")   Wt 43.7 kg (96 lb 6.4 oz)   LMP  (LMP Unknown)   BMI 16.55 kg/m²       Assessment/Plan   Diagnoses and all orders for this visit:    1. Essential hypertension (Primary)    2. Systemic lupus erythematosus, unspecified SLE type, unspecified organ involvement status (CMS/HCC)    3. Neuropathy    4. Breast cancer screening by mammogram  -     Mammo Screening Digital Tomosynthesis Bilateral With CAD; Future     on stopping smoking.  3-10m     Counts immunizations fall prevention hydration infection prevention etc. adjust gabapentin upwards follow-up routinely 6 months will be time for subsequent Medicare  "

## 2021-12-10 DIAGNOSIS — M81.0 OSTEOPOROSIS, UNSPECIFIED OSTEOPOROSIS TYPE, UNSPECIFIED PATHOLOGICAL FRACTURE PRESENCE: Primary | ICD-10-CM

## 2021-12-13 ENCOUNTER — APPOINTMENT (OUTPATIENT)
Dept: GENERAL RADIOLOGY | Facility: HOSPITAL | Age: 76
End: 2021-12-13

## 2021-12-13 ENCOUNTER — HOSPITAL ENCOUNTER (EMERGENCY)
Facility: HOSPITAL | Age: 76
Discharge: HOME | End: 2021-12-13
Attending: EMERGENCY MEDICINE | Admitting: EMERGENCY MEDICINE

## 2021-12-13 VITALS
TEMPERATURE: 97.9 F | DIASTOLIC BLOOD PRESSURE: 85 MMHG | SYSTOLIC BLOOD PRESSURE: 169 MMHG | BODY MASS INDEX: 16.43 KG/M2 | WEIGHT: 96.2 LBS | OXYGEN SATURATION: 93 % | HEIGHT: 64 IN | RESPIRATION RATE: 18 BRPM | HEART RATE: 86 BPM

## 2021-12-13 DIAGNOSIS — J20.9 ACUTE BRONCHITIS, UNSPECIFIED ORGANISM: Primary | ICD-10-CM

## 2021-12-13 DIAGNOSIS — I10 HYPERTENSION, UNSPECIFIED TYPE: ICD-10-CM

## 2021-12-13 LAB
ALBUMIN SERPL-MCNC: 4 G/DL (ref 3.5–5.2)
ALBUMIN/GLOB SERPL: 1.3 G/DL
ALP SERPL-CCNC: 58 U/L (ref 39–117)
ALT SERPL W P-5'-P-CCNC: 7 U/L (ref 1–33)
ANION GAP SERPL CALCULATED.3IONS-SCNC: 8 MMOL/L (ref 5–15)
AST SERPL-CCNC: 19 U/L (ref 1–32)
BASOPHILS # BLD AUTO: 0.02 10*3/MM3 (ref 0–0.2)
BASOPHILS NFR BLD AUTO: 0.3 % (ref 0–1.5)
BILIRUB SERPL-MCNC: 0.3 MG/DL (ref 0–1.2)
BUN SERPL-MCNC: 13 MG/DL (ref 8–23)
BUN/CREAT SERPL: 14.1 (ref 7–25)
CALCIUM SPEC-SCNC: 9.1 MG/DL (ref 8.6–10.5)
CHLORIDE SERPL-SCNC: 101 MMOL/L (ref 98–107)
CO2 SERPL-SCNC: 28 MMOL/L (ref 22–29)
CREAT SERPL-MCNC: 0.92 MG/DL (ref 0.57–1)
D-DIMER, QUANTITATIVE (MAD,POW, STR): 367 NG/ML (FEU) (ref 0–470)
DEPRECATED RDW RBC AUTO: 45.4 FL (ref 37–54)
EOSINOPHIL # BLD AUTO: 0.1 10*3/MM3 (ref 0–0.4)
EOSINOPHIL NFR BLD AUTO: 1.7 % (ref 0.3–6.2)
ERYTHROCYTE [DISTWIDTH] IN BLOOD BY AUTOMATED COUNT: 14.4 % (ref 12.3–15.4)
FLUAV RNA RESP QL NAA+PROBE: NOT DETECTED
FLUBV RNA RESP QL NAA+PROBE: NOT DETECTED
GFR SERPL CREATININE-BSD FRML MDRD: 72 ML/MIN/1.73
GLOBULIN UR ELPH-MCNC: 3.2 GM/DL
GLUCOSE SERPL-MCNC: 88 MG/DL (ref 65–99)
HCT VFR BLD AUTO: 38.8 % (ref 34–46.6)
HGB BLD-MCNC: 12.5 G/DL (ref 12–15.9)
IMM GRANULOCYTES # BLD AUTO: 0.02 10*3/MM3 (ref 0–0.05)
IMM GRANULOCYTES NFR BLD AUTO: 0.3 % (ref 0–0.5)
LYMPHOCYTES # BLD AUTO: 0.57 10*3/MM3 (ref 0.7–3.1)
LYMPHOCYTES NFR BLD AUTO: 9.5 % (ref 19.6–45.3)
MCH RBC QN AUTO: 27.8 PG (ref 26.6–33)
MCHC RBC AUTO-ENTMCNC: 32.2 G/DL (ref 31.5–35.7)
MCV RBC AUTO: 86.4 FL (ref 79–97)
MONOCYTES # BLD AUTO: 0.7 10*3/MM3 (ref 0.1–0.9)
MONOCYTES NFR BLD AUTO: 11.7 % (ref 5–12)
NEUTROPHILS NFR BLD AUTO: 4.59 10*3/MM3 (ref 1.7–7)
NEUTROPHILS NFR BLD AUTO: 76.5 % (ref 42.7–76)
NRBC BLD AUTO-RTO: 0 /100 WBC (ref 0–0.2)
NT-PROBNP SERPL-MCNC: 427.8 PG/ML (ref 0–1800)
PLATELET # BLD AUTO: 141 10*3/MM3 (ref 140–450)
PMV BLD AUTO: 12.7 FL (ref 6–12)
POTASSIUM SERPL-SCNC: 4.3 MMOL/L (ref 3.5–5.2)
PROT SERPL-MCNC: 7.2 G/DL (ref 6–8.5)
QT INTERVAL: 358 MS
QTC INTERVAL: 435 MS
RBC # BLD AUTO: 4.49 10*6/MM3 (ref 3.77–5.28)
SARS-COV-2 RNA RESP QL NAA+PROBE: NOT DETECTED
SODIUM SERPL-SCNC: 137 MMOL/L (ref 136–145)
TROPONIN T SERPL-MCNC: 0.03 NG/ML (ref 0–0.03)
VALPROATE SERPL-MCNC: 45.4 MCG/ML (ref 50–125)
WBC NRBC COR # BLD: 6 10*3/MM3 (ref 3.4–10.8)

## 2021-12-13 PROCEDURE — 87636 SARSCOV2 & INF A&B AMP PRB: CPT | Performed by: EMERGENCY MEDICINE

## 2021-12-13 PROCEDURE — 85379 FIBRIN DEGRADATION QUANT: CPT | Performed by: EMERGENCY MEDICINE

## 2021-12-13 PROCEDURE — 93010 ELECTROCARDIOGRAM REPORT: CPT | Performed by: INTERNAL MEDICINE

## 2021-12-13 PROCEDURE — 80164 ASSAY DIPROPYLACETIC ACD TOT: CPT | Performed by: EMERGENCY MEDICINE

## 2021-12-13 PROCEDURE — 25010000002 METHYLPREDNISOLONE PER 125 MG: Performed by: EMERGENCY MEDICINE

## 2021-12-13 PROCEDURE — 93005 ELECTROCARDIOGRAM TRACING: CPT | Performed by: EMERGENCY MEDICINE

## 2021-12-13 PROCEDURE — 85025 COMPLETE CBC W/AUTO DIFF WBC: CPT | Performed by: EMERGENCY MEDICINE

## 2021-12-13 PROCEDURE — 94664 DEMO&/EVAL PT USE INHALER: CPT

## 2021-12-13 PROCEDURE — 84484 ASSAY OF TROPONIN QUANT: CPT | Performed by: EMERGENCY MEDICINE

## 2021-12-13 PROCEDURE — 94640 AIRWAY INHALATION TREATMENT: CPT

## 2021-12-13 PROCEDURE — 99284 EMERGENCY DEPT VISIT MOD MDM: CPT

## 2021-12-13 PROCEDURE — 71045 X-RAY EXAM CHEST 1 VIEW: CPT

## 2021-12-13 PROCEDURE — 80053 COMPREHEN METABOLIC PANEL: CPT | Performed by: EMERGENCY MEDICINE

## 2021-12-13 PROCEDURE — 96374 THER/PROPH/DIAG INJ IV PUSH: CPT

## 2021-12-13 PROCEDURE — 83880 ASSAY OF NATRIURETIC PEPTIDE: CPT | Performed by: EMERGENCY MEDICINE

## 2021-12-13 RX ORDER — IPRATROPIUM BROMIDE AND ALBUTEROL SULFATE 2.5; .5 MG/3ML; MG/3ML
3 SOLUTION RESPIRATORY (INHALATION) ONCE
Status: COMPLETED | OUTPATIENT
Start: 2021-12-13 | End: 2021-12-13

## 2021-12-13 RX ORDER — HYDRALAZINE HYDROCHLORIDE 20 MG/ML
10 INJECTION INTRAMUSCULAR; INTRAVENOUS ONCE
Status: DISCONTINUED | OUTPATIENT
Start: 2021-12-13 | End: 2021-12-13

## 2021-12-13 RX ORDER — BENZONATATE 100 MG/1
100 CAPSULE ORAL 3 TIMES DAILY PRN
Qty: 15 CAPSULE | Refills: 0 | Status: SHIPPED | OUTPATIENT
Start: 2021-12-13 | End: 2021-12-20

## 2021-12-13 RX ORDER — METHYLPREDNISOLONE SODIUM SUCCINATE 125 MG/2ML
125 INJECTION, POWDER, LYOPHILIZED, FOR SOLUTION INTRAMUSCULAR; INTRAVENOUS ONCE
Status: COMPLETED | OUTPATIENT
Start: 2021-12-13 | End: 2021-12-13

## 2021-12-13 RX ORDER — ALBUTEROL SULFATE 90 UG/1
2 AEROSOL, METERED RESPIRATORY (INHALATION) EVERY 6 HOURS PRN
Qty: 8 G | Refills: 0 | Status: SHIPPED | OUTPATIENT
Start: 2021-12-13 | End: 2021-12-21 | Stop reason: SDUPTHER

## 2021-12-13 RX ORDER — SODIUM CHLORIDE 0.9 % (FLUSH) 0.9 %
10 SYRINGE (ML) INJECTION AS NEEDED
Status: DISCONTINUED | OUTPATIENT
Start: 2021-12-13 | End: 2021-12-13 | Stop reason: HOSPADM

## 2021-12-13 RX ORDER — PREDNISONE 20 MG/1
20 TABLET ORAL 2 TIMES DAILY
Qty: 10 TABLET | Refills: 0 | Status: SHIPPED | OUTPATIENT
Start: 2021-12-13 | End: 2021-12-18

## 2021-12-13 RX ADMIN — IPRATROPIUM BROMIDE AND ALBUTEROL SULFATE 3 ML: 2.5; .5 SOLUTION RESPIRATORY (INHALATION) at 07:04

## 2021-12-13 RX ADMIN — METHYLPREDNISOLONE SODIUM SUCCINATE 125 MG: 125 INJECTION, POWDER, FOR SOLUTION INTRAMUSCULAR; INTRAVENOUS at 06:33

## 2021-12-13 NOTE — ED PROVIDER NOTES
Subjective   76-year-old -American female with a history of multiple medical problems including lupus presents to the emergency department with chief complaint of cough.  Patient complains of cough, congestion, and shortness of breath for 3 days.  She denies fever, sweats, chills, chest pain, abdominal pain, nausea, or vomiting.          Review of Systems   Constitutional: Negative for chills, diaphoresis and fever.   HENT: Positive for congestion.    Respiratory: Positive for cough and shortness of breath.    Cardiovascular: Negative for chest pain and leg swelling.   Gastrointestinal: Negative for abdominal pain, nausea and vomiting.   Genitourinary: Negative for dysuria.   Musculoskeletal: Positive for arthralgias. Negative for back pain and neck pain.   Neurological: Positive for headaches. Negative for seizures, syncope and weakness.   All other systems reviewed and are negative.      Past Medical History:   Diagnosis Date   • Acute posthemorrhagic anemia    • Arthritis    • Backache     LLE radiculopathy   • Cigarette smoker    • Discoid lupus erythematosus    • Diverticular disease of colon     incomplete colonoscopy to Hepatic flexure.  Rec ACBE, pt. considering   • Dizziness and giddiness    • Dysphagia    • Esophagitis     grade III   • Gastroesophageal reflux disease     rec pt try RX prilosec   • H/O screening mammography    • Localization-related (focal) (partial) symptomatic epilepsy and epileptic syndromes with complex partial seizures, intractable, without status epilepticus (HCC)    • Neuropathy    • Neuropathy    • Nicotine dependence    • Osteopenia    • Periumbilical pain    • Seizure disorder (HCC)    • Seizure disorder (HCC)     history of complex partial seizure   • Stricture of esophagus    • Systemic lupus erythematosus (HCC)    • Vertigo    • Vertigo        Allergies   Allergen Reactions   • Milk-Related Compounds GI Intolerance       Past Surgical History:   Procedure Laterality  Date   • CATARACT EXTRACTION W/ INTRAOCULAR LENS IMPLANT Right 4/23/2021    Procedure: REMOVE CATARACT AND IMPLANT INTRAOCULAR LENS;  Surgeon: Trenton Peres MD;  Location: [facility] OR;  Service: Ophthalmology;  Laterality: Right;   • CATARACT EXTRACTION W/ INTRAOCULAR LENS IMPLANT Left 4/30/2021    Procedure: REMOVE CATARACT AND IMPLANT  INTRAOCULAR LENS;  Surgeon: Trenton Peres MD;  Location: [facility] OR;  Service: Ophthalmology;  Laterality: Left;   • COLONOSCOPY  08/05/2015   • COLONOSCOPY  07/08/2014    REFUSED BY PATIENT   • COLONOSCOPY  08/05/2015    a diverticulum was found in the sigmoid colon   • ENDOSCOPY  08/05/2015    Esophageal stricture was present. Dilatatin was performed. Esophagitis . Biopsy taken. Normal stomach. Normal duodenum   • ENDOSCOPY N/A 12/6/2018    Procedure: ESOPHAGOGASTRODUODENOSCOPY;  Surgeon: Cory Bliss DO;  Location: [facility] ENDOSCOPY;  Service: Gastroenterology   • UPPER GASTROINTESTINAL ENDOSCOPY  08/05/2015       Family History   Problem Relation Age of Onset   • Lupus Mother    • Rheum arthritis Mother    • Lupus Sister        Social History     Socioeconomic History   • Marital status:    Tobacco Use   • Smoking status: Current Every Day Smoker     Types: Cigarettes   • Smokeless tobacco: Never Used   Substance and Sexual Activity   • Alcohol use: No   • Drug use: No   • Sexual activity: Defer           Objective   Physical Exam  Vitals and nursing note reviewed.   Constitutional:       General: She is not in acute distress.     Appearance: She is not toxic-appearing or diaphoretic.   HENT:      Head: Normocephalic and atraumatic.      Right Ear: External ear normal.      Left Ear: External ear normal.      Nose: Nose normal.      Mouth/Throat:      Mouth: Mucous membranes are moist.      Pharynx: Oropharynx is clear.   Eyes:      Extraocular Movements: Extraocular movements intact.      Conjunctiva/sclera: Conjunctivae normal.      Pupils: Pupils  are equal, round, and reactive to light.   Cardiovascular:      Rate and Rhythm: Normal rate and regular rhythm.      Pulses: Normal pulses.      Heart sounds: Normal heart sounds.   Pulmonary:      Effort: Pulmonary effort is normal. No respiratory distress.      Breath sounds: Wheezing present.   Abdominal:      General: Bowel sounds are normal. There is no distension.      Palpations: Abdomen is soft. There is no mass.      Tenderness: There is no abdominal tenderness. There is no guarding.   Musculoskeletal:      Cervical back: Normal range of motion and neck supple.      Right lower leg: No edema.      Left lower leg: No edema.   Skin:     General: Skin is warm and dry.   Neurological:      General: No focal deficit present.      Mental Status: She is alert and oriented to person, place, and time.   Psychiatric:         Mood and Affect: Mood normal.         Behavior: Behavior normal.         ECG 12 Lead      Date/Time: 12/13/2021 6:28 AM  Performed by: Neftaly Sheth MD  Authorized by: Neftaly Sheth MD   Interpreted by physician  Rhythm: sinus rhythm  Rate: normal  BPM: 89  QRS axis: normal  Conduction: conduction normal  ST Segments: ST segments normal  T Waves: T waves normal  Other findings: PRWP  Clinical impression: abnormal ECG                 ED Course  ED Course as of 12/13/21 0654   Mon Dec 13, 2021   0652 Patient is alert and resting comfortably. I reviewed the results of the emergency department evaluation with the patient.  I recommended primary care follow-up.  I advised the patient to return to the emergency department if their symptoms change or worsen.  [DR]      ED Course User Index  [DR] Neftaly Sheth MD           Labs Reviewed   CBC WITH AUTO DIFFERENTIAL - Abnormal; Notable for the following components:       Result Value    MPV 12.7 (*)     Neutrophil % 76.5 (*)     Lymphocyte % 9.5 (*)     Lymphocytes, Absolute 0.57 (*)     All other components within normal limits   VALPROIC ACID LEVEL,  TOTAL - Abnormal; Notable for the following components:    Valproic Acid 45.4 (*)     All other components within normal limits   COVID-19 AND FLU A/B, NP SWAB IN TRANSPORT MEDIA 8-12 HR TAT - Normal    Narrative:     Fact sheet for providers: https://www.fda.gov/media/905390/download    Fact sheet for patients: https://www.fda.gov/media/448044/download    Test performed by PCR.   BNP (IN-HOUSE) - Normal    Narrative:     Among patients with dyspnea, NT-proBNP is highly sensitive for the detection of acute congestive heart failure. In addition NT-proBNP of <300 pg/ml effectively rules out acute congestive heart failure with 99% negative predictive value.    Results may be falsely decreased if patient taking Biotin.     D-DIMER, QUANTITATIVE - Normal    Narrative:     Dimer values <500 ng/ml FEU are FDA approved as aid in diagnosis of deep venous thrombosis and pulmonary embolism.  This test should not be used in an exclusion strategy with pretest probability alone.    A recent guideline regarding diagnosis for pulmonary thromboembolism recommends an adjusted exclusion criterion of age x 10 ng/ml FEU for patients >50 years of age (Lisa Intern Med 2015; 163: 701-711).     TROPONIN (IN-HOUSE) - Normal    Narrative:     Troponin T Reference Range:  <= 0.03 ng/mL-   Negative for AMI  >0.03 ng/mL-     Abnormal for myocardial necrosis.  Clinicians would have to utilize clinical acumen, EKG, Troponin and serial changes to determine if it is an Acute Myocardial Infarction or myocardial injury due to an underlying chronic condition.       Results may be falsely decreased if patient taking Biotin.     COMPREHENSIVE METABOLIC PANEL    Narrative:     GFR Normal >60  Chronic Kidney Disease <60  Kidney Failure <15     CBC AND DIFFERENTIAL    Narrative:     The following orders were created for panel order CBC & Differential.  Procedure                               Abnormality         Status                     ---------                                -----------         ------                     CBC Auto Differential[[id number]]        Abnormal            Final result                 Please view results for these tests on the individual orders.     XR Chest 1 View    Result Date: 12/13/2021  Narrative: PORTABLE CHEST X-RAY CLINICAL HISTORY: cough, dyspnea COMPARISON:  6/8/2021. FINDINGS:  Single portable view of the chest obtained.  There are various overlying monitor leads/artifacts in place. The lungs are well expanded and clear where they can be visualized.  Cardiac size is within normal limits.  Vascularity is normal considering technique.  No pleural fluid is demonstrated by portable imaging.  Stable aortic ectasia     Impression: No active disease by portable imaging. Electronically signed by:  Jose Telles MD  12/13/2021 6:46 AM [employer] Workstation: [id number]                                        [facility]    Final diagnoses:   Acute bronchitis, unspecified organism   Hypertension, unspecified type       ED Disposition  ED Disposition     ED Disposition Condition Comment    Discharge Stable           Errol Ruby MD  [address] [place] [address] [address] [place] [id number] [phone]    Schedule an appointment as soon as possible for a visit in 3 days           Medication List      New Prescriptions    benzonatate 100 MG capsule  Commonly known as: TESSALON  Take 1 capsule by mouth 3 (Three) Times a Day As Needed for Cough.     predniSONE 20 MG tablet  Commonly known as: DELTASONE  Take 1 tablet by mouth 2 (Two) Times a Day for 5 days.        Changed    albuterol sulfate  (90 Base) MCG/ACT inhaler  Commonly known as: PROVENTIL HFA;VENTOLIN HFA;PROAIR HFA  Inhale 2 puffs Every 6 (Six) Hours As Needed for Wheezing.  What changed:   · when to take this  · reasons to take this           Where to Get Your Medications      These medications were sent to [facility] [address] [place] [address] - [address] - [phone] PH -  [phone] FX  [address] [facility], [place] [address]    Phone: [phone]   · albuterol sulfate  (90 Base) MCG/ACT inhaler  · benzonatate 100 MG capsule  · predniSONE 20 MG tablet          Neftaly Sheth MD  12/13/21 3313

## 2021-12-15 DIAGNOSIS — R42 VERTIGO: ICD-10-CM

## 2021-12-15 RX ORDER — MECLIZINE HYDROCHLORIDE 25 MG/1
25 TABLET ORAL 3 TIMES DAILY PRN
Qty: 30 TABLET | Refills: 5 | Status: SHIPPED | OUTPATIENT
Start: 2021-12-15 | End: 2023-03-30

## 2021-12-15 RX ORDER — DIVALPROEX SODIUM 250 MG/1
500 TABLET, EXTENDED RELEASE ORAL DAILY
Qty: 180 TABLET | Refills: 3 | Status: SHIPPED | OUTPATIENT
Start: 2021-12-15 | End: 2022-12-19

## 2021-12-16 ENCOUNTER — LAB (OUTPATIENT)
Dept: LAB | Facility: HOSPITAL | Age: 76
End: 2021-12-16

## 2021-12-16 PROCEDURE — 84100 ASSAY OF PHOSPHORUS: CPT | Performed by: FAMILY MEDICINE

## 2021-12-16 PROCEDURE — 83735 ASSAY OF MAGNESIUM: CPT | Performed by: FAMILY MEDICINE

## 2021-12-16 PROCEDURE — 80053 COMPREHEN METABOLIC PANEL: CPT | Performed by: FAMILY MEDICINE

## 2021-12-20 ENCOUNTER — OFFICE VISIT (OUTPATIENT)
Dept: FAMILY MEDICINE CLINIC | Facility: CLINIC | Age: 76
End: 2021-12-20

## 2021-12-20 VITALS
WEIGHT: 96 LBS | SYSTOLIC BLOOD PRESSURE: 142 MMHG | OXYGEN SATURATION: 100 % | BODY MASS INDEX: 16.39 KG/M2 | HEIGHT: 64 IN | DIASTOLIC BLOOD PRESSURE: 84 MMHG | HEART RATE: 78 BPM

## 2021-12-20 DIAGNOSIS — R05.9 COUGH: Primary | ICD-10-CM

## 2021-12-20 DIAGNOSIS — J40 BRONCHITIS: ICD-10-CM

## 2021-12-20 DIAGNOSIS — J44.1 COPD WITH EXACERBATION (HCC): ICD-10-CM

## 2021-12-20 DIAGNOSIS — Z72.0 TOBACCO USE: Chronic | ICD-10-CM

## 2021-12-20 DIAGNOSIS — D84.9 IMMUNOCOMPROMISED (HCC): ICD-10-CM

## 2021-12-20 PROCEDURE — 99214 OFFICE O/P EST MOD 30 MIN: CPT | Performed by: FAMILY MEDICINE

## 2021-12-20 RX ORDER — IPRATROPIUM BROMIDE AND ALBUTEROL SULFATE 2.5; .5 MG/3ML; MG/3ML
3 SOLUTION RESPIRATORY (INHALATION) EVERY 4 HOURS PRN
Qty: 360 ML | Refills: 3 | Status: SHIPPED | OUTPATIENT
Start: 2021-12-20

## 2021-12-20 RX ORDER — PREDNISONE 20 MG/1
TABLET ORAL
Qty: 10 TABLET | Refills: 0 | Status: SHIPPED | OUTPATIENT
Start: 2021-12-20 | End: 2022-09-13

## 2021-12-20 RX ORDER — DOXYCYCLINE HYCLATE 100 MG/1
100 CAPSULE ORAL 2 TIMES DAILY
Qty: 20 CAPSULE | Refills: 0 | Status: SHIPPED | OUTPATIENT
Start: 2021-12-20 | End: 2022-09-13

## 2021-12-20 NOTE — PROGRESS NOTES
Subjective   Mary Anne Wolf is a 76 y.o. female.  ER follow-up seen was respiratory symptoms cough congestion coryza negative Covid negative x-rays treated with oral inhaler short-term antibiotic still coughing and congestion especially at night wheezing throughout rib out out of inhaler.  Continues to smoke but not as much.  History noted.  Including immunocompromise state.    History of Present Illness   HPI    The following portions of the patient's history were reviewed and updated as appropriate: allergies, current medications, past family history, past medical history, past social history, past surgical history and problem list.    Review of Systems  Review of Systems   Constitutional: Negative for activity change, appetite change, fatigue and unexpected weight change.   HENT: Positive for congestion. Negative for trouble swallowing and voice change.    Eyes: Negative for redness and visual disturbance.   Respiratory: Positive for cough. Negative for wheezing.    Cardiovascular: Negative for chest pain and palpitations.   Gastrointestinal: Negative for abdominal pain, constipation, diarrhea, nausea and vomiting.   Genitourinary: Negative for urgency.   Musculoskeletal: Negative for joint swelling.   Neurological: Negative for syncope and headaches.   Hematological: Negative for adenopathy.   Psychiatric/Behavioral: Negative for sleep disturbance.       Objective   Physical Exam  Physical Exam  Constitutional:       Appearance: Normal appearance. She is well-developed.   HENT:      Head: Normocephalic.      Right Ear: External ear normal.      Nose: Nose normal.   Eyes:      Pupils: Pupils are equal, round, and reactive to light.   Neck:      Thyroid: No thyromegaly.   Cardiovascular:      Rate and Rhythm: Normal rate and regular rhythm.      Heart sounds: Normal heart sounds. No murmur heard.  No friction rub. No gallop.    Pulmonary:      Effort: Prolonged expiration present.      Breath sounds:  "Examination of the right-lower field reveals wheezing and rhonchi. Examination of the left-lower field reveals wheezing and rhonchi. Wheezing and rhonchi present.      Comments: Minimally prolonged expiratory phase normal rate rhonchi and wheeze in the bases expiratory.  No accessory muscle move  Abdominal:      General: There is no distension.      Palpations: Abdomen is soft. There is no mass.      Tenderness: There is no abdominal tenderness.   Musculoskeletal:         General: Normal range of motion.      Cervical back: Normal range of motion.   Skin:     General: Skin is warm and dry.   Neurological:      Mental Status: She is alert and oriented to person, place, and time.      Deep Tendon Reflexes: Reflexes are normal and symmetric.   Psychiatric:         Attention and Perception: Attention normal.         Mood and Affect: Mood normal.         Speech: Speech normal.         Behavior: Behavior normal.         Thought Content: Thought content normal.         Cognition and Memory: Cognition normal.           Visit Vitals  /84   Ht 162.6 cm (64\")   Wt 43.5 kg (96 lb)   LMP  (LMP Unknown)   BMI 16.48 kg/m²     Body mass index is 16.48 kg/m².    /84   Pulse 78   Ht 162.6 cm (64\")   Wt 43.5 kg (96 lb)   LMP  (LMP Unknown)   SpO2 100%   BMI 16.48 kg/m²     Assessment/Plan   Diagnoses and all orders for this visit:    1. Cough (Primary)  -     Home Nebulizer  -     Pulse Oximetry, Spot    2. Bronchitis  -     Home Nebulizer  -     doxycycline (Vibramycin) 100 MG capsule; Take 1 capsule by mouth 2 (Two) Times a Day. With food till gone  Dispense: 20 capsule; Refill: 0  -     Pulse Oximetry, Spot    3. Tobacco use  -     Home Nebulizer    4. Immunocompromised (HCC)  -     Home Nebulizer  -     Pulse Oximetry, Spot    5. COPD with exacerbation ([facility])  -     ipratropium-albuterol (DUO-NEB) 0.5-2.5 mg/3 ml nebulizer; Take 3 mL by nebulization Every 4 (Four) Hours As Needed for Wheezing.  Dispense: 360 mL; " Refill: 3  -     doxycycline (Vibramycin) 100 MG capsule; Take 1 capsule by mouth 2 (Two) Times a Day. With food till gone  Dispense: 20 capsule; Refill: 0  -     predniSONE (DELTASONE) 20 MG tablet; 1 twice daily till gone  Dispense: 10 tablet; Refill: 0  -     Pulse Oximetry, Spot     on stopping smoking.  3-10m     Unseld mainly on lifestyle measures.  Use nebulizer for better efficiency with DuoNeb.  Short course of steroids antibiotics.  Counseled on lifestyle measures.  Follow-up as needed or keep regular March appointment

## 2021-12-21 RX ORDER — ALBUTEROL SULFATE 90 UG/1
2 AEROSOL, METERED RESPIRATORY (INHALATION) EVERY 6 HOURS PRN
Qty: 18 G | Refills: 5 | OUTPATIENT
Start: 2021-12-21

## 2021-12-21 RX ORDER — ALBUTEROL SULFATE 90 UG/1
2 AEROSOL, METERED RESPIRATORY (INHALATION) EVERY 6 HOURS PRN
Qty: 18 G | Refills: 5 | Status: SHIPPED | OUTPATIENT
Start: 2021-12-21

## 2021-12-22 ENCOUNTER — INFUSION (OUTPATIENT)
Dept: ONCOLOGY | Facility: HOSPITAL | Age: 76
End: 2021-12-22

## 2021-12-22 VITALS
SYSTOLIC BLOOD PRESSURE: 156 MMHG | HEART RATE: 85 BPM | RESPIRATION RATE: 16 BRPM | TEMPERATURE: 98 F | DIASTOLIC BLOOD PRESSURE: 75 MMHG

## 2021-12-22 DIAGNOSIS — M81.0 OSTEOPOROSIS, UNSPECIFIED OSTEOPOROSIS TYPE, UNSPECIFIED PATHOLOGICAL FRACTURE PRESENCE: Primary | ICD-10-CM

## 2021-12-22 PROCEDURE — 96372 THER/PROPH/DIAG INJ SC/IM: CPT | Performed by: NURSE PRACTITIONER

## 2021-12-22 PROCEDURE — 25010000002 DENOSUMAB 60 MG/ML SOLUTION PREFILLED SYRINGE: Performed by: FAMILY MEDICINE

## 2021-12-22 RX ADMIN — DENOSUMAB 60 MG: 60 INJECTION SUBCUTANEOUS at 14:05

## 2022-01-28 ENCOUNTER — TELEPHONE (OUTPATIENT)
Dept: FAMILY MEDICINE CLINIC | Facility: CLINIC | Age: 77
End: 2022-01-28

## 2022-01-28 NOTE — TELEPHONE ENCOUNTER
Mrs. Wolf called stating she is having problems with her ears being stopped up. She would like to have Dr Ruby call her in something to [facility]. Please call back @ [phone]

## 2022-03-15 ENCOUNTER — OFFICE VISIT (OUTPATIENT)
Dept: FAMILY MEDICINE CLINIC | Facility: CLINIC | Age: 77
End: 2022-03-15

## 2022-03-15 VITALS
WEIGHT: 99 LBS | DIASTOLIC BLOOD PRESSURE: 80 MMHG | HEIGHT: 64 IN | BODY MASS INDEX: 16.9 KG/M2 | SYSTOLIC BLOOD PRESSURE: 126 MMHG

## 2022-03-15 DIAGNOSIS — Z78.9 MEDICALLY COMPLEX PATIENT: Chronic | ICD-10-CM

## 2022-03-15 DIAGNOSIS — D84.9 IMMUNOCOMPROMISED: Chronic | ICD-10-CM

## 2022-03-15 DIAGNOSIS — R21 RASH OF HAND: ICD-10-CM

## 2022-03-15 DIAGNOSIS — M32.9 SYSTEMIC LUPUS ERYTHEMATOSUS, UNSPECIFIED SLE TYPE, UNSPECIFIED ORGAN INVOLVEMENT STATUS: Chronic | ICD-10-CM

## 2022-03-15 DIAGNOSIS — Z72.0 TOBACCO USE: Chronic | ICD-10-CM

## 2022-03-15 DIAGNOSIS — Z12.31 SCREENING MAMMOGRAM FOR BREAST CANCER: ICD-10-CM

## 2022-03-15 DIAGNOSIS — Z00.00 MEDICARE ANNUAL WELLNESS VISIT, SUBSEQUENT: ICD-10-CM

## 2022-03-15 DIAGNOSIS — Z13.220 SCREENING CHOLESTEROL LEVEL: ICD-10-CM

## 2022-03-15 DIAGNOSIS — I10 ESSENTIAL HYPERTENSION: Primary | Chronic | ICD-10-CM

## 2022-03-15 PROCEDURE — G0439 PPPS, SUBSEQ VISIT: HCPCS | Performed by: FAMILY MEDICINE

## 2022-03-15 PROCEDURE — 1126F AMNT PAIN NOTED NONE PRSNT: CPT | Performed by: FAMILY MEDICINE

## 2022-03-15 PROCEDURE — 1170F FXNL STATUS ASSESSED: CPT | Performed by: FAMILY MEDICINE

## 2022-03-15 PROCEDURE — 1159F MED LIST DOCD IN RCRD: CPT | Performed by: FAMILY MEDICINE

## 2022-03-15 PROCEDURE — 99214 OFFICE O/P EST MOD 30 MIN: CPT | Performed by: FAMILY MEDICINE

## 2022-03-15 RX ORDER — CLOBETASOL PROPIONATE 0.5 MG/G
CREAM TOPICAL
Qty: 30 G | Refills: 5 | Status: SHIPPED | OUTPATIENT
Start: 2022-03-15

## 2022-03-15 NOTE — PROGRESS NOTES
The ABCs of the Annual Wellness Visit  Subsequent Medicare Wellness Visit    Chief Complaint   Patient presents with   • Hypertension     6 month reval      Subjective    History of Present Illness:  Mary Anne Wolf is a 76 y.o. female who presents for a Subsequent Medicare Wellness Visit.    The following portions of the patient's history were reviewed and   updated as appropriate: allergies, current medications, past family history, past medical history, past social history, past surgical history and problem list.    Compared to one year ago, the patient feels her physical   health is the same.    Compared to one year ago, the patient feels her mental   health is the same.    Recent Hospitalizations:  She was not admitted to the hospital during the last year.       Current Medical Providers:  Patient Care Team:  Errol Ruby MD as PCP - General (Family Medicine)    Outpatient Medications Prior to Visit   Medication Sig Dispense Refill   • albuterol sulfate  (90 Base) MCG/ACT inhaler Inhale 2 puffs Every 6 (Six) Hours As Needed for Wheezing. 18 g 5   • amLODIPine (NORVASC) 2.5 MG tablet Take 1 tablet by mouth Daily. 90 tablet 3   • cetirizine (zyrTEC) 10 MG tablet Take 1 tablet by mouth Daily. 30 tablet 11   • divalproex (DEPAKOTE ER) 250 MG 24 hr tablet Take 2 tablets by mouth Daily. headaches 180 tablet 3   • doxycycline (Vibramycin) 100 MG capsule Take 1 capsule by mouth 2 (Two) Times a Day. With food till gone 20 capsule 0   • fluticasone (FLONASE) 50 MCG/ACT nasal spray 2 sprays into the nostril(s) as directed by provider Daily As Needed for Rhinitis.     • gabapentin (NEURONTIN) 800 MG tablet 1 twice daily for legs new dosing 180 tablet 2   • hydroxychloroquine (PLAQUENIL) 200 MG tablet Take 200 mg by mouth Daily. Take 2 on sundays     • ipratropium-albuterol (DUO-NEB) 0.5-2.5 mg/3 ml nebulizer Take 3 mL by nebulization Every 4 (Four) Hours As Needed for Wheezing. 360 mL 3   • lansoprazole  "(PREVACID) 30 MG capsule Take 30 mg by mouth Daily.     • meclizine (ANTIVERT) 25 MG tablet Take 1 tablet by mouth 3 (Three) Times a Day As Needed for Dizziness. 30 tablet 5   • predniSONE (DELTASONE) 20 MG tablet 1 twice daily till gone 10 tablet 0   • VITAMIN D PO Take 500 Units by mouth Daily.     • fluocinonide (LIDEX) 0.05 % external solution Apply  topically to the appropriate area as directed As Needed (1-2 drops in ears as needed for itching).       No facility-administered medications prior to visit.       No opioid medication identified on active medication list. I have reviewed chart for other potential  high risk medication/s and harmful drug interactions in the elderly.          Aspirin is not on active medication list.  Aspirin use is not indicated based on review of current medical condition/s. Risk of harm outweighs potential benefits.  .    Patient Active Problem List   Diagnosis   • Essential hypertension   • Neuropathy   • Vertigo   • Systemic lupus erythematosus (HCC)   • Borderline glaucoma   • Nuclear cataract   • Long-term use of hydroxychloroquine   • Osteoporosis, unspecified   • Tobacco use   • Gastroesophageal reflux disease   • Dysphagia   • Esophageal dysmotilities   • Migraine syndrome   • Immunocompromised (HCC)   • Medically complex patient     Advance Care Planning  Advance Directive is not on file.  ACP discussion was held with the patient during this visit. Patient does not have an advance directive, information provided.          Objective    Vitals:    03/15/22 0753   BP: 126/80   Weight: 44.9 kg (99 lb)   Height: 162.6 cm (64\")   PainSc: 0-No pain     BMI Readings from Last 1 Encounters:   03/15/22 16.99 kg/m²   BMI is below normal parameters. Recommendations include: none (medical contraindication)    Does the patient have evidence of cognitive impairment? No    Physical Exam            HEALTH RISK ASSESSMENT    Smoking Status:  Social History     Tobacco Use   Smoking Status " Current Every Day Smoker   • Types: Cigarettes   Smokeless Tobacco Never Used     Alcohol Consumption:  Social History     Substance and Sexual Activity   Alcohol Use No     Fall Risk Screen:    TRAV Fall Risk Assessment has not been completed.    Depression Screening:  PHQ-2/PHQ-9 Depression Screening 3/15/2022   Retired Total Score -   Little Interest or Pleasure in Doing Things 0-->not at all   Feeling Down, Depressed or Hopeless 0-->not at all   PHQ-9: Brief Depression Severity Measure Score 0       Health Habits and Functional and Cognitive Screening:  Functional & Cognitive Status 6/29/2017   Do you have difficulty preparing food and eating? No   Do you have difficulty bathing yourself, getting dressed or grooming yourself? No   Do you have difficulty using the toilet? No   Do you have difficulty moving around from place to place? Yes   Current Diet Frequent Junk Food   Dental Exam Not up to date   Eye Exam Up to date        Eye Exam Comment April 2017   Exercise (times per week) 2 times per week        Exercise Frequency Comment does some yard work    Current Exercise Activities Include Walking   Do you need help using the phone?  No   Are you deaf or do you have serious difficulty hearing?  No   Do you need help with transportation? No   Do you need help shopping? No   Do you need help preparing meals?  No   Do you need help with housework?  No   Do you need help with laundry? No   Do you need help taking your medications? No   Do you need help managing money? No       Age-appropriate Screening Schedule:  Refer to the list below for future screening recommendations based on patient's age, sex and/or medical conditions. Orders for these recommended tests are listed in the plan section. The patient has been provided with a written plan.    Health Maintenance   Topic Date Due   • MAMMOGRAM  10/08/2022   • TDAP/TD VACCINES (2 - Td or Tdap) 06/29/2027   • INFLUENZA VACCINE  Completed   • DXA SCAN  Discontinued    • ZOSTER VACCINE  Discontinued              Assessment/Plan   CMS Preventative Services Quick Reference  Risk Factors Identified During Encounter  Fall Risk-High or Moderate  Immunizations Discussed/Encouraged (specific Immunizations; COVID19  The above risks/problems have been discussed with the patient.  Follow up actions/plans if indicated are seen below in the Assessment/Plan Section.  Pertinent information has been shared with the patient in the After Visit Summary.    Diagnoses and all orders for this visit:    1. Essential hypertension (Primary)    2. Systemic lupus erythematosus, unspecified SLE type, unspecified organ involvement status (HCC)  -     clobetasol (Temovate) 0.05 % cream; Apply to hand twice daily for 10 days and as needed  Dispense: 30 g; Refill: 5    3. Immunocompromised (HCC)    4. Tobacco use    5. Medically complex patient    6. Medicare annual wellness visit, subsequent    7. Screening mammogram for breast cancer  -     Mammo Screening Digital Tomosynthesis Bilateral With CAD    8. Screening cholesterol level  -     Lipid Panel With LDL / HDL Ratio; Future    9. Rash of hand  -     clobetasol (Temovate) 0.05 % cream; Apply to hand twice daily for 10 days and as needed  Dispense: 30 g; Refill: 5        Follow Up:   No follow-ups on file.     An After Visit Summary and PPPS were made available to the patient.               The patient does not have a history of falls. I did complete a risk assessment for falls. A plan of care for falls was documented.

## 2022-03-15 NOTE — PROGRESS NOTES
Subjective   Mary Anne Wolf is a 76 y.o. female.  Reevaluation medically complex patient hypertension tobacco abuse lupus immunocompromised migraine syndrome diagnoses below in the interim is done fairly well continues to see rheumatology continues on Plaquenil.  Has developed some mild venous insufficiency.  Also developed some mild rash to the right hand looks lupus related and/or eczematous.  Needs treatment.  Last bronchitis is cleared.  Still smokes unfortuneately.  Has gained 3 pounds.  Is due for mammogram.  History noted.    History of Present Illness   HPI    The following portions of the patient's history were reviewed and updated as appropriate: allergies, current medications, past family history, past medical history, past social history, past surgical history and problem list.    Review of Systems  Review of Systems   Constitutional: Negative for activity change, appetite change, fatigue and unexpected weight change.   HENT: Negative for trouble swallowing and voice change.    Eyes: Negative for redness and visual disturbance.   Respiratory: Positive for cough (Chronic). Negative for wheezing.    Cardiovascular: Negative for chest pain and palpitations.   Gastrointestinal: Negative for abdominal pain, constipation, diarrhea, nausea and vomiting.   Genitourinary: Negative for urgency.   Musculoskeletal: Negative for joint swelling.   Skin: Positive for rash.   Neurological: Negative for syncope and headaches.   Hematological: Negative for adenopathy.   Psychiatric/Behavioral: Negative for sleep disturbance.       Objective   Physical Exam  Physical Exam  Constitutional:       Appearance: She is well-developed.   HENT:      Head: Normocephalic.   Eyes:      Pupils: Pupils are equal, round, and reactive to light.   Neck:      Thyroid: No thyromegaly.   Cardiovascular:      Rate and Rhythm: Normal rate and regular rhythm.      Heart sounds: Normal heart sounds. No murmur heard.    No friction rub. No  "gallop.   Pulmonary:      Effort: Pulmonary effort is normal.      Breath sounds: Normal breath sounds.   Abdominal:      General: There is no distension.      Palpations: Abdomen is soft. There is no mass.      Tenderness: There is no abdominal tenderness.   Musculoskeletal:         General: Normal range of motion.      Cervical back: Normal range of motion.      Comments: No peripheral edema by mild venous insufficiency 1+ pulses get up and go 3 to 5 seconds does use a cane to steady   Skin:     General: Skin is warm and dry.      Comments: Hand shows a raised almost plaque type lesion at the the hyperthenar eminence similar swelling around the middle finger cracking either consistent with a lupus and/or eczema.  Left hand clear.   Neurological:      Mental Status: She is alert and oriented to person, place, and time.      Deep Tendon Reflexes: Reflexes are normal and symmetric.   Psychiatric:         Mood and Affect: Affect is blunt.         Speech: Speech normal.         Behavior: Behavior normal.         Thought Content: Thought content normal.         Cognition and Memory: Cognition normal.           Visit Vitals  /80   Ht 162.6 cm (64\")   Wt 44.9 kg (99 lb)   LMP  (LMP Unknown)   BMI 16.99 kg/m²     Body mass index is 16.99 kg/m².  /80   Ht 162.6 cm (64\")   Wt 44.9 kg (99 lb)   LMP  (LMP Unknown)   BMI 16.99 kg/m²       Assessment/Plan   Diagnoses and all orders for this visit:    1. Essential hypertension (Primary)    2. Systemic lupus erythematosus, unspecified SLE type, unspecified organ involvement status (HCC)  -     clobetasol (Temovate) 0.05 % cream; Apply to hand twice daily for 10 days and as needed  Dispense: 30 g; Refill: 5    3. Immunocompromised (HCC)    4. Tobacco use    5. Medically complex patient    6. Medicare annual wellness visit, subsequent    7. Screening mammogram for breast cancer  -     Mammo Screening Digital Tomosynthesis Bilateral With CAD    8. Screening " cholesterol level  -     Lipid Panel With LDL / HDL Ratio; Future    9. Rash of hand  -     clobetasol (Temovate) 0.05 % cream; Apply to hand twice daily for 10 days and as needed  Dispense: 30 g; Refill: 5     on stopping smoking.  3-10m     Counseling using nebulizer at home.  Counseled cool water mild soap mid potency cream for the hand for 10 days if not improved let us know.  Orders as above recheck 6 months of note last cholesterol was well below normal.

## 2022-05-31 DIAGNOSIS — I10 ESSENTIAL HYPERTENSION: Chronic | ICD-10-CM

## 2022-05-31 RX ORDER — AMLODIPINE BESYLATE 2.5 MG/1
TABLET ORAL
Qty: 90 TABLET | Refills: 0 | Status: SHIPPED | OUTPATIENT
Start: 2022-05-31 | End: 2022-08-29

## 2022-06-20 ENCOUNTER — LAB (OUTPATIENT)
Dept: LAB | Facility: HOSPITAL | Age: 77
End: 2022-06-20

## 2022-06-20 DIAGNOSIS — Z13.220 SCREENING CHOLESTEROL LEVEL: ICD-10-CM

## 2022-06-20 PROCEDURE — 36415 COLL VENOUS BLD VENIPUNCTURE: CPT

## 2022-06-20 PROCEDURE — 80061 LIPID PANEL: CPT

## 2022-06-21 LAB
CHOLEST SERPL-MCNC: 121 MG/DL (ref 0–200)
HDLC SERPL-MCNC: 35 MG/DL (ref 40–60)
LDLC SERPL CALC-MCNC: 73 MG/DL (ref 0–100)
LDLC/HDLC SERPL: 2.13 {RATIO}
TRIGL SERPL-MCNC: 57 MG/DL (ref 0–150)
VLDLC SERPL-MCNC: 13 MG/DL (ref 5–40)

## 2022-06-27 ENCOUNTER — TELEPHONE (OUTPATIENT)
Dept: FAMILY MEDICINE CLINIC | Facility: CLINIC | Age: 77
End: 2022-06-27

## 2022-06-27 ENCOUNTER — APPOINTMENT (OUTPATIENT)
Dept: ONCOLOGY | Facility: HOSPITAL | Age: 77
End: 2022-06-27

## 2022-06-27 NOTE — TELEPHONE ENCOUNTER
Pt called said the [facility] had to cancel and reschedule her prolia as they say they have not received an order for her prolia     Pt [phone]

## 2022-07-05 ENCOUNTER — TELEPHONE (OUTPATIENT)
Dept: ONCOLOGY | Facility: CLINIC | Age: 77
End: 2022-07-05

## 2022-07-05 DIAGNOSIS — M81.0 OSTEOPOROSIS, UNSPECIFIED OSTEOPOROSIS TYPE, UNSPECIFIED PATHOLOGICAL FRACTURE PRESENCE: Primary | ICD-10-CM

## 2022-07-07 ENCOUNTER — TELEPHONE (OUTPATIENT)
Dept: ONCOLOGY | Facility: CLINIC | Age: 77
End: 2022-07-07

## 2022-07-07 NOTE — TELEPHONE ENCOUNTER
----- Message from Yolanda Julien sent at 7/5/2022  9:22 AM CDT -----  Regarding: RE: lab appt  Called pt no answer left message to have labs done for prolia  ----- Message -----  From: Toshia Lerner RN  Sent: 7/5/2022   8:50 AM CDT  To: [facility]  Subject: lab appt                                         Pt needs to have labs drawn for prolia this week. Please call pt with an appt. Thanks

## 2022-07-11 ENCOUNTER — APPOINTMENT (OUTPATIENT)
Dept: ONCOLOGY | Facility: HOSPITAL | Age: 77
End: 2022-07-11

## 2022-07-11 ENCOUNTER — TELEPHONE (OUTPATIENT)
Dept: ONCOLOGY | Facility: CLINIC | Age: 77
End: 2022-07-11

## 2022-07-11 DIAGNOSIS — M81.0 OSTEOPOROSIS, UNSPECIFIED OSTEOPOROSIS TYPE, UNSPECIFIED PATHOLOGICAL FRACTURE PRESENCE: Primary | ICD-10-CM

## 2022-07-11 DIAGNOSIS — M32.9 SYSTEMIC LUPUS ERYTHEMATOSUS, UNSPECIFIED SLE TYPE, UNSPECIFIED ORGAN INVOLVEMENT STATUS: Chronic | ICD-10-CM

## 2022-07-11 RX ORDER — GABAPENTIN 800 MG/1
TABLET ORAL
Qty: 180 TABLET | Refills: 2 | Status: SHIPPED | OUTPATIENT
Start: 2022-07-11 | End: 2023-03-21 | Stop reason: SDUPTHER

## 2022-07-11 NOTE — TELEPHONE ENCOUNTER
----- Message from Juanita Moncada RN sent at 7/11/2022 10:02 AM CDT -----  Regarding: this afternoons appt at 330  Pt does not have labs for prolia and order has not been placed and authed in therapy plan.  We need to call and reschedule her for labs and injection appt    Thank you

## 2022-07-18 ENCOUNTER — LAB (OUTPATIENT)
Dept: LAB | Facility: HOSPITAL | Age: 77
End: 2022-07-18

## 2022-07-18 DIAGNOSIS — M81.0 OSTEOPOROSIS, UNSPECIFIED OSTEOPOROSIS TYPE, UNSPECIFIED PATHOLOGICAL FRACTURE PRESENCE: ICD-10-CM

## 2022-07-18 LAB
ALBUMIN SERPL-MCNC: 3.7 G/DL (ref 3.5–5.2)
ALBUMIN/GLOB SERPL: 1.2 G/DL
ALP SERPL-CCNC: 58 U/L (ref 39–117)
ALT SERPL W P-5'-P-CCNC: 7 U/L (ref 1–33)
ANION GAP SERPL CALCULATED.3IONS-SCNC: 3 MMOL/L (ref 5–15)
AST SERPL-CCNC: 15 U/L (ref 1–32)
BILIRUB SERPL-MCNC: 0.3 MG/DL (ref 0–1.2)
BUN SERPL-MCNC: 12 MG/DL (ref 8–23)
BUN/CREAT SERPL: 10.4 (ref 7–25)
CALCIUM SPEC-SCNC: 9.8 MG/DL (ref 8.6–10.5)
CHLORIDE SERPL-SCNC: 100 MMOL/L (ref 98–107)
CO2 SERPL-SCNC: 34 MMOL/L (ref 22–29)
CREAT SERPL-MCNC: 1.15 MG/DL (ref 0.57–1)
EGFRCR SERPLBLD CKD-EPI 2021: 48.9 ML/MIN/1.73
GLOBULIN UR ELPH-MCNC: 3.2 GM/DL
GLUCOSE SERPL-MCNC: 95 MG/DL (ref 65–99)
PHOSPHATE SERPL-MCNC: 4 MG/DL (ref 2.5–4.5)
POTASSIUM SERPL-SCNC: 3.9 MMOL/L (ref 3.5–5.2)
PROT SERPL-MCNC: 6.9 G/DL (ref 6–8.5)
SODIUM SERPL-SCNC: 137 MMOL/L (ref 136–145)

## 2022-07-18 PROCEDURE — 84100 ASSAY OF PHOSPHORUS: CPT

## 2022-07-18 PROCEDURE — 80053 COMPREHEN METABOLIC PANEL: CPT

## 2022-07-25 ENCOUNTER — INFUSION (OUTPATIENT)
Dept: ONCOLOGY | Facility: HOSPITAL | Age: 77
End: 2022-07-25

## 2022-07-25 VITALS
TEMPERATURE: 97.4 F | RESPIRATION RATE: 16 BRPM | DIASTOLIC BLOOD PRESSURE: 69 MMHG | SYSTOLIC BLOOD PRESSURE: 135 MMHG | HEART RATE: 86 BPM

## 2022-07-25 DIAGNOSIS — M81.0 OSTEOPOROSIS, UNSPECIFIED OSTEOPOROSIS TYPE, UNSPECIFIED PATHOLOGICAL FRACTURE PRESENCE: Primary | ICD-10-CM

## 2022-07-25 PROCEDURE — 96372 THER/PROPH/DIAG INJ SC/IM: CPT | Performed by: NURSE PRACTITIONER

## 2022-07-25 PROCEDURE — 25010000002 DENOSUMAB 60 MG/ML SOLUTION PREFILLED SYRINGE: Performed by: NURSE PRACTITIONER

## 2022-07-25 RX ADMIN — DENOSUMAB 60 MG: 60 INJECTION SUBCUTANEOUS at 10:03

## 2022-08-29 DIAGNOSIS — I10 ESSENTIAL HYPERTENSION: Chronic | ICD-10-CM

## 2022-08-29 RX ORDER — AMLODIPINE BESYLATE 2.5 MG/1
TABLET ORAL
Qty: 90 TABLET | Refills: 0 | Status: SHIPPED | OUTPATIENT
Start: 2022-08-29 | End: 2022-11-28

## 2022-09-13 ENCOUNTER — OFFICE VISIT (OUTPATIENT)
Dept: FAMILY MEDICINE CLINIC | Facility: CLINIC | Age: 77
End: 2022-09-13

## 2022-09-13 VITALS
BODY MASS INDEX: 16.68 KG/M2 | DIASTOLIC BLOOD PRESSURE: 68 MMHG | WEIGHT: 97.7 LBS | SYSTOLIC BLOOD PRESSURE: 120 MMHG | HEIGHT: 64 IN

## 2022-09-13 DIAGNOSIS — I10 ESSENTIAL HYPERTENSION: Chronic | ICD-10-CM

## 2022-09-13 DIAGNOSIS — M17.12 OSTEOARTHROSIS, LOCALIZED, PRIMARY, KNEE, LEFT: ICD-10-CM

## 2022-09-13 DIAGNOSIS — G89.29 CHRONIC PAIN OF LEFT KNEE: ICD-10-CM

## 2022-09-13 DIAGNOSIS — Z12.31 SCREENING MAMMOGRAM FOR BREAST CANCER: ICD-10-CM

## 2022-09-13 DIAGNOSIS — Z72.0 TOBACCO USE: Chronic | ICD-10-CM

## 2022-09-13 DIAGNOSIS — Z78.9 MEDICALLY COMPLEX PATIENT: Primary | Chronic | ICD-10-CM

## 2022-09-13 DIAGNOSIS — M32.9 SYSTEMIC LUPUS ERYTHEMATOSUS, UNSPECIFIED SLE TYPE, UNSPECIFIED ORGAN INVOLVEMENT STATUS: Chronic | ICD-10-CM

## 2022-09-13 DIAGNOSIS — D84.9 IMMUNOCOMPROMISED: Chronic | ICD-10-CM

## 2022-09-13 DIAGNOSIS — M25.562 CHRONIC PAIN OF LEFT KNEE: ICD-10-CM

## 2022-09-13 PROCEDURE — 99214 OFFICE O/P EST MOD 30 MIN: CPT | Performed by: FAMILY MEDICINE

## 2022-09-13 NOTE — PROGRESS NOTES
Subjective   Mary Anne Wolf is a 77 y.o. female.  Reevaluation of medically complex patient hypertension tobacco abuse Lupus osteoporosis diagnoses below.  In the interim medicines have remained the same continues to see her rheumatologist continues on chronic immunosuppressant therapy for lupus which seems to be working.  Is behind on mammogram.  Has developed some pain in the left knee with what sounds like progressive arthritis.'s had surgery on the right knee in the distant past.    History of Present Illness   HPI    The following portions of the patient's history were reviewed and updated as appropriate: allergies, current medications, past family history, past medical history, past social history, past surgical history and problem list.    Review of Systems  Review of Systems   Constitutional: Positive for appetite change (Comes and goes multifactorial) and unexpected weight change (Up and down related to appetite). Negative for activity change and fatigue.   HENT: Negative for trouble swallowing and voice change.    Eyes: Negative for redness and visual disturbance.   Respiratory: Negative for cough and wheezing.    Cardiovascular: Negative for chest pain and palpitations.   Gastrointestinal: Negative for abdominal pain, constipation, diarrhea, nausea and vomiting.   Genitourinary: Negative for urgency.   Musculoskeletal: Positive for arthralgias, gait problem and joint swelling.   Neurological: Negative for syncope and headaches.   Hematological: Negative for adenopathy.   Psychiatric/Behavioral: Negative for sleep disturbance.       Objective   Physical Exam  Physical Exam  Constitutional:       Appearance: She is well-developed.   HENT:      Head: Normocephalic.   Eyes:      Pupils: Pupils are equal, round, and reactive to light.   Neck:      Thyroid: No thyromegaly.   Cardiovascular:      Rate and Rhythm: Normal rate and regular rhythm.      Heart sounds: Normal heart sounds. No murmur heard.    No  "friction rub. No gallop.   Pulmonary:      Breath sounds: Normal breath sounds.   Abdominal:      General: There is no distension.      Palpations: Abdomen is soft. There is no mass.      Tenderness: There is no abdominal tenderness.   Musculoskeletal:         General: Normal range of motion.      Cervical back: Normal range of motion.      Right knee: Bony tenderness present. Tenderness present over the medial joint line.      Comments: Right knee shows no new lesion left knee shows obvious hypertrophy of the medial compartment.  Crepitance popping clicking full range of motion.  The patellar is tracking.  Get up and go 3 to 5 seconds but with cane.  Edema 1+ pulses   Skin:     General: Skin is warm and dry.   Neurological:      Mental Status: She is alert and oriented to person, place, and time.      Deep Tendon Reflexes: Reflexes are normal and symmetric.   Psychiatric:         Attention and Perception: Attention normal.         Mood and Affect: Mood normal.         Speech: Speech normal.         Behavior: Behavior normal.         Cognition and Memory: Cognition normal.           Visit Vitals  /68   Ht 162.6 cm (64\")   Wt 44.3 kg (97 lb 11.2 oz)   LMP  (LMP Unknown)   BMI 16.77 kg/m²     Body mass index is 16.77 kg/m².  /68   Ht 162.6 cm (64\")   Wt 44.3 kg (97 lb 11.2 oz)   LMP  (LMP Unknown)   BMI 16.77 kg/m²       Assessment/Plan   Diagnoses and all orders for this visit:    1. Medically complex patient (Primary)    2. Essential hypertension    3. Systemic lupus erythematosus, unspecified SLE type, unspecified organ involvement status (HCC)    4. Immunocompromised (HCC)    5. Tobacco use    6. Chronic pain of left knee  -     Ambulatory Referral to Orthopedic Surgery    7. Osteoarthrosis, localized, primary, knee, left  -     Ambulatory Referral to Orthopedic Surgery    8. Screening mammogram for breast cancer  -     Mammo Screening Digital Tomosynthesis Bilateral With CAD     on stopping " smoking.  3-10m     Given history and rheumatologic history referral to orthopedics for progressive knee arthritis.  Counseled on same.  Counseled getting all 4 COVID boosters.  Counseled on high-dose flu vaccine in the fall.  Lifestyle measures discussed.  Orders as above recheck 6 months be time for subsequent Medicare

## 2022-09-30 DIAGNOSIS — G89.29 CHRONIC PAIN OF LEFT KNEE: Primary | ICD-10-CM

## 2022-09-30 DIAGNOSIS — M25.562 CHRONIC PAIN OF LEFT KNEE: Primary | ICD-10-CM

## 2022-10-06 ENCOUNTER — OFFICE VISIT (OUTPATIENT)
Dept: ORTHOPEDIC SURGERY | Facility: CLINIC | Age: 77
End: 2022-10-06

## 2022-10-06 VITALS — WEIGHT: 96 LBS | BODY MASS INDEX: 16.39 KG/M2 | HEIGHT: 64 IN

## 2022-10-06 DIAGNOSIS — I10 ESSENTIAL HYPERTENSION: Chronic | ICD-10-CM

## 2022-10-06 DIAGNOSIS — M17.12 PRIMARY OSTEOARTHRITIS OF LEFT KNEE: ICD-10-CM

## 2022-10-06 DIAGNOSIS — M32.9 SYSTEMIC LUPUS ERYTHEMATOSUS, UNSPECIFIED SLE TYPE, UNSPECIFIED ORGAN INVOLVEMENT STATUS: Chronic | ICD-10-CM

## 2022-10-06 DIAGNOSIS — M25.562 CHRONIC PAIN OF LEFT KNEE: Primary | ICD-10-CM

## 2022-10-06 DIAGNOSIS — G89.29 CHRONIC PAIN OF LEFT KNEE: Primary | ICD-10-CM

## 2022-10-06 PROCEDURE — 20610 DRAIN/INJ JOINT/BURSA W/O US: CPT | Performed by: ORTHOPAEDIC SURGERY

## 2022-10-06 PROCEDURE — 99204 OFFICE O/P NEW MOD 45 MIN: CPT | Performed by: ORTHOPAEDIC SURGERY

## 2022-10-06 RX ADMIN — TRIAMCINOLONE ACETONIDE 40 MG: 40 INJECTION, SUSPENSION INTRA-ARTICULAR; INTRAMUSCULAR at 11:39

## 2022-10-06 RX ADMIN — BUPIVACAINE HYDROCHLORIDE 2 ML: 5 INJECTION, SOLUTION PERINEURAL at 11:39

## 2022-10-06 NOTE — PROGRESS NOTES
Mary Anne Wolf is a 77 y.o. female   Primary provider:  Errol Ruby MD       Chief Complaint   Patient presents with   • Left Knee - Pain   • Establish Care       HISTORY OF PRESENT ILLNESS: Patient being seen for left knee pain. X-rays done today.   Patient having progressive pain in the left knee.  No specific injury.  Pain has been slowly getting worse.  Mostly dull achy pain but occasional sharp pains as well.  No fevers or chills.  No numbness or tingling.    Pain  This is a new problem. The current episode started more than 1 month ago. Associated symptoms include chills and a fever. Associated symptoms comments: Aching, clickingh. The symptoms are aggravated by standing. She has tried heat and ice for the symptoms.        CONCURRENT MEDICAL HISTORY:    Past Medical History:   Diagnosis Date   • Acute posthemorrhagic anemia    • Arthritis    • Backache     LLE radiculopathy   • Cigarette smoker    • Discoid lupus erythematosus    • Diverticular disease of colon     incomplete colonoscopy to Hepatic flexure.  Rec ACBE, pt. considering   • Dizziness and giddiness    • Dysphagia    • Esophagitis     grade III   • Gastroesophageal reflux disease     rec pt try RX prilosec   • H/O screening mammography    • Localization-related (focal) (partial) symptomatic epilepsy and epileptic syndromes with complex partial seizures, intractable, without status epilepticus (HCC)    • Neuropathy    • Neuropathy    • Nicotine dependence    • Osteopenia    • Periumbilical pain    • Seizure disorder (HCC)    • Seizure disorder (HCC)     history of complex partial seizure   • Stricture of esophagus    • Systemic lupus erythematosus (HCC)    • Vertigo    • Vertigo        Allergies   Allergen Reactions   • Milk-Related Compounds GI Intolerance         Current Outpatient Medications:   •  albuterol sulfate  (90 Base) MCG/ACT inhaler, Inhale 2 puffs Every 6 (Six) Hours As Needed for Wheezing., Disp: 18 g, Rfl: 5  •   amLODIPine (NORVASC) 2.5 MG tablet, Take 1 tablet by mouth once daily, Disp: 90 tablet, Rfl: 0  •  cetirizine (zyrTEC) 10 MG tablet, Take 1 tablet by mouth Daily., Disp: 30 tablet, Rfl: 11  •  clobetasol (Temovate) 0.05 % cream, Apply to hand twice daily for 10 days and as needed, Disp: 30 g, Rfl: 5  •  divalproex (DEPAKOTE ER) 250 MG 24 hr tablet, Take 2 tablets by mouth Daily. headaches, Disp: 180 tablet, Rfl: 3  •  fluticasone (FLONASE) 50 MCG/ACT nasal spray, 2 sprays into the nostril(s) as directed by provider Daily As Needed for Rhinitis., Disp: , Rfl:   •  gabapentin (NEURONTIN) 800 MG tablet, 1 twice daily for legs new dosing, Disp: 180 tablet, Rfl: 2  •  ipratropium-albuterol (DUO-NEB) 0.5-2.5 mg/3 ml nebulizer, Take 3 mL by nebulization Every 4 (Four) Hours As Needed for Wheezing., Disp: 360 mL, Rfl: 3  •  lansoprazole (PREVACID) 30 MG capsule, Take 30 mg by mouth Daily., Disp: , Rfl:   •  meclizine (ANTIVERT) 25 MG tablet, Take 1 tablet by mouth 3 (Three) Times a Day As Needed for Dizziness., Disp: 30 tablet, Rfl: 5  •  VITAMIN D PO, Take 500 Units by mouth Daily., Disp: , Rfl:   •  meloxicam (MOBIC) 15 MG tablet, 1 PO Daily with food., Disp: 30 tablet, Rfl: 3    Past Surgical History:   Procedure Laterality Date   • CATARACT EXTRACTION W/ INTRAOCULAR LENS IMPLANT Right 4/23/2021    Procedure: REMOVE CATARACT AND IMPLANT INTRAOCULAR LENS;  Surgeon: Trenton Peres MD;  Location: [facility] OR;  Service: Ophthalmology;  Laterality: Right;   • CATARACT EXTRACTION W/ INTRAOCULAR LENS IMPLANT Left 4/30/2021    Procedure: REMOVE CATARACT AND IMPLANT  INTRAOCULAR LENS;  Surgeon: Trenton Peres MD;  Location: [facility] OR;  Service: Ophthalmology;  Laterality: Left;   • COLONOSCOPY  08/05/2015   • COLONOSCOPY  07/08/2014    REFUSED BY PATIENT   • COLONOSCOPY  08/05/2015    a diverticulum was found in the sigmoid colon   • ENDOSCOPY  08/05/2015    Esophageal stricture was present. Dilatatin was  "performed. Esophagitis . Biopsy taken. Normal stomach. Normal duodenum   • ENDOSCOPY N/A 12/6/2018    Procedure: ESOPHAGOGASTRODUODENOSCOPY;  Surgeon: Cory Bliss DO;  Location: [facility] ENDOSCOPY;  Service: Gastroenterology   • UPPER GASTROINTESTINAL ENDOSCOPY  08/05/2015       Family History   Problem Relation Age of Onset   • Lupus Mother    • Rheum arthritis Mother    • Lupus Sister        Social History     Socioeconomic History   • Marital status:    Tobacco Use   • Smoking status: Every Day     Packs/day: 0.25     Types: Cigarettes   • Smokeless tobacco: Never   Substance and Sexual Activity   • Alcohol use: No   • Drug use: No   • Sexual activity: Defer        Review of Systems   Constitutional: Positive for chills and fever.   Respiratory: Negative.    Cardiovascular: Negative.    Gastrointestinal: Negative.    Musculoskeletal:        Left knee pain   All other systems reviewed and are negative.      PHYSICAL EXAMINATION:       Ht 162.6 cm (64\")   Wt 43.5 kg (96 lb)   LMP  (LMP Unknown)   BMI 16.48 kg/m²     Physical Exam  Constitutional:       General: She is not in acute distress.     Appearance: Normal appearance.   Pulmonary:      Effort: Pulmonary effort is normal. No respiratory distress.   Neurological:      Mental Status: She is alert and oriented to person, place, and time.         GAIT:     []  Normal  [x]  Antalgic    Assistive device: []  None  []  Walker     []  Crutches  [x]  Cane     []  Wheelchair  []  Stretcher    Left Knee Exam     Muscle Strength   The patient has normal left knee strength.    Tenderness   Left knee tenderness location: diffuse.    Range of Motion   Extension: -5   Flexion: 120     Tests   Varus: negative Valgus: negative  Drawer:  Anterior - negative     Posterior - negative    Other   Sensation: normal  Pulse: present  Swelling: none    Comments:  Crepitation with motion  Mild to moderate pain through arc of motion                    XR Knee 1 or 2 View " Left    Result Date: 10/6/2022  Narrative: Ordering Provider:  Zack Lewis MD Ordering Diagnosis/Indication:  Chronic pain of left knee Procedure:  XR KNEE 1 OR 2 VW LEFT Exam Date:  10/6/22 COMPARISON:  Not applicable, no relevant images available.     Impression:  AP bilateral standing of the knees with lateral of the left knee show acceptable position and alignment of a right total knee arthroplasty.  Appropriate sizing noted on each view.  No sign of implant loosening or failure.  The left knee shows moderate to severe arthritic changes with slight valgus positioning of the knee.  Significant arthritic changes are noted in the lateral compartment with marginal osteophytes.  Mild marginal osteophytes also present along the medial aspect of the joint.  Moderate arthritic changes noted in the patellofemoral compartment. Stable right total knee arthroplasty with end stage osteoarthritic changes in the left knee. Zack Lewis MD 10/6/22     XR Knee Bilateral AP Standing    Result Date: 10/6/2022  Narrative: Ordering Provider:  Zack Lewis MD Ordering Diagnosis/Indication:  Chronic pain of left knee Procedure:  XR KNEE BILATERAL AP STANDING Exam Date:  10/6/22 COMPARISON:  Not applicable, no relevant images available.     Impression:  AP bilateral standing of the knees with lateral of the left knee show acceptable position and alignment of a right total knee arthroplasty.  Appropriate sizing noted on each view.  No sign of implant loosening or failure.  The left knee shows moderate to severe arthritic changes with slight valgus positioning of the knee.  Significant arthritic changes are noted in the lateral compartment with marginal osteophytes.  Mild marginal osteophytes also present along the medial aspect of the joint.  Moderate arthritic changes noted in the patellofemoral compartment. Stable right total knee arthroplasty with end stage osteoarthritic changes in the left knee.  Zack Lewis MD 10/6/22           ASSESSMENT:    Diagnoses and all orders for this visit:    Chronic pain of left knee  -     Large Joint Arthrocentesis: L knee  -     Ambulatory Referral to Physical Therapy    Essential hypertension    Systemic lupus erythematosus, unspecified SLE type, unspecified organ involvement status (HCC)    Primary osteoarthritis of left knee  -     Large Joint Arthrocentesis: L knee  -     Ambulatory Referral to Physical Therapy  -     meloxicam (MOBIC) 15 MG tablet; 1 PO Daily with food.          PLAN    Long discussion was had with the patient regarding further treatment options.  She has previous right total knee arthroplasty.  She has pain in the left knee consistent with primary osteoarthritis.  We discussed slowly progressing activity as tolerated.  We will proceed with a steroid injection today.  We discussed the possibility of eventual total knee arthroplasty.  Continue general strength and conditioning exercises.  Begin meloxicam for anti-inflammatory use.  Follow-up in 8 weeks.    PT:  2 visit for ROM/STR/HEP for OA left knee.  Injected today    Large Joint Arthrocentesis: L knee  Date/Time: 10/6/2022 11:39 AM  Consent given by: patient  Site marked: site marked  Timeout: Immediately prior to procedure a time out was called to verify the correct patient, procedure, equipment, support staff and site/side marked as required   Supporting Documentation  Indications: pain   Procedure Details  Location: knee - L knee  Preparation: Patient was prepped and draped in the usual sterile fashion  Needle size: 22 G  Approach: anteromedial  Medications administered: 40 mg triamcinolone acetonide 40 MG/ML; 2 mL bupivacaine 0.5 %  Patient tolerance: patient tolerated the procedure well with no immediate complications          meloxicam  PT:  2 visit for ROM/STR/HEP for OA left knee.  Injected today    Return in about 8 weeks (around 12/1/2022) for recheck.    Zack Lewis  MD

## 2022-10-07 ENCOUNTER — TELEPHONE (OUTPATIENT)
Dept: ORTHOPEDIC SURGERY | Facility: CLINIC | Age: 77
End: 2022-10-07

## 2022-10-07 NOTE — TELEPHONE ENCOUNTER
DR WITT.  PATIENT CALLED CHECKING TTO SEE IF HE PRESCRIPTION FOR MELOXICAM HAD BEEN CALLED IN?  PLEASE CALL IN AT [facility].

## 2022-10-09 RX ORDER — MELOXICAM 15 MG/1
TABLET ORAL
Qty: 30 TABLET | Refills: 3 | Status: SHIPPED | OUTPATIENT
Start: 2022-10-09

## 2022-10-09 RX ORDER — TRIAMCINOLONE ACETONIDE 40 MG/ML
40 INJECTION, SUSPENSION INTRA-ARTICULAR; INTRAMUSCULAR
Status: COMPLETED | OUTPATIENT
Start: 2022-10-06 | End: 2022-10-06

## 2022-10-09 RX ORDER — BUPIVACAINE HYDROCHLORIDE 5 MG/ML
2 INJECTION, SOLUTION PERINEURAL
Status: COMPLETED | OUTPATIENT
Start: 2022-10-06 | End: 2022-10-06

## 2022-10-24 ENCOUNTER — APPOINTMENT (OUTPATIENT)
Dept: PHYSICAL THERAPY | Facility: HOSPITAL | Age: 77
End: 2022-10-24

## 2022-10-25 ENCOUNTER — HOSPITAL ENCOUNTER (OUTPATIENT)
Dept: PHYSICAL THERAPY | Facility: HOSPITAL | Age: 77
Setting detail: THERAPIES SERIES
Discharge: HOME | End: 2022-10-25

## 2022-10-25 DIAGNOSIS — M17.12 PRIMARY OSTEOARTHRITIS OF LEFT KNEE: ICD-10-CM

## 2022-10-25 DIAGNOSIS — G89.29 CHRONIC PAIN OF LEFT KNEE: Primary | ICD-10-CM

## 2022-10-25 DIAGNOSIS — M25.562 CHRONIC PAIN OF LEFT KNEE: Primary | ICD-10-CM

## 2022-10-25 PROCEDURE — 97161 PT EVAL LOW COMPLEX 20 MIN: CPT | Performed by: PHYSICAL THERAPIST

## 2022-10-25 NOTE — THERAPY EVALUATION
Outpatient Physical Therapy Ortho Initial Evaluation  [facility]     Patient Name: Mary Anne Wolf  : 1945  MRN: [id number]  Today's Date: 10/25/2022      Visit Date: 10/25/2022  Visit number:     % Improvement:   iman VILLALOBOS appointment:   No fu scheduled  Recert date:  22    Visit Diagnosis:  Left knee OA        Patient Active Problem List   Diagnosis   • Essential hypertension   • Neuropathy   • Vertigo   • Systemic lupus erythematosus (HCC)   • Borderline glaucoma   • Nuclear cataract   • Long-term use of hydroxychloroquine   • Osteoporosis, unspecified   • Tobacco use   • Gastroesophageal reflux disease   • Dysphagia   • Esophageal dysmotilities   • Migraine syndrome   • Immunocompromised (HCC)   • Medically complex patient        Past Medical History:   Diagnosis Date   • Acute posthemorrhagic anemia    • Arthritis    • Backache     LLE radiculopathy   • Cigarette smoker    • Discoid lupus erythematosus    • Diverticular disease of colon     incomplete colonoscopy to Hepatic flexure.  Rec ACBE, pt. considering   • Dizziness and giddiness    • Dysphagia    • Esophagitis     grade III   • Gastroesophageal reflux disease     rec pt try RX prilosec   • H/O screening mammography    • Localization-related (focal) (partial) symptomatic epilepsy and epileptic syndromes with complex partial seizures, intractable, without status epilepticus (HCC)    • Neuropathy    • Neuropathy    • Nicotine dependence    • Osteopenia    • Periumbilical pain    • Seizure disorder (HCC)    • Seizure disorder (HCC)     history of complex partial seizure   • Stricture of esophagus    • Systemic lupus erythematosus (HCC)    • Vertigo    • Vertigo         Past Surgical History:   Procedure Laterality Date   • CATARACT EXTRACTION W/ INTRAOCULAR LENS IMPLANT Right 2021    Procedure: REMOVE CATARACT AND IMPLANT INTRAOCULAR LENS;  Surgeon: Trenton Peres MD;  Location: [facility];  Service: Ophthalmology;   Laterality: Right;   • CATARACT EXTRACTION W/ INTRAOCULAR LENS IMPLANT Left 4/30/2021    Procedure: REMOVE CATARACT AND IMPLANT  INTRAOCULAR LENS;  Surgeon: Trenton Peres MD;  Location: [facility] OR;  Service: Ophthalmology;  Laterality: Left;   • COLONOSCOPY  08/05/2015   • COLONOSCOPY  07/08/2014    REFUSED BY PATIENT   • COLONOSCOPY  08/05/2015    a diverticulum was found in the sigmoid colon   • ENDOSCOPY  08/05/2015    Esophageal stricture was present. Dilatatin was performed. Esophagitis . Biopsy taken. Normal stomach. Normal duodenum   • ENDOSCOPY N/A 12/6/2018    Procedure: ESOPHAGOGASTRODUODENOSCOPY;  Surgeon: Cory Bliss DO;  Location: [facility] ENDOSCOPY;  Service: Gastroenterology   • UPPER GASTROINTESTINAL ENDOSCOPY  08/05/2015       Visit Dx:     ICD-10-CM ICD-9-CM   1. Chronic pain of left knee  M25.562 719.46    G89.29 338.29   2. Primary osteoarthritis of left knee  M17.12 715.16          Patient History     Row Name 10/25/22 1000             History    Chief Complaint Pain  -SW      Brief Description of Current Complaint Patient reports chronic left knee pain which has recently escalated. Occasionally left knee will give away. She a right TKA several years ago. She was recently given an injection in left knee and Meloxicam which patient feels has helped with pain. She has a history of Lupus. Left knee pain increases with standing and walking. Patient lives with . She has a few steps into home and no stairs in home.  -SW      Occupation/sports/leisure activities housework  -SW      Results of Clinical Tests X-ray 10-6-22 end stage OA left knee  -SW         Pain     Pain Location Knee  -SW      Pain at Present 3  -SW      Pain at Worst 5  -SW      Is your sleep disturbed? No  -SW      Is medication used to assist with sleep? No  -SW         Fall Risk Assessment    Any falls in the past year: No  -SW         Daily Activities    Primary Language English  -SW            User Key  (r) =  Recorded By, (t) = Taken By, (c) = Cosigned By    Initials Name Provider Type    SW Autumn Edwards Physical Therapist                 PT Ortho     Row Name 10/25/22 1000       Posture/Observations    Posture/Observations Comments ambulation without AD with good gait mechanics, genu vlagus and pronation on left  -SW       General ROM    LT Lower Ext Lt Knee Extension/Flexion  -SW       Left Lower Ext    Lt Knee Extension/Flexion AROM -2-135 (pain at end range flexion)  -SW       MMT (Manual Muscle Testing)    Rt Lower Ext Rt Hip Flexion;Rt Hip Extension;Rt Hip ABduction;Rt Hip Internal (Medial) Rotation;Rt Hip External (Lateral) Rotation;Rt Knee Extension;Rt Knee Flexion;Rt Ankle Plantarflexion;Rt Ankle Dorsiflexion  -SW    Lt Lower Ext Lt Hip Flexion;Lt Hip Extension;Lt Hip ABduction;Lt Hip Internal (Medial) Rotation;Lt Hip External (Lateral) Rotation;Lt Knee Extension;Lt Knee Flexion;Lt Ankle Plantarflexion;Lt Ankle Dorsiflexion  -SW       MMT Right Lower Ext    Rt Hip Flexion MMT, Gross Movement (4-/5) good minus  -SW    Rt Hip Extension MMT, Gross Movement (3+/5) fair plus  -SW    Rt Hip ABduction MMT, Gross Movement (3+/5) fair plus  -SW    Rt Hip Internal (Medial) Rotation MMT, Gross Movement (3+/5) fair plus  -SW    Rt Hip External (Lateral) Rotation MMT, Gross Movement (5/5) normal  -SW    Rt Knee Extension MMT, Gross Movement (4+/5) good plus  -SW    Rt Knee Flexion MMT, Gross Movement (4+/5) good plus  -SW    Rt Ankle Plantarflexion MMT, Gross Movement (4/5) good  -SW    Rt Ankle Dorsiflexion MMT, Gross Movement (4+/5) good plus  -SW       MMT Left Lower Ext    Lt Hip Flexion MMT, Gross Movement (3+/5) fair plus  -SW    Lt Hip Extension MMT, Gross Movement (3+/5) fair plus  -SW    Lt Hip ABduction MMT, Gross Movement (3+/5) fair plus  -SW    Lt Hip Internal (Medial) Rotation MMT, Gross Movement (3+/5) fair plus  -SW    Lt Hip External (Lateral) Rotation MMT, Gross Movement (4+/5) good plus  -SW    Lt Knee  Extension MMT, Gross Movement (4+/5) good plus  -SW    Lt Knee Flexion MMT, Gross Movement (4+/5) good plus  -SW    Lt Ankle Plantarflexion MMT, Gross Movement (4/5) good  -SW    Lt Ankle Dorsiflexion MMT, Gross Movement (4+/5) good plus  -SW          User Key  (r) = Recorded By, (t) = Taken By, (c) = Cosigned By    Initials Name Provider Type    Autumn Lackey Physical Therapist                            Therapy Education  Education Details: bridge,slrx3,sit to stand,marching in standing,hr,tr,sls,tandem stance  Given: HEP, Symptoms/condition management  Program: New  How Provided: Verbal, Demonstration, Written  Provided to: Patient  Level of Understanding: Teach back education performed, Verbalized, Demonstrated      PT OP Goals     Row Name 10/25/22 1000          PT Short Term Goals    STG Date to Achieve 11/08/22  -SW     STG 1 independent and compliant with hep  -        Time Calculation    PT Goal Re-Cert Due Date 11/15/22  -           User Key  (r) = Recorded By, (t) = Taken By, (c) = Cosigned By    Initials Name Provider Type    Autumn Lackey Physical Therapist                 PT Assessment/Plan     Row Name 10/25/22 1000          PT Assessment    Functional Limitations Impaired gait;Impaired locomotion;Limitation in home management;Limitations in community activities;Limitations in functional capacity and performance;Performance in leisure activities;Performance in self-care ADL  -     Impairments Balance;Gait;Impaired muscle endurance;Muscle strength;Pain;Poor body mechanics  -     Assessment Comments 77 yof presents with X-ray evidence of end juan OA left knee and history of right TKA. She exhibits weakness throughout bilateral LE's as well as decreased balance. She was given 10 exercises to be performed most days of the week. Patient had some difficulty performing exercises correctly.  -SW     Rehab Potential Fair  -SW     Patient/caregiver participated in establishment of treatment plan  "and goals Yes  -SW     Patient would benefit from skilled therapy intervention Yes  -SW        PT Plan    PT Frequency 1x/week  -SW     Predicted Duration of Therapy Intervention (PT) 2 visits total per referral  -SW     Planned CPT's? PT EVAL LOW COMPLEXITY: 60623;PT THER PROC EA 15 MIN: 43753;PT THER ACT EA 15 MIN: 88992;PT MANUAL THERAPY EA 15 MIN: 73907;PT NEUROMUSC RE-EDUCATION EA 15 MIN: 17605;PT SELF CARE/HOME MGMT/TRAIN EA 15: 11995;PT HOT OR COLD PACK TREAT [facility]  -SW     Physical Therapy Interventions (Optional Details) balance training;home exercise program;manual therapy techniques;modalities;neuromuscular re-education;patient/family education;stair training;strengthening;stretching  -SW     PT Plan Comments Follow up for review of HEP. Please do not add any additional exercises unless current exercises are causing issues.  -SW           User Key  (r) = Recorded By, (t) = Taken By, (c) = Cosigned By    Initials Name Provider Type    Autumn Lackey Physical Therapist                   OP Exercises     Row Name 10/25/22 1000             Subjective Comments    Subjective Comments see pt hx  -SW         Exercise 1    Exercise Name 1 bridge  -SW      Reps 1 10  -SW         Exercise 2    Exercise Name 2 SLR 3 way B  -SW      Reps 2 10 ea way ea side  -SW         Exercise 3    Exercise Name 3 sit to stand  -SW      Reps 3 10  -SW         Exercise 4    Exercise Name 4 marching in standing  -SW      Reps 4 20  -SW         Exercise 5    Exercise Name 5 HR and TR  -SW      Reps 5 10 ea  -SW         Exercise 6    Exercise Name 6 SLS  -SW      Reps 6 10\"x2 ea side  -SW         Exercise 7    Exercise Name 7 tandem stance  -SW      Reps 7 10\"x2 right foot in front and left foot in front  -SW            User Key  (r) = Recorded By, (t) = Taken By, (c) = Cosigned By    Initials Name Provider Type    Autumn Lackey Physical Therapist                              Outcome Measure Options: 5x Sit to Stand  5 Times Sit to " Stand  5 Times Sit to Stand (seconds): 22 seconds  5 Times Sit to Stand Comments: no UE support         Time Calculation:     Start Time: 1016  Stop Time: 1050  Time Calculation (min): 34 min     Therapy Charges for Today     Code Description Service Date Service Provider Modifiers Qty    [id number] HC PT EVAL LOW COMPLEXITY 3 10/25/2022 Autumn Edwards GP 1          PT G-Codes  Outcome Measure Options: 5x Sit to Stand         Autumn Edwards  10/25/2022

## 2022-11-04 DIAGNOSIS — R19.7 DIARRHEA, UNSPECIFIED TYPE: Primary | ICD-10-CM

## 2022-11-04 RX ORDER — DICYCLOMINE HYDROCHLORIDE 10 MG/1
CAPSULE ORAL
Qty: 20 CAPSULE | Refills: 1 | Status: SHIPPED | OUTPATIENT
Start: 2022-11-04 | End: 2022-11-09 | Stop reason: SDUPTHER

## 2022-11-07 ENCOUNTER — HOSPITAL ENCOUNTER (OUTPATIENT)
Dept: PHYSICAL THERAPY | Facility: HOSPITAL | Age: 77
Setting detail: THERAPIES SERIES
Discharge: HOME | End: 2022-11-07

## 2022-11-07 DIAGNOSIS — M17.12 PRIMARY OSTEOARTHRITIS OF LEFT KNEE: ICD-10-CM

## 2022-11-07 DIAGNOSIS — G89.29 CHRONIC PAIN OF LEFT KNEE: Primary | ICD-10-CM

## 2022-11-07 DIAGNOSIS — M25.562 CHRONIC PAIN OF LEFT KNEE: Primary | ICD-10-CM

## 2022-11-07 PROCEDURE — 97110 THERAPEUTIC EXERCISES: CPT

## 2022-11-07 NOTE — THERAPY DISCHARGE NOTE
Outpatient Physical Therapy Ortho Treatment Note/Discharge Summary  [facility]     Patient Name: Mary Anne Wolf  : 1945  MRN: [id number]  Today's Date: 2022      Visit Date: 2022   Attendance:  of 4  Subjective improvement: n/a  Recert: 22  MD Appointment: TBD      Visit Dx:    ICD-10-CM ICD-9-CM   1. Chronic pain of left knee  M25.562 719.46    G89.29 338.29   2. Primary osteoarthritis of left knee  M17.12 715.16       Patient Active Problem List   Diagnosis   • Essential hypertension   • Neuropathy   • Vertigo   • Systemic lupus erythematosus (HCC)   • Borderline glaucoma   • Nuclear cataract   • Long-term use of hydroxychloroquine   • Osteoporosis, unspecified   • Tobacco use   • Gastroesophageal reflux disease   • Dysphagia   • Esophageal dysmotilities   • Migraine syndrome   • Immunocompromised (HCC)   • Medically complex patient        Past Medical History:   Diagnosis Date   • Acute posthemorrhagic anemia    • Arthritis    • Backache     LLE radiculopathy   • Cigarette smoker    • Discoid lupus erythematosus    • Diverticular disease of colon     incomplete colonoscopy to Hepatic flexure.  Rec ACBE, pt. considering   • Dizziness and giddiness    • Dysphagia    • Esophagitis     grade III   • Gastroesophageal reflux disease     rec pt try RX prilosec   • H/O screening mammography    • Localization-related (focal) (partial) symptomatic epilepsy and epileptic syndromes with complex partial seizures, intractable, without status epilepticus (HCC)    • Neuropathy    • Neuropathy    • Nicotine dependence    • Osteopenia    • Periumbilical pain    • Seizure disorder (HCC)    • Seizure disorder (HCC)     history of complex partial seizure   • Stricture of esophagus    • Systemic lupus erythematosus (HCC)    • Vertigo    • Vertigo         Past Surgical History:   Procedure Laterality Date   • CATARACT EXTRACTION W/ INTRAOCULAR LENS IMPLANT Right 2021    Procedure: REMOVE  CATARACT AND IMPLANT INTRAOCULAR LENS;  Surgeon: Trenton Peres MD;  Location: [facility] OR;  Service: Ophthalmology;  Laterality: Right;   • CATARACT EXTRACTION W/ INTRAOCULAR LENS IMPLANT Left 4/30/2021    Procedure: REMOVE CATARACT AND IMPLANT  INTRAOCULAR LENS;  Surgeon: Trenton Peres MD;  Location: [facility] OR;  Service: Ophthalmology;  Laterality: Left;   • COLONOSCOPY  08/05/2015   • COLONOSCOPY  07/08/2014    REFUSED BY PATIENT   • COLONOSCOPY  08/05/2015    a diverticulum was found in the sigmoid colon   • ENDOSCOPY  08/05/2015    Esophageal stricture was present. Dilatatin was performed. Esophagitis . Biopsy taken. Normal stomach. Normal duodenum   • ENDOSCOPY N/A 12/6/2018    Procedure: ESOPHAGOGASTRODUODENOSCOPY;  Surgeon: Cory Bliss DO;  Location: [facility] ENDOSCOPY;  Service: Gastroenterology   • UPPER GASTROINTESTINAL ENDOSCOPY  08/05/2015                        PT Assessment/Plan     Row Name 11/07/22 1100          PT Assessment    Functional Limitations Impaired gait;Impaired locomotion;Limitation in home management;Limitations in community activities;Limitations in functional capacity and performance;Performance in leisure activities;Performance in self-care ADL  -EM     Impairments Balance;Gait;Impaired muscle endurance;Muscle strength;Pain;Poor body mechanics  -EM     Assessment Comments Pt demo good understanding of HEP anbd importance of compliance. Pt declines 1 month free [facility] membership.  -EM     Rehab Potential Fair  -EM     Patient/caregiver participated in establishment of treatment plan and goals Yes  -EM     Patient would benefit from skilled therapy intervention Yes  -EM        PT Plan    PT Frequency 1x/week  -EM     Predicted Duration of Therapy Intervention (PT) 2 visits  -EM     PT Plan Comments D/C to Ind HEP.   -EM           User Key  (r) = Recorded By, (t) = Taken By, (c) = Cosigned By    Initials Name Provider Type    EM Jeremy Barrow, PTA Physical  "Therapist Assistant                     OP Exercises     Row Name 11/07/22 1100             Subjective Comments    Subjective Comments Pt reports partial compliance with HEP.  -EM         Subjective Pain    Able to rate subjective pain? yes  -EM      Pre-Treatment Pain Level 4  -EM      Post-Treatment Pain Level 0  -EM         Exercise 1    Exercise Name 1 PRO II-Seat 6-4.0  -EM      Time 1 10'  -EM      Additional Comments ROM  -EM         Exercise 2    Exercise Name 2 SLR 3 way B  -EM      Sets 2 2  -EM      Reps 2 10  -EM         Exercise 3    Exercise Name 3 sit to stand  -EM      Sets 3 2  -EM      Reps 3 10  -EM         Exercise 4    Exercise Name 4 marching in standing  -EM      Sets 4 1  -EM      Reps 4 20  -EM         Exercise 5    Exercise Name 5 HR and TR  -EM      Sets 5 1  -EM      Reps 5 20  -EM         Exercise 6    Exercise Name 6 LAQ  -EM      Sets 6 1  -EM      Reps 6 20  -EM         Exercise 7    Exercise Name 7 tandem stance  -EM      Sets 7 1  -EM      Reps 7 3  -EM      Additional Comments 8\", 12\", 6\"  -EM            User Key  (r) = Recorded By, (t) = Taken By, (c) = Cosigned By    Initials Name Provider Type    Jeremy Phoenix, CECIL Physical Therapist Assistant                                PT OP Goals     Row Name 11/07/22 1100          PT Short Term Goals    STG Date to Achieve 11/08/22  -EM     STG 1 independent and compliant with hep  -EM     STG 1 Progress Met   -EM        Time Calculation    PT Goal Re-Cert Due Date 11/15/22  -EM           User Key  (r) = Recorded By, (t) = Taken By, (c) = Cosigned By    Initials Name Provider Type    Jeremy Phoenix PTA Physical Therapist Assistant                               Time Calculation:   Start Time: 1103  Stop Time: 1145  Time Calculation (min): 42 min  Total Timed Code Minutes- PT: 42 minute(s)  Therapy Charges for Today     Code Description Service Date Service Provider Modifiers Qty    [id number]  PT THER PROC EA 15 MIN 11/7/2022 " Jeremy Barrow, PTA GP, CQ 3                       Jeremy Barrow, CECIL  11/7/2022

## 2022-11-09 ENCOUNTER — LAB (OUTPATIENT)
Dept: LAB | Facility: HOSPITAL | Age: 77
End: 2022-11-09

## 2022-11-09 ENCOUNTER — OFFICE VISIT (OUTPATIENT)
Dept: FAMILY MEDICINE CLINIC | Facility: CLINIC | Age: 77
End: 2022-11-09

## 2022-11-09 VITALS
DIASTOLIC BLOOD PRESSURE: 68 MMHG | WEIGHT: 97.6 LBS | SYSTOLIC BLOOD PRESSURE: 112 MMHG | HEIGHT: 64 IN | BODY MASS INDEX: 16.66 KG/M2

## 2022-11-09 DIAGNOSIS — K90.49 FOOD INTOLERANCE: ICD-10-CM

## 2022-11-09 DIAGNOSIS — M81.0 OSTEOPOROSIS, UNSPECIFIED OSTEOPOROSIS TYPE, UNSPECIFIED PATHOLOGICAL FRACTURE PRESENCE: ICD-10-CM

## 2022-11-09 DIAGNOSIS — R19.7 DIARRHEA, UNSPECIFIED TYPE: Primary | ICD-10-CM

## 2022-11-09 DIAGNOSIS — L20.81 ATOPIC NEURODERMATITIS: ICD-10-CM

## 2022-11-09 LAB
ALBUMIN SERPL-MCNC: 4.1 G/DL (ref 3.5–5.2)
ALBUMIN/GLOB SERPL: 1.2 G/DL
ALP SERPL-CCNC: 50 U/L (ref 39–117)
ALT SERPL W P-5'-P-CCNC: 9 U/L (ref 1–33)
ANION GAP SERPL CALCULATED.3IONS-SCNC: 7 MMOL/L (ref 5–15)
AST SERPL-CCNC: 21 U/L (ref 1–32)
BILIRUB SERPL-MCNC: 0.4 MG/DL (ref 0–1.2)
BUN SERPL-MCNC: 14 MG/DL (ref 8–23)
BUN/CREAT SERPL: 13.7 (ref 7–25)
CALCIUM SPEC-SCNC: 9.4 MG/DL (ref 8.6–10.5)
CHLORIDE SERPL-SCNC: 104 MMOL/L (ref 98–107)
CO2 SERPL-SCNC: 31 MMOL/L (ref 22–29)
CREAT SERPL-MCNC: 1.02 MG/DL (ref 0.57–1)
EGFRCR SERPLBLD CKD-EPI 2021: 56.4 ML/MIN/1.73
GLOBULIN UR ELPH-MCNC: 3.4 GM/DL
GLUCOSE SERPL-MCNC: 76 MG/DL (ref 65–99)
HOLD SPECIMEN: NORMAL
PHOSPHATE SERPL-MCNC: 3.6 MG/DL (ref 2.5–4.5)
POTASSIUM SERPL-SCNC: 4.1 MMOL/L (ref 3.5–5.2)
PROT SERPL-MCNC: 7.5 G/DL (ref 6–8.5)
SODIUM SERPL-SCNC: 142 MMOL/L (ref 136–145)

## 2022-11-09 PROCEDURE — 99214 OFFICE O/P EST MOD 30 MIN: CPT | Performed by: FAMILY MEDICINE

## 2022-11-09 PROCEDURE — 84100 ASSAY OF PHOSPHORUS: CPT

## 2022-11-09 PROCEDURE — 86003 ALLG SPEC IGE CRUDE XTRC EA: CPT | Performed by: FAMILY MEDICINE

## 2022-11-09 PROCEDURE — 86258 DGP ANTIBODY EACH IG CLASS: CPT | Performed by: FAMILY MEDICINE

## 2022-11-09 PROCEDURE — 36415 COLL VENOUS BLD VENIPUNCTURE: CPT | Performed by: FAMILY MEDICINE

## 2022-11-09 PROCEDURE — 80053 COMPREHEN METABOLIC PANEL: CPT

## 2022-11-09 PROCEDURE — 86364 TISS TRNSGLTMNASE EA IG CLAS: CPT | Performed by: FAMILY MEDICINE

## 2022-11-09 RX ORDER — DICYCLOMINE HYDROCHLORIDE 10 MG/1
CAPSULE ORAL
Qty: 60 CAPSULE | Refills: 1 | Status: SHIPPED | OUTPATIENT
Start: 2022-11-09

## 2022-11-09 NOTE — PROGRESS NOTES
Subjective   Mary Anne Wolf is a 77 y.o. female.  Requesting evaluation of intermittent diarrhea states over the past several weeks to months has developed intermittent diarrhea and cramping.  No blood noted.  Not lost any weight in the past 2 months however.  States thinks may be food related.  May foods such as chocolate create more of a problem.  May or may not have a lactose intolerance.  Medicine phoned in last time namely generic Bentyl seems to help a great deal with the cramping and diarrhea.  Has a history of esophageal dysmotility but is not taking a proton pump blocker at present.  Said no major surgeries.  Colonoscopy done in 2015 showed diverticulum on.  Does have some element of atopic dermatitis chronically as well as tanned exam    History of Present Illness   HPI    The following portions of the patient's history were reviewed and updated as appropriate: allergies, current medications, past family history, past medical history, past social history, past surgical history and problem list.    Review of Systems  Review of Systems   Constitutional: Negative for activity change, appetite change, fatigue and unexpected weight change.   HENT: Negative for trouble swallowing and voice change.    Eyes: Negative for redness and visual disturbance.   Respiratory: Negative for cough and wheezing.    Cardiovascular: Negative for chest pain and palpitations.   Gastrointestinal: Positive for abdominal pain and diarrhea. Negative for constipation, nausea and vomiting.   Genitourinary: Negative for urgency.   Musculoskeletal: Negative for joint swelling.   Neurological: Negative for syncope and headaches.   Hematological: Negative for adenopathy.   Psychiatric/Behavioral: Negative for sleep disturbance.       Objective   Physical Exam  Physical Exam  Constitutional:       Appearance: She is well-developed.   HENT:      Head: Normocephalic.   Eyes:      Pupils: Pupils are equal, round, and reactive to light.  "  Neck:      Thyroid: No thyromegaly.   Cardiovascular:      Rate and Rhythm: Normal rate and regular rhythm.      Heart sounds: No murmur heard.    No friction rub. No gallop.   Pulmonary:      Breath sounds: Normal breath sounds.   Abdominal:      General: Abdomen is scaphoid. There is no distension.      Palpations: Abdomen is soft. There is no mass.      Tenderness: There is no abdominal tenderness.      Comments: Positive bowel sounds rebound or guarding or tenderness   Musculoskeletal:         General: Normal range of motion.      Cervical back: Normal range of motion.   Skin:     General: Skin is warm and dry.      Comments: Mild hand eczema.   Neurological:      Mental Status: She is alert and oriented to person, place, and time.      Deep Tendon Reflexes: Reflexes are normal and symmetric.   Psychiatric:         Mood and Affect: Affect is blunt.         Speech: Speech is delayed.         Behavior: Behavior is slowed.           Visit Vitals  /68   Ht 162.6 cm (64\")   Wt 44.3 kg (97 lb 9.6 oz)   LMP  (LMP Unknown)   BMI 16.75 kg/m²     Body mass index is 16.75 kg/m².    /68   Ht 162.6 cm (64\")   Wt 44.3 kg (97 lb 9.6 oz)   LMP  (LMP Unknown)   BMI 16.75 kg/m²   Assessment/Plan   Diagnoses and all orders for this visit:    1. Diarrhea, unspecified type (Primary)  -     Recurrent Gastrointestinal Distress  -     dicyclomine (BENTYL) 10 MG capsule; 3 times daily as needed abdominal cramps  Dispense: 60 capsule; Refill: 1    2. Food intolerance  -     Recurrent Gastrointestinal Distress    3. Atopic neurodermatitis  -     Recurrent Gastrointestinal Distress    Possible food intolerance issue.  Does not sound ischemic given the fact that is only intermittent.  We have counseled using Bentyl as needed counseled on increasing hydration GI distress panel counseled on Rafal of its use we will follow-up after results  "

## 2022-11-10 LAB
GLIADIN PEPTIDE IGA SER-ACNC: 5 UNITS (ref 0–19)
GLIADIN PEPTIDE IGG SER-ACNC: 4 UNITS (ref 0–19)
TTG IGA SER-ACNC: <2 U/ML (ref 0–3)
TTG IGG SER-ACNC: 6 U/ML (ref 0–5)

## 2022-11-11 ENCOUNTER — APPOINTMENT (OUTPATIENT)
Dept: PHYSICAL THERAPY | Facility: HOSPITAL | Age: 77
End: 2022-11-11

## 2022-11-15 ENCOUNTER — APPOINTMENT (OUTPATIENT)
Dept: PHYSICAL THERAPY | Facility: HOSPITAL | Age: 77
End: 2022-11-15

## 2022-11-17 ENCOUNTER — PATIENT ROUNDING (BHMG ONLY) (OUTPATIENT)
Dept: FAMILY MEDICINE CLINIC | Facility: CLINIC | Age: 77
End: 2022-11-17

## 2022-11-17 LAB
CODFISH IGE QN: <0.1 KU/L
CONV CLASS DESCRIPTION: NORMAL
COW MILK IGE QN: <0.1 KU/L
EGG WHITE IGE QN: <0.1 KU/L
GLUTEN IGE QN: <0.1 KU/L
HAZELNUT IGE QN: <0.1 KU/L
PEANUT IGE QN: <0.1 KU/L
SCALLOP IGE QN: <0.1 KU/L
SESAME SEED IGE QN: <0.1 KU/L
SHRIMP IGE QN: <0.1 KU/L
SOYBEAN IGE QN: <0.1 KU/L
WALNUT IGE QN: <0.1 KU/L
WHEAT IGE QN: <0.1 KU/L

## 2022-11-17 NOTE — PROGRESS NOTES
Final lab work reveals possible intolerance to gluten mild.  May wish to try a decreased gluten diet.  Let us know if wishes to proceed and we will make arrangements to visit with a nutritionist

## 2022-11-17 NOTE — PROGRESS NOTES
While speaking with  about her results today.I ask if she was pleased with  and staff or if there was anything we could do to make her visits better. said she ws Very pleased with Tung Crowley and staff.

## 2022-11-18 ENCOUNTER — APPOINTMENT (OUTPATIENT)
Dept: PHYSICAL THERAPY | Facility: HOSPITAL | Age: 77
End: 2022-11-18

## 2022-11-28 DIAGNOSIS — I10 ESSENTIAL HYPERTENSION: Chronic | ICD-10-CM

## 2022-11-28 RX ORDER — AMLODIPINE BESYLATE 2.5 MG/1
TABLET ORAL
Qty: 90 TABLET | Refills: 0 | Status: SHIPPED | OUTPATIENT
Start: 2022-11-28 | End: 2023-02-06

## 2022-12-19 RX ORDER — DIVALPROEX SODIUM 250 MG/1
TABLET, EXTENDED RELEASE ORAL
Qty: 60 TABLET | Refills: 0 | Status: SHIPPED | OUTPATIENT
Start: 2022-12-19 | End: 2023-01-31

## 2023-01-05 ENCOUNTER — APPOINTMENT (OUTPATIENT)
Dept: LAB | Facility: HOSPITAL | Age: 78
End: 2023-01-05

## 2023-01-12 ENCOUNTER — APPOINTMENT (OUTPATIENT)
Dept: ONCOLOGY | Facility: HOSPITAL | Age: 78
End: 2023-01-12

## 2023-01-27 ENCOUNTER — TELEPHONE (OUTPATIENT)
Dept: FAMILY MEDICINE CLINIC | Facility: CLINIC | Age: 78
End: 2023-01-27
Payer: MEDICARE

## 2023-01-31 RX ORDER — DIVALPROEX SODIUM 250 MG/1
TABLET, EXTENDED RELEASE ORAL
Qty: 60 TABLET | Refills: 0 | Status: SHIPPED | OUTPATIENT
Start: 2023-01-31 | End: 2023-03-01

## 2023-02-06 DIAGNOSIS — I10 ESSENTIAL HYPERTENSION: Chronic | ICD-10-CM

## 2023-02-06 RX ORDER — AMLODIPINE BESYLATE 2.5 MG/1
TABLET ORAL
Qty: 90 TABLET | Refills: 0 | Status: SHIPPED | OUTPATIENT
Start: 2023-02-06

## 2023-02-19 ENCOUNTER — APPOINTMENT (OUTPATIENT)
Dept: GENERAL RADIOLOGY | Facility: HOSPITAL | Age: 78
End: 2023-02-19
Payer: MEDICARE

## 2023-02-19 ENCOUNTER — HOSPITAL ENCOUNTER (EMERGENCY)
Facility: HOSPITAL | Age: 78
Discharge: HOME | End: 2023-02-19
Attending: FAMILY MEDICINE | Admitting: FAMILY MEDICINE
Payer: MEDICARE

## 2023-02-19 VITALS
WEIGHT: 97.7 LBS | HEIGHT: 65 IN | OXYGEN SATURATION: 94 % | BODY MASS INDEX: 16.28 KG/M2 | DIASTOLIC BLOOD PRESSURE: 65 MMHG | SYSTOLIC BLOOD PRESSURE: 143 MMHG | HEART RATE: 70 BPM | RESPIRATION RATE: 16 BRPM | TEMPERATURE: 98.4 F

## 2023-02-19 DIAGNOSIS — J18.9 PNEUMONIA OF RIGHT UPPER LOBE DUE TO INFECTIOUS ORGANISM: Primary | ICD-10-CM

## 2023-02-19 LAB
ALBUMIN SERPL-MCNC: 3.4 G/DL (ref 3.5–5.2)
ALBUMIN/GLOB SERPL: 0.9 G/DL
ALP SERPL-CCNC: 67 U/L (ref 39–117)
ALT SERPL W P-5'-P-CCNC: 8 U/L (ref 1–33)
ANION GAP SERPL CALCULATED.3IONS-SCNC: 8 MMOL/L (ref 5–15)
AST SERPL-CCNC: 12 U/L (ref 1–32)
BILIRUB SERPL-MCNC: 0.4 MG/DL (ref 0–1.2)
BILIRUB UR QL STRIP: NEGATIVE
BUN SERPL-MCNC: 7 MG/DL (ref 8–23)
BUN/CREAT SERPL: 9.3 (ref 7–25)
CALCIUM SPEC-SCNC: 9.7 MG/DL (ref 8.6–10.5)
CHLORIDE SERPL-SCNC: 98 MMOL/L (ref 98–107)
CLARITY UR: CLEAR
CO2 SERPL-SCNC: 29 MMOL/L (ref 22–29)
COLOR UR: YELLOW
CREAT SERPL-MCNC: 0.75 MG/DL (ref 0.57–1)
DEPRECATED RDW RBC AUTO: 42.5 FL (ref 37–54)
EGFRCR SERPLBLD CKD-EPI 2021: 81.6 ML/MIN/1.73
ERYTHROCYTE [DISTWIDTH] IN BLOOD BY AUTOMATED COUNT: 14.1 % (ref 12.3–15.4)
FLUAV RNA RESP QL NAA+PROBE: NOT DETECTED
FLUBV RNA RESP QL NAA+PROBE: NOT DETECTED
GLOBULIN UR ELPH-MCNC: 4 GM/DL
GLUCOSE SERPL-MCNC: 70 MG/DL (ref 65–99)
GLUCOSE UR STRIP-MCNC: NEGATIVE MG/DL
HCT VFR BLD AUTO: 38.6 % (ref 34–46.6)
HGB BLD-MCNC: 12.9 G/DL (ref 12–15.9)
HGB UR QL STRIP.AUTO: NEGATIVE
KETONES UR QL STRIP: NEGATIVE
LEUKOCYTE ESTERASE UR QL STRIP.AUTO: NEGATIVE
LYMPHOCYTES # BLD MANUAL: 0.82 10*3/MM3 (ref 0.7–3.1)
LYMPHOCYTES NFR BLD MANUAL: 15 % (ref 5–12)
MCH RBC QN AUTO: 27.9 PG (ref 26.6–33)
MCHC RBC AUTO-ENTMCNC: 33.4 G/DL (ref 31.5–35.7)
MCV RBC AUTO: 83.4 FL (ref 79–97)
MONOCYTES # BLD: 0.95 10*3/MM3 (ref 0.1–0.9)
NEUTROPHILS # BLD AUTO: 4.55 10*3/MM3 (ref 1.7–7)
NEUTROPHILS NFR BLD MANUAL: 72 % (ref 42.7–76)
NITRITE UR QL STRIP: NEGATIVE
NT-PROBNP SERPL-MCNC: 965.7 PG/ML (ref 0–1800)
PH UR STRIP.AUTO: 7.5 [PH] (ref 5–9)
PLAT MORPH BLD: NORMAL
PLATELET # BLD AUTO: 162 10*3/MM3 (ref 140–450)
PMV BLD AUTO: 12.4 FL (ref 6–12)
POTASSIUM SERPL-SCNC: 4 MMOL/L (ref 3.5–5.2)
PROT SERPL-MCNC: 7.4 G/DL (ref 6–8.5)
PROT UR QL STRIP: ABNORMAL
RBC # BLD AUTO: 4.63 10*6/MM3 (ref 3.77–5.28)
SARS-COV-2 RNA RESP QL NAA+PROBE: NOT DETECTED
SODIUM SERPL-SCNC: 135 MMOL/L (ref 136–145)
SP GR UR STRIP: 1.01 (ref 1–1.03)
TARGETS BLD QL SMEAR: ABNORMAL
TROPONIN T SERPL HS-MCNC: 47 NG/L
TROPONIN T SERPL HS-MCNC: 49 NG/L
UROBILINOGEN UR QL STRIP: ABNORMAL
VALPROATE SERPL-MCNC: 74.1 MCG/ML (ref 50–125)
VARIANT LYMPHS NFR BLD MANUAL: 13 % (ref 19.6–45.3)
WBC MORPH BLD: NORMAL
WBC NRBC COR # BLD: 6.32 10*3/MM3 (ref 3.4–10.8)

## 2023-02-19 PROCEDURE — 81003 URINALYSIS AUTO W/O SCOPE: CPT

## 2023-02-19 PROCEDURE — 99283 EMERGENCY DEPT VISIT LOW MDM: CPT

## 2023-02-19 PROCEDURE — 93005 ELECTROCARDIOGRAM TRACING: CPT

## 2023-02-19 PROCEDURE — 87636 SARSCOV2 & INF A&B AMP PRB: CPT

## 2023-02-19 PROCEDURE — 93010 ELECTROCARDIOGRAM REPORT: CPT | Performed by: INTERNAL MEDICINE

## 2023-02-19 PROCEDURE — 36415 COLL VENOUS BLD VENIPUNCTURE: CPT

## 2023-02-19 PROCEDURE — 84484 ASSAY OF TROPONIN QUANT: CPT

## 2023-02-19 PROCEDURE — 85007 BL SMEAR W/DIFF WBC COUNT: CPT

## 2023-02-19 PROCEDURE — 71045 X-RAY EXAM CHEST 1 VIEW: CPT

## 2023-02-19 PROCEDURE — 85025 COMPLETE CBC W/AUTO DIFF WBC: CPT

## 2023-02-19 PROCEDURE — 80053 COMPREHEN METABOLIC PANEL: CPT

## 2023-02-19 PROCEDURE — 83880 ASSAY OF NATRIURETIC PEPTIDE: CPT

## 2023-02-19 PROCEDURE — 80164 ASSAY DIPROPYLACETIC ACD TOT: CPT

## 2023-02-19 RX ORDER — SODIUM CHLORIDE 0.9 % (FLUSH) 0.9 %
10 SYRINGE (ML) INJECTION AS NEEDED
Status: DISCONTINUED | OUTPATIENT
Start: 2023-02-19 | End: 2023-02-19 | Stop reason: HOSPADM

## 2023-02-19 RX ORDER — DOXYCYCLINE 100 MG/1
100 CAPSULE ORAL 2 TIMES DAILY
Qty: 10 CAPSULE | Refills: 0 | Status: SHIPPED | OUTPATIENT
Start: 2023-02-19 | End: 2023-02-23 | Stop reason: SDUPTHER

## 2023-02-19 RX ADMIN — Medication 10 ML: at 10:49

## 2023-02-19 NOTE — ED PROVIDER NOTES
Subjective   History of Present Illness  77 year old female patient presents to ER for complaint of congestion and cough for past 4 days with shortness of breath and lower back discomfort today. She denies known fever or sore throat. No known ill contacts. She has been taking tylenol, cold and flu medicine, and cough medication OTC without relief. She smokes a quarter pack of cigarettes a day, denies ETOH or illicit drug use.         Review of Systems   Constitutional: Negative.  Negative for fever.   HENT: Positive for congestion.    Eyes: Negative.    Respiratory: Positive for cough.    Cardiovascular: Negative.    Gastrointestinal: Negative.  Negative for abdominal pain, constipation, diarrhea, nausea and vomiting.   Endocrine: Negative.    Genitourinary: Negative.  Negative for decreased urine volume and dysuria.   Musculoskeletal: Positive for back pain and myalgias.   Skin: Negative.    Allergic/Immunologic: Negative.    Neurological: Positive for headaches.   Hematological: Negative.    Psychiatric/Behavioral: Negative.        Past Medical History:   Diagnosis Date   • Acute posthemorrhagic anemia    • Arthritis    • Backache     LLE radiculopathy   • Cigarette smoker    • Discoid lupus erythematosus    • Diverticular disease of colon     incomplete colonoscopy to Hepatic flexure.  Rec ACBE, pt. considering   • Dizziness and giddiness    • Dysphagia    • Esophagitis     grade III   • Gastroesophageal reflux disease     rec pt try RX prilosec   • H/O screening mammography    • Localization-related (focal) (partial) symptomatic epilepsy and epileptic syndromes with complex partial seizures, intractable, without status epilepticus (HCC)    • Neuropathy    • Neuropathy    • Nicotine dependence    • Osteopenia    • Periumbilical pain    • Seizure disorder (HCC)    • Seizure disorder (HCC)     history of complex partial seizure   • Stricture of esophagus    • Systemic lupus erythematosus (HCC)    • Vertigo    •  Vertigo        Allergies   Allergen Reactions   • Milk-Related Compounds GI Intolerance       Past Surgical History:   Procedure Laterality Date   • CATARACT EXTRACTION W/ INTRAOCULAR LENS IMPLANT Right 4/23/2021    Procedure: REMOVE CATARACT AND IMPLANT INTRAOCULAR LENS;  Surgeon: Trenton Peres MD;  Location: [facility] OR;  Service: Ophthalmology;  Laterality: Right;   • CATARACT EXTRACTION W/ INTRAOCULAR LENS IMPLANT Left 4/30/2021    Procedure: REMOVE CATARACT AND IMPLANT  INTRAOCULAR LENS;  Surgeon: Trenton Peres MD;  Location: [facility] OR;  Service: Ophthalmology;  Laterality: Left;   • COLONOSCOPY  08/05/2015   • COLONOSCOPY  07/08/2014    REFUSED BY PATIENT   • COLONOSCOPY  08/05/2015    a diverticulum was found in the sigmoid colon   • ENDOSCOPY  08/05/2015    Esophageal stricture was present. Dilatatin was performed. Esophagitis . Biopsy taken. Normal stomach. Normal duodenum   • ENDOSCOPY N/A 12/6/2018    Procedure: ESOPHAGOGASTRODUODENOSCOPY;  Surgeon: Cory Bliss DO;  Location: [facility] ENDOSCOPY;  Service: Gastroenterology   • UPPER GASTROINTESTINAL ENDOSCOPY  08/05/2015       Family History   Problem Relation Age of Onset   • Lupus Mother    • Rheum arthritis Mother    • Lupus Sister        Social History     Socioeconomic History   • Marital status:    Tobacco Use   • Smoking status: Every Day     Packs/day: 0.25     Types: Cigarettes   • Smokeless tobacco: Never   Substance and Sexual Activity   • Alcohol use: No   • Drug use: No   • Sexual activity: Defer           Objective   Physical Exam  Vitals and nursing note reviewed.   Constitutional:       General: She is not in acute distress.     Appearance: Normal appearance. She is not ill-appearing, toxic-appearing or diaphoretic.   HENT:      Head: Normocephalic.      Right Ear: External ear normal.      Left Ear: External ear normal.      Nose: Nose normal.      Mouth/Throat:      Mouth: Mucous membranes are moist.   Eyes:       Pupils: Pupils are equal, round, and reactive to light.   Cardiovascular:      Rate and Rhythm: Normal rate and regular rhythm.      Pulses: Normal pulses.      Heart sounds: Normal heart sounds.   Pulmonary:      Effort: Pulmonary effort is normal. No respiratory distress.      Breath sounds: No stridor. Decreased breath sounds present. No wheezing or rhonchi.   Abdominal:      General: Bowel sounds are normal. There is no distension.      Palpations: Abdomen is soft.      Tenderness: There is no abdominal tenderness.   Musculoskeletal:         General: Normal range of motion.      Cervical back: Normal range of motion.   Skin:     General: Skin is warm and dry.      Capillary Refill: Capillary refill takes less than 2 seconds.   Neurological:      Mental Status: She is alert and oriented to person, place, and time.   Psychiatric:         Mood and Affect: Mood normal.         Behavior: Behavior normal.         Thought Content: Thought content normal.         Judgment: Judgment normal.         ECG 12 Lead      Date/Time: 2/19/2023 12:12 PM  Performed by: Sangeeta Mojica APRN  Authorized by: Kilo Coles MD   Interpreted by physician  Comparison: compared with previous ECG   Rhythm: sinus rhythm  BPM: 75  Clinical impression: abnormal ECG                 ED Course  ED Course as of 02/19/23 1424   Sun Feb 19, 2023   1418 Spoke with patient about her results and plan for discharge on antibiotics with close follow up with her PCP. He verbalizes understanding and is agreeable with plan. [HS]      ED Course User Index  [HS] Sangeeta Mojica APRN            Results for orders placed or performed during the hospital encounter of 02/19/23   COVID-19 and FLU A/B PCR - Swab, Nasopharynx    Specimen: Nasopharynx; Swab   Result Value Ref Range    COVID19 Not Detected Not Detected - Ref. Range    Influenza A PCR Not Detected Not Detected    Influenza B PCR Not Detected Not Detected   Comprehensive Metabolic  Panel    Specimen: Blood   Result Value Ref Range    Glucose 70 65 - 99 mg/dL    BUN 7 (L) 8 - 23 mg/dL    Creatinine 0.75 0.57 - 1.00 mg/dL    Sodium 135 (L) 136 - 145 mmol/L    Potassium 4.0 3.5 - 5.2 mmol/L    Chloride 98 98 - 107 mmol/L    CO2 29.0 22.0 - 29.0 mmol/L    Calcium 9.7 8.6 - 10.5 mg/dL    Total Protein 7.4 6.0 - 8.5 g/dL    Albumin 3.4 (L) 3.5 - 5.2 g/dL    ALT (SGPT) 8 1 - 33 U/L    AST (SGOT) 12 1 - 32 U/L    Alkaline Phosphatase 67 39 - 117 U/L    Total Bilirubin 0.4 0.0 - 1.2 mg/dL    Globulin 4.0 gm/dL    A/G Ratio 0.9 g/dL    BUN/Creatinine Ratio 9.3 7.0 - 25.0    Anion Gap 8.0 5.0 - 15.0 mmol/L    eGFR 81.6 >60.0 mL/min/1.73   Valproic Acid Level, Total    Specimen: Blood   Result Value Ref Range    Valproic Acid 74.1 50.0 - 125.0 mcg/mL   BNP    Specimen: Blood   Result Value Ref Range    proBNP 965.7 0.0 - 1,800.0 pg/mL   Single High Sensitivity Troponin T    Specimen: Blood   Result Value Ref Range    HS Troponin T 49 (H) <10 ng/L   Urinalysis With Microscopic If Indicated (No Culture) - Urine, Clean Catch    Specimen: Urine, Clean Catch   Result Value Ref Range    Color, UA Yellow Yellow, Straw, Dark Yellow, Suzanna    Appearance, UA Clear Clear    pH, UA 7.5 5.0 - 9.0    Specific [place], UA 1.014 1.003 - 1.030    Glucose, UA Negative Negative    Ketones, UA Negative Negative    Bilirubin, UA Negative Negative    Blood, UA Negative Negative    Protein, UA Trace (A) Negative    Leuk Esterase, UA Negative Negative    Nitrite, UA Negative Negative    Urobilinogen, UA 4.0 E.U./dL (A) 0.2 - 1.0 E.U./dL   CBC Auto Differential    Specimen: Blood   Result Value Ref Range    WBC 6.32 3.40 - 10.80 10*3/mm3    RBC 4.63 3.77 - 5.28 10*6/mm3    Hemoglobin 12.9 12.0 - 15.9 g/dL    Hematocrit 38.6 34.0 - 46.6 %    MCV 83.4 79.0 - 97.0 fL    MCH 27.9 26.6 - 33.0 pg    MCHC 33.4 31.5 - 35.7 g/dL    RDW 14.1 12.3 - 15.4 %    RDW-SD 42.5 37.0 - 54.0 fl    MPV 12.4 (H) 6.0 - 12.0 fL    Platelets 162 140 -  450 10*3/mm3   Manual Differential    Specimen: Blood   Result Value Ref Range    Neutrophil % 72.0 42.7 - 76.0 %    Lymphocyte % 13.0 (L) 19.6 - 45.3 %    Monocyte % 15.0 (H) 5.0 - 12.0 %    Neutrophils Absolute 4.55 1.70 - 7.00 10*3/mm3    Lymphocytes Absolute 0.82 0.70 - 3.10 10*3/mm3    Monocytes Absolute 0.95 (H) 0.10 - 0.90 10*3/mm3    Target Cells Slight/1+ None Seen    WBC Morphology Normal Normal    Platelet Morphology Normal Normal   Single High Sensitivity Troponin T    Specimen: Blood   Result Value Ref Range    HS Troponin T 47 (H) <10 ng/L   ECG 12 Lead Dyspnea   Result Value Ref Range    QT Interval 398 ms    QTC Interval 444 ms           XR Chest 1 View    Result Date: 2/19/2023  EXAM: Chest, 1 AP view CLINICAL INDICATION: Shortness of breath/congestion COMPARISON: Chest radiographs December 2021 FINDINGS: There is a new patchy right upper lobe infiltrate. There is no pleural effusion, pneumothorax or cardiomegaly. There is no acute or aggressive osseous lesion seen.     CONCLUSION: Findings concerning for developing right upper lobe pneumonia. Continued interval follow-up is advised. Electronically signed by:  Toni Avila DO  2/19/2023 10:42 AM [facility] Workstation: [id number]                                 [facility]    Final diagnoses:   Pneumonia of right upper lobe due to infectious organism       ED Disposition  ED Disposition     ED Disposition   Discharge    Condition   Stable    Comment   --             Errol Ruby MD  [address] [place] [address] [address]IVE  [address]  [place] [id number] [phone]      ER follow up in 2-3 days for reevaluation         Medication List      New Prescriptions    doxycycline 100 MG capsule  Commonly known as: MONODOX  Take 1 capsule by mouth 2 (Two) Times a Day for 5 days.           Where to Get Your Medications      These medications were sent to [facility] [place], [place] - [address] - [phone]  - [phone] Health system [address] [facility] [address]     Phone: [phone]   · doxycycline 100 MG capsule          Galina, Sangeeta OVERTON, APRN  02/19/23 [id number]

## 2023-02-19 NOTE — DISCHARGE INSTRUCTIONS
Home to rest. Drink plenty of fluids. Continue your home medications including your inhalers. Take antibiotics as prescribed. Follow up with your primary care provider in 2-3 days for reevaluation of your symptoms. Return for worsening symptoms.

## 2023-02-23 ENCOUNTER — OFFICE VISIT (OUTPATIENT)
Dept: FAMILY MEDICINE CLINIC | Facility: CLINIC | Age: 78
End: 2023-02-23
Payer: MEDICARE

## 2023-02-23 VITALS
SYSTOLIC BLOOD PRESSURE: 124 MMHG | OXYGEN SATURATION: 98 % | DIASTOLIC BLOOD PRESSURE: 68 MMHG | HEIGHT: 65 IN | WEIGHT: 95.1 LBS | BODY MASS INDEX: 15.85 KG/M2 | HEART RATE: 88 BPM

## 2023-02-23 DIAGNOSIS — D84.9 IMMUNOCOMPROMISED: Chronic | ICD-10-CM

## 2023-02-23 DIAGNOSIS — Z78.9 MEDICALLY COMPLEX PATIENT: Chronic | ICD-10-CM

## 2023-02-23 DIAGNOSIS — J18.9 PNEUMONIA OF RIGHT LUNG DUE TO INFECTIOUS ORGANISM, UNSPECIFIED PART OF LUNG: Primary | ICD-10-CM

## 2023-02-23 PROCEDURE — 99214 OFFICE O/P EST MOD 30 MIN: CPT | Performed by: FAMILY MEDICINE

## 2023-02-23 RX ORDER — DOXYCYCLINE 100 MG/1
CAPSULE ORAL
Qty: 10 CAPSULE | Refills: 0 | Status: SHIPPED | OUTPATIENT
Start: 2023-02-23

## 2023-02-23 NOTE — PROGRESS NOTES
Subjective   Mary Anne Wolf is a 77 y.o. female.  Urgency room follow-up.  Was seen for malaise fatigue and not feeling well low-grade cough.  Found to have a right middle upper lobe pneumonia as documented per x-ray.  This is confirmed by report.  Was placed on 5 days of doxycycline.  Needs a full 10 days.  Had a similar episode about 2 years ago although x-ray at that time was questionable.  Continues to be immunocompromise due to her lupus and treatment of same.  Since being at home does not have much of an appetite mild headache probably related to the doxycycline but breathing better not coughing as much.  Does have a nebulizer has been using it as needed.  Vital signs are noted.    History of Present Illness   HPI    The following portions of the patient's history were reviewed and updated as appropriate: allergies, current medications, past family history, past medical history, past social history, past surgical history and problem list.    Review of Systems  Review of Systems   Constitutional: Positive for appetite change. Negative for activity change, fatigue and unexpected weight change.   HENT: Negative for trouble swallowing and voice change.    Eyes: Negative for redness and visual disturbance.   Respiratory: Positive for cough. Negative for wheezing.    Cardiovascular: Negative for chest pain and palpitations.   Gastrointestinal: Negative for abdominal pain, constipation, diarrhea, nausea and vomiting.   Genitourinary: Negative for urgency.   Musculoskeletal: Negative for joint swelling.   Neurological: Positive for headaches. Negative for syncope.   Hematological: Negative for adenopathy.   Psychiatric/Behavioral: Negative for sleep disturbance.       Objective   Physical Exam  Physical Exam  Constitutional:       Appearance: She is well-developed.   HENT:      Head: Normocephalic.      Right Ear: Tympanic membrane, ear canal and external ear normal.      Left Ear: Tympanic membrane, ear canal and  "external ear normal.      Nose: Nose normal.   Eyes:      Pupils: Pupils are equal, round, and reactive to light.   Neck:      Thyroid: No thyromegaly.   Cardiovascular:      Rate and Rhythm: Normal rate and regular rhythm.      Heart sounds: Normal heart sounds. No murmur heard.    No friction rub. No gallop.   Pulmonary:      Effort: Pulmonary effort is normal.      Breath sounds: Normal breath sounds.      Comments: Scant rhonchi mid fields not reproducible.  Not dyspneic at rest.  Abdominal:      General: There is no distension.      Palpations: Abdomen is soft. There is no mass.      Tenderness: There is no abdominal tenderness.   Musculoskeletal:         General: Normal range of motion.      Cervical back: Normal range of motion.   Skin:     General: Skin is warm and dry.   Neurological:      Mental Status: She is alert and oriented to person, place, and time.      Deep Tendon Reflexes: Reflexes are normal and symmetric.   Psychiatric:         Mood and Affect: Affect is blunt.         Speech: Speech is delayed.         Behavior: Behavior is cooperative.           Visit Vitals  /68   Pulse 88   Ht 165.1 cm (65\")   Wt 43.1 kg (95 lb 1.6 oz)   LMP  (LMP Unknown)   SpO2 98% Comment: room air   BMI 15.83 kg/m²     Body mass index is 15.83 kg/m².  /68   Pulse 88   Ht 165.1 cm (65\")   Wt 43.1 kg (95 lb 1.6 oz)   LMP  (LMP Unknown)   SpO2 98% Comment: room air  BMI 15.83 kg/m²       Assessment/Plan   Diagnoses and all orders for this visit:    1. Pneumonia of right lung due to infectious organism, unspecified part of lung (Primary)  -     Pulse Oximetry, Spot  -     doxycycline (MONODOX) 100 MG capsule; 1 po bid x 5 days with food for total of 10 days  Dispense: 10 capsule; Refill: 0    2. Immunocompromised (HCC)    3. Medically complex patient    Full 10 days of medication counseled on not being afraid use nebulizer counseled on greatly increasing fluid intake.  Counseled on preventative techniques. "  Has a regular follow-up in approximately 2 to 3 weeks.  We will repeat chest x-ray at that time

## 2023-02-26 LAB
QT INTERVAL: 398 MS
QTC INTERVAL: 444 MS

## 2023-03-01 RX ORDER — DIVALPROEX SODIUM 250 MG/1
TABLET, EXTENDED RELEASE ORAL
Qty: 60 TABLET | Refills: 0 | Status: SHIPPED | OUTPATIENT
Start: 2023-03-01 | End: 2023-03-30

## 2023-03-17 ENCOUNTER — OFFICE VISIT (OUTPATIENT)
Dept: FAMILY MEDICINE CLINIC | Facility: CLINIC | Age: 78
End: 2023-03-17
Payer: MEDICARE

## 2023-03-17 VITALS
DIASTOLIC BLOOD PRESSURE: 68 MMHG | BODY MASS INDEX: 16.16 KG/M2 | WEIGHT: 97 LBS | SYSTOLIC BLOOD PRESSURE: 122 MMHG | HEIGHT: 65 IN

## 2023-03-17 DIAGNOSIS — J18.9 PNEUMONIA OF RIGHT UPPER LOBE DUE TO INFECTIOUS ORGANISM: ICD-10-CM

## 2023-03-17 DIAGNOSIS — M32.9 SYSTEMIC LUPUS ERYTHEMATOSUS, UNSPECIFIED SLE TYPE, UNSPECIFIED ORGAN INVOLVEMENT STATUS: Chronic | ICD-10-CM

## 2023-03-17 DIAGNOSIS — M25.562 LEFT KNEE PAIN, UNSPECIFIED CHRONICITY: Primary | ICD-10-CM

## 2023-03-17 DIAGNOSIS — Z00.00 MEDICARE ANNUAL WELLNESS VISIT, SUBSEQUENT: ICD-10-CM

## 2023-03-17 PROCEDURE — 99214 OFFICE O/P EST MOD 30 MIN: CPT | Performed by: FAMILY MEDICINE

## 2023-03-17 PROCEDURE — 1170F FXNL STATUS ASSESSED: CPT | Performed by: FAMILY MEDICINE

## 2023-03-17 PROCEDURE — G0439 PPPS, SUBSEQ VISIT: HCPCS | Performed by: FAMILY MEDICINE

## 2023-03-17 PROCEDURE — 3074F SYST BP LT 130 MM HG: CPT | Performed by: FAMILY MEDICINE

## 2023-03-17 PROCEDURE — 3078F DIAST BP <80 MM HG: CPT | Performed by: FAMILY MEDICINE

## 2023-03-17 NOTE — PROGRESS NOTES
The ABCs of the Annual Wellness Visit  Subsequent Medicare Wellness Visit    Subjective      Mary Anne Wolf is a 77 y.o. female who presents for a Subsequent Medicare Wellness Visit.    The following portions of the patient's history were reviewed and   updated as appropriate: allergies, current medications, past family history, past medical history, past social history, past surgical history and problem list.    Compared to one year ago, the patient feels her physical   health is worse.    Compared to one year ago, the patient feels her mental   health is the same.    Recent Hospitalizations:  She was not admitted to the hospital during the last year.       Current Medical Providers:  Patient Care Team:  Errol Ruby MD as PCP - General (Family Medicine)    Outpatient Medications Prior to Visit   Medication Sig Dispense Refill   • albuterol sulfate  (90 Base) MCG/ACT inhaler Inhale 2 puffs Every 6 (Six) Hours As Needed for Wheezing. 18 g 5   • amLODIPine (NORVASC) 2.5 MG tablet Take 1 tablet by mouth once daily 90 tablet 0   • cetirizine (zyrTEC) 10 MG tablet Take 1 tablet by mouth Daily. 30 tablet 11   • clobetasol (Temovate) 0.05 % cream Apply to hand twice daily for 10 days and as needed 30 g 5   • dicyclomine (BENTYL) 10 MG capsule 3 times daily as needed abdominal cramps 60 capsule 1   • divalproex (DEPAKOTE ER) 250 MG 24 hr tablet TAKE 2 TABLETS BY MOUTH ONCE DAILY FOR HEADACHE 60 tablet 0   • doxycycline (MONODOX) 100 MG capsule 1 po bid x 5 days with food for total of 10 days 10 capsule 0   • fluticasone (FLONASE) 50 MCG/ACT nasal spray 2 sprays into the nostril(s) as directed by provider Daily As Needed for Rhinitis.     • gabapentin (NEURONTIN) 800 MG tablet 1 twice daily for legs new dosing 180 tablet 2   • ipratropium-albuterol (DUO-NEB) 0.5-2.5 mg/3 ml nebulizer Take 3 mL by nebulization Every 4 (Four) Hours As Needed for Wheezing. 360 mL 3   • lansoprazole (PREVACID) 30 MG capsule Take 1  capsule by mouth Daily.     • meclizine (ANTIVERT) 25 MG tablet Take 1 tablet by mouth 3 (Three) Times a Day As Needed for Dizziness. 30 tablet 5   • meloxicam (MOBIC) 15 MG tablet 1 PO Daily with food. 30 tablet 3   • VITAMIN D PO Take 500 Units by mouth Daily.       No facility-administered medications prior to visit.       No opioid medication identified on active medication list. I have reviewed chart for other potential  high risk medication/s and harmful drug interactions in the elderly.          Aspirin is not on active medication list.  Aspirin use is not indicated based on review of current medical condition/s. Risk of harm outweighs potential benefits.  .    Patient Active Problem List   Diagnosis   • Essential hypertension   • Neuropathy   • Vertigo   • Systemic lupus erythematosus (HCC)   • Borderline glaucoma   • Nuclear cataract   • Long-term use of hydroxychloroquine   • Osteoporosis, unspecified   • Tobacco use   • Gastroesophageal reflux disease   • Dysphagia   • Esophageal dysmotilities   • Migraine syndrome   • Immunocompromised (HCC)   • Medically complex patient     Subjective   Mary Anne Wolf is a 77 y.o. female.  Reevaluation right upper lobe pneumonia Lupus medically complex patient.  In the interim respiratory symptoms have abated.  Has developed ongoing left knee pain related to DJD and lupus.  Is already seen orthopedics.  We have counseled on conservative therapy including the use of a rollator at all times now instead of a cane.  States is probably not a candidate for replacement.  We have counseled on therapy for same.  Also counseled maintaining hydration infection prevention is in the past.  Chart reviewed.    History of Present Illness   HPI    The following portions of the patient's history were reviewed and updated as appropriate: allergies, current medications, past family history, past medical history, past social history, past surgical history and problem list.    Review of  Systems  Review of Systems   Constitutional: Negative for activity change, appetite change, fatigue and unexpected weight change.   HENT: Negative for trouble swallowing and voice change.    Eyes: Negative for redness and visual disturbance.   Respiratory: Negative for cough and wheezing.    Cardiovascular: Negative for chest pain and palpitations.   Gastrointestinal: Negative for abdominal pain, constipation, diarrhea, nausea and vomiting.   Genitourinary: Negative for urgency.   Musculoskeletal: Positive for arthralgias and gait problem. Negative for joint swelling.   Neurological: Negative for syncope and headaches.   Hematological: Negative for adenopathy.   Psychiatric/Behavioral: Negative for sleep disturbance.       Objective   Physical Exam  Physical Exam  Constitutional:       Appearance: She is well-developed.   HENT:      Head: Normocephalic.      Right Ear: External ear normal.   Eyes:      Pupils: Pupils are equal, round, and reactive to light.   Neck:      Thyroid: No thyromegaly.   Cardiovascular:      Rate and Rhythm: Normal rate and regular rhythm.      Heart sounds: Normal heart sounds. No murmur heard.    No friction rub. No gallop.   Pulmonary:      Breath sounds: Normal breath sounds.      Comments: Minimally increased expiratory phase normal rate  Abdominal:      General: There is no distension.      Palpations: Abdomen is soft. There is no mass.      Tenderness: There is no abdominal tenderness.   Musculoskeletal:         General: Normal range of motion.      Cervical back: Normal range of motion.      Comments: Osteoarthritic hypertrophy changes both knees.  2+ pulses.  Walks with a cane.   Skin:     General: Skin is warm and dry.   Neurological:      Mental Status: She is alert and oriented to person, place, and time.      Deep Tendon Reflexes: Reflexes are normal and symmetric.   Psychiatric:         Mood and Affect: Affect is blunt.         Speech: Speech is delayed.         Behavior:  "Behavior is slowed.           Visit Vitals  /68   Ht 165.1 cm (65\")   Wt 44 kg (97 lb)   LMP  (LMP Unknown)   BMI 16.14 kg/m²     Body mass index is 16.14 kg/m².    /68   Ht 165.1 cm (65\")   Wt 44 kg (97 lb)   LMP  (LMP Unknown)   BMI 16.14 kg/m²         Repeat chest x-ray today.  Counseling on the above counseled to use rollator at all times.  Continue all current medications.  Is up-to-date on all other.  Recheck 6 months  Assessment/Plan      Advance Care Planning  Advance Directive is not on file.  ACP discussion was held with the patient during this visit. Patient does not have an advance directive, information provided.     Objective    Vitals:    03/17/23 0853   BP: 122/68   Weight: 44 kg (97 lb)   Height: 165.1 cm (65\")   PainSc: 0-No pain     Estimated body mass index is 16.14 kg/m² as calculated from the following:    Height as of this encounter: 165.1 cm (65\").    Weight as of this encounter: 44 kg (97 lb).    BMI is below normal parameters (malnutrition). Recommendations: none (medical contraindication)      Does the patient have evidence of cognitive impairment?   No            HEALTH RISK ASSESSMENT    Smoking Status:  Social History     Tobacco Use   Smoking Status Every Day   • Packs/day: 0.25   • Types: Cigarettes   Smokeless Tobacco Never     Alcohol Consumption:  Social History     Substance and Sexual Activity   Alcohol Use No     Fall Risk Screen:    STEADI Fall Risk Assessment was completed, and patient is at HIGH risk for falls. Assessment completed on:3/17/2023    Depression Screening:  PHQ-2/PHQ-9 Depression Screening 3/17/2023   Little Interest or Pleasure in Doing Things 0-->not at all   Feeling Down, Depressed or Hopeless 0-->not at all   PHQ-9: Brief Depression Severity Measure Score 0       Health Habits and Functional and Cognitive Screening:  Functional & Cognitive Status 3/17/2023   Do you have difficulty preparing food and eating? No   Do you have difficulty bathing " yourself, getting dressed or grooming yourself? No   Do you have difficulty using the toilet? No   Do you have difficulty moving around from place to place? Yes   Do you have trouble with steps or getting out of a bed or a chair? No   Current Diet Other   Dental Exam Not up to date   Eye Exam Up to date        Eye Exam Comment -   Exercise (times per week) Other        Exercise Frequency Comment -   Current Exercises Include Other   Current Exercise Activities Include -   Do you need help using the phone?  No   Are you deaf or do you have serious difficulty hearing?  No   Do you need help with transportation? No   Do you need help shopping? Yes   Do you need help preparing meals?  No   Do you need help with housework?  No   Do you need help with laundry? No   Do you need help taking your medications? No   Do you need help managing money? No   Do you ever drive or ride in a car without wearing a seat belt? No   Have you felt unusual stress, anger or loneliness in the last month? No   Who do you live with? Spouse   If you need help, do you have trouble finding someone available to you? No   Have you been bothered in the last four weeks by sexual problems? No   Do you have difficulty concentrating, remembering or making decisions? No       Age-appropriate Screening Schedule:  Refer to the list below for future screening recommendations based on patient's age, sex and/or medical conditions. Orders for these recommended tests are listed in the plan section. The patient has been provided with a written plan.    Health Maintenance   Topic Date Due   • ANNUAL WELLNESS VISIT  03/15/2023   • MAMMOGRAM  09/30/2024   • TDAP/TD VACCINES (2 - Td or Tdap) 06/29/2027   • COLORECTAL CANCER SCREENING  06/29/2027   • HEPATITIS C SCREENING  Completed   • COVID-19 Vaccine  Completed   • INFLUENZA VACCINE  Completed   • Pneumococcal Vaccine 65+  Completed   • DXA SCAN  Discontinued   • ZOSTER VACCINE  Discontinued                CMS  Preventative Services Quick Reference  Risk Factors Identified During Encounter:    Fall Risk-High or Moderate: Discussed Fall Prevention in the home  Immunizations Discussed/Encouraged: Hepatitis B Vaccine/Series and COVID19    The above risks/problems have been discussed with the patient.  Pertinent information has been shared with the patient in the After Visit Summary.    Diagnoses and all orders for this visit:    1. Pneumonia of right upper lobe due to infectious organism (Primary)  -     XR Chest 2 View    2. Medicare annual wellness visit, subsequent    3. Left knee pain, unspecified chronicity    4. Systemic lupus erythematosus, unspecified SLE type, unspecified organ involvement status (HCC)        Follow Up:   Next Medicare Wellness visit to be scheduled in 1 year.      An After Visit Summary and PPPS were made available to the patient.

## 2023-03-21 DIAGNOSIS — M32.9 SYSTEMIC LUPUS ERYTHEMATOSUS, UNSPECIFIED SLE TYPE, UNSPECIFIED ORGAN INVOLVEMENT STATUS: Chronic | ICD-10-CM

## 2023-03-21 RX ORDER — GABAPENTIN 800 MG/1
TABLET ORAL
Qty: 180 TABLET | Refills: 2 | Status: SHIPPED | OUTPATIENT
Start: 2023-03-21

## 2023-03-30 DIAGNOSIS — R42 VERTIGO: ICD-10-CM

## 2023-03-30 RX ORDER — DIVALPROEX SODIUM 250 MG/1
TABLET, EXTENDED RELEASE ORAL
Qty: 60 TABLET | Refills: 0 | Status: SHIPPED | OUTPATIENT
Start: 2023-03-30

## 2023-03-30 RX ORDER — MECLIZINE HYDROCHLORIDE 25 MG/1
TABLET ORAL
Qty: 30 TABLET | Refills: 0 | Status: SHIPPED | OUTPATIENT
Start: 2023-03-30

## 2023-05-01 RX ORDER — DIVALPROEX SODIUM 250 MG/1
TABLET, EXTENDED RELEASE ORAL
Qty: 60 TABLET | Refills: 0 | Status: SHIPPED | OUTPATIENT
Start: 2023-05-01

## 2023-05-08 DIAGNOSIS — I10 ESSENTIAL HYPERTENSION: Chronic | ICD-10-CM

## 2023-05-08 RX ORDER — AMLODIPINE BESYLATE 2.5 MG/1
TABLET ORAL
Qty: 90 TABLET | Refills: 0 | Status: SHIPPED | OUTPATIENT
Start: 2023-05-08

## 2023-05-26 RX ORDER — DIVALPROEX SODIUM 250 MG/1
TABLET, EXTENDED RELEASE ORAL
Qty: 60 TABLET | Refills: 0 | Status: SHIPPED | OUTPATIENT
Start: 2023-05-26

## 2023-05-27 DIAGNOSIS — I10 ESSENTIAL HYPERTENSION: Chronic | ICD-10-CM

## 2023-05-30 RX ORDER — AMLODIPINE BESYLATE 2.5 MG/1
TABLET ORAL
Qty: 90 TABLET | Refills: 0 | Status: SHIPPED | OUTPATIENT
Start: 2023-05-30

## 2023-07-31 RX ORDER — DIVALPROEX SODIUM 250 MG/1
TABLET, EXTENDED RELEASE ORAL
Qty: 60 TABLET | Refills: 0 | Status: SHIPPED | OUTPATIENT
Start: 2023-07-31

## 2023-08-28 ENCOUNTER — HOSPITAL ENCOUNTER (OUTPATIENT)
Facility: HOSPITAL | Age: 78
Setting detail: OBSERVATION
Discharge: HOME | End: 2023-08-31
Attending: FAMILY MEDICINE
Payer: MEDICARE

## 2023-08-28 ENCOUNTER — APPOINTMENT (OUTPATIENT)
Dept: GENERAL RADIOLOGY | Facility: HOSPITAL | Age: 78
End: 2023-08-28
Payer: MEDICARE

## 2023-08-28 DIAGNOSIS — R07.2 PRECORDIAL PAIN: Primary | ICD-10-CM

## 2023-08-28 DIAGNOSIS — I21.4 NSTEMI, INITIAL EPISODE OF CARE: ICD-10-CM

## 2023-08-28 DIAGNOSIS — Z74.09 IMPAIRED FUNCTIONAL MOBILITY, BALANCE, GAIT, AND ENDURANCE: ICD-10-CM

## 2023-08-28 LAB
ALBUMIN SERPL-MCNC: 4.2 G/DL (ref 3.5–5.2)
ALBUMIN/GLOB SERPL: 1.1 G/DL
ALP SERPL-CCNC: 66 U/L (ref 39–117)
ALT SERPL W P-5'-P-CCNC: 9 U/L (ref 1–33)
ANION GAP SERPL CALCULATED.3IONS-SCNC: 14 MMOL/L (ref 5–15)
AST SERPL-CCNC: 23 U/L (ref 1–32)
BASOPHILS # BLD AUTO: 0.03 10*3/MM3 (ref 0–0.2)
BASOPHILS NFR BLD AUTO: 0.6 % (ref 0–1.5)
BILIRUB SERPL-MCNC: 0.2 MG/DL (ref 0–1.2)
BUN SERPL-MCNC: 17 MG/DL (ref 8–23)
BUN/CREAT SERPL: 14 (ref 7–25)
CALCIUM SPEC-SCNC: 10.2 MG/DL (ref 8.6–10.5)
CHLORIDE SERPL-SCNC: 97 MMOL/L (ref 98–107)
CK SERPL-CCNC: 114 U/L (ref 20–180)
CO2 SERPL-SCNC: 29 MMOL/L (ref 22–29)
CREAT SERPL-MCNC: 1.21 MG/DL (ref 0.57–1)
DEPRECATED RDW RBC AUTO: 44.4 FL (ref 37–54)
EGFRCR SERPLBLD CKD-EPI 2021: 45.7 ML/MIN/1.73
EOSINOPHIL # BLD AUTO: 0.08 10*3/MM3 (ref 0–0.4)
EOSINOPHIL NFR BLD AUTO: 1.6 % (ref 0.3–6.2)
ERYTHROCYTE [DISTWIDTH] IN BLOOD BY AUTOMATED COUNT: 14.3 % (ref 12.3–15.4)
GLOBULIN UR ELPH-MCNC: 3.8 GM/DL
GLUCOSE SERPL-MCNC: 105 MG/DL (ref 65–99)
HCT VFR BLD AUTO: 37.7 % (ref 34–46.6)
HGB BLD-MCNC: 12.2 G/DL (ref 12–15.9)
HOLD SPECIMEN: NORMAL
HOLD SPECIMEN: NORMAL
IMM GRANULOCYTES # BLD AUTO: 0.01 10*3/MM3 (ref 0–0.05)
IMM GRANULOCYTES NFR BLD AUTO: 0.2 % (ref 0–0.5)
LIPASE SERPL-CCNC: 47 U/L (ref 13–60)
LYMPHOCYTES # BLD AUTO: 0.68 10*3/MM3 (ref 0.7–3.1)
LYMPHOCYTES NFR BLD AUTO: 13.2 % (ref 19.6–45.3)
MAGNESIUM SERPL-MCNC: 2.1 MG/DL (ref 1.6–2.4)
MCH RBC QN AUTO: 27.7 PG (ref 26.6–33)
MCHC RBC AUTO-ENTMCNC: 32.4 G/DL (ref 31.5–35.7)
MCV RBC AUTO: 85.7 FL (ref 79–97)
MONOCYTES # BLD AUTO: 0.61 10*3/MM3 (ref 0.1–0.9)
MONOCYTES NFR BLD AUTO: 11.8 % (ref 5–12)
NEUTROPHILS NFR BLD AUTO: 3.74 10*3/MM3 (ref 1.7–7)
NEUTROPHILS NFR BLD AUTO: 72.6 % (ref 42.7–76)
NRBC BLD AUTO-RTO: 0 /100 WBC (ref 0–0.2)
NT-PROBNP SERPL-MCNC: 455.2 PG/ML (ref 0–1800)
PLATELET # BLD AUTO: 170 10*3/MM3 (ref 140–450)
PMV BLD AUTO: 13 FL (ref 6–12)
POTASSIUM SERPL-SCNC: 3.9 MMOL/L (ref 3.5–5.2)
PROT SERPL-MCNC: 8 G/DL (ref 6–8.5)
QT INTERVAL: 374 MS
QTC INTERVAL: 441 MS
RBC # BLD AUTO: 4.4 10*6/MM3 (ref 3.77–5.28)
SODIUM SERPL-SCNC: 140 MMOL/L (ref 136–145)
TROPONIN T SERPL HS-MCNC: 59 NG/L
WBC NRBC COR # BLD: 5.15 10*3/MM3 (ref 3.4–10.8)
WHOLE BLOOD HOLD COAG: NORMAL
WHOLE BLOOD HOLD SPECIMEN: NORMAL

## 2023-08-28 PROCEDURE — 71045 X-RAY EXAM CHEST 1 VIEW: CPT

## 2023-08-28 PROCEDURE — 93005 ELECTROCARDIOGRAM TRACING: CPT

## 2023-08-28 PROCEDURE — 82550 ASSAY OF CK (CPK): CPT | Performed by: FAMILY MEDICINE

## 2023-08-28 PROCEDURE — 36415 COLL VENOUS BLD VENIPUNCTURE: CPT

## 2023-08-28 PROCEDURE — 83735 ASSAY OF MAGNESIUM: CPT | Performed by: FAMILY MEDICINE

## 2023-08-28 PROCEDURE — 80053 COMPREHEN METABOLIC PANEL: CPT | Performed by: FAMILY MEDICINE

## 2023-08-28 PROCEDURE — 85025 COMPLETE CBC W/AUTO DIFF WBC: CPT | Performed by: FAMILY MEDICINE

## 2023-08-28 PROCEDURE — 83690 ASSAY OF LIPASE: CPT | Performed by: FAMILY MEDICINE

## 2023-08-28 PROCEDURE — 93005 ELECTROCARDIOGRAM TRACING: CPT | Performed by: FAMILY MEDICINE

## 2023-08-28 PROCEDURE — 84484 ASSAY OF TROPONIN QUANT: CPT | Performed by: FAMILY MEDICINE

## 2023-08-28 PROCEDURE — 83880 ASSAY OF NATRIURETIC PEPTIDE: CPT | Performed by: FAMILY MEDICINE

## 2023-08-28 PROCEDURE — 99284 EMERGENCY DEPT VISIT MOD MDM: CPT

## 2023-08-28 RX ORDER — HYDRALAZINE HYDROCHLORIDE 20 MG/ML
10 INJECTION INTRAMUSCULAR; INTRAVENOUS ONCE
Status: DISCONTINUED | OUTPATIENT
Start: 2023-08-28 | End: 2023-08-28

## 2023-08-28 RX ORDER — ASPIRIN 81 MG/1
324 TABLET, CHEWABLE ORAL ONCE
Status: COMPLETED | OUTPATIENT
Start: 2023-08-28 | End: 2023-08-28

## 2023-08-28 RX ORDER — ALUMINA, MAGNESIA, AND SIMETHICONE 2400; 2400; 240 MG/30ML; MG/30ML; MG/30ML
15 SUSPENSION ORAL ONCE
Status: COMPLETED | OUTPATIENT
Start: 2023-08-28 | End: 2023-08-28

## 2023-08-28 RX ORDER — LIDOCAINE HYDROCHLORIDE 20 MG/ML
15 SOLUTION OROPHARYNGEAL ONCE
Status: COMPLETED | OUTPATIENT
Start: 2023-08-28 | End: 2023-08-28

## 2023-08-28 RX ORDER — SODIUM CHLORIDE 0.9 % (FLUSH) 0.9 %
10 SYRINGE (ML) INJECTION AS NEEDED
Status: DISCONTINUED | OUTPATIENT
Start: 2023-08-28 | End: 2023-08-31 | Stop reason: HOSPADM

## 2023-08-28 RX ADMIN — LIDOCAINE HYDROCHLORIDE 15 ML: 20 SOLUTION ORAL; TOPICAL at 23:23

## 2023-08-28 RX ADMIN — ALUMINUM HYDROXIDE, MAGNESIUM HYDROXIDE, AND DIMETHICONE 15 ML: 400; 400; 40 SUSPENSION ORAL at 23:21

## 2023-08-28 RX ADMIN — ASPIRIN 324 MG: 81 TABLET, CHEWABLE ORAL at 23:20

## 2023-08-28 NOTE — Clinical Note
Catheter Pulled back from LV to AO. Measurement captured. Patient transported by 3 ICU RNs on zoll defibrillator, portable monitor, and NC.  Vasopressin currently infusing.  Patient connected to [facility] room 3080 monitor.  BP noted to be low and was restarted on phenylephrine gtt per order to maintain MAP > 65.  Dr. Jolly with [facility] called and made aware of patient's arrival to unit and of critical lab values (charted in EPIC flowhseet).  MD aware of RN concerns for need of arterial line with increased vasopressor requirements, CCM to come to bedside to assess.  Patient is now in ST, mentation improved, AAOx4.  Family and friends at bedside provided update and all questions and concerns addressed.

## 2023-08-28 NOTE — Clinical Note
Hemostasis started on the right radial artery. R-Band was used in achieving hemostasis. Radial compression device applied to vessel. Hemostasis achieved successfully. Closure device additional comment: 10 cc air in TR band

## 2023-08-29 ENCOUNTER — APPOINTMENT (OUTPATIENT)
Dept: CARDIOLOGY | Facility: HOSPITAL | Age: 78
End: 2023-08-29
Payer: MEDICARE

## 2023-08-29 ENCOUNTER — APPOINTMENT (OUTPATIENT)
Dept: NUCLEAR MEDICINE | Facility: HOSPITAL | Age: 78
End: 2023-08-29
Payer: MEDICARE

## 2023-08-29 PROBLEM — R07.9 CHEST PAIN: Status: ACTIVE | Noted: 2023-08-29

## 2023-08-29 PROBLEM — R63.6 UNDERWEIGHT: Status: ACTIVE | Noted: 2023-08-29

## 2023-08-29 PROBLEM — D64.9 NORMOCYTIC ANEMIA: Status: ACTIVE | Noted: 2023-08-29

## 2023-08-29 PROBLEM — N18.2 CKD (CHRONIC KIDNEY DISEASE) STAGE 2, GFR 60-89 ML/MIN: Status: ACTIVE | Noted: 2023-08-29

## 2023-08-29 LAB
ANION GAP SERPL CALCULATED.3IONS-SCNC: 9 MMOL/L (ref 5–15)
BH CV ECHO MEAS - ACS: 1.9 CM
BH CV ECHO MEAS - AO MAX PG: 5.5 MMHG
BH CV ECHO MEAS - AO MEAN PG: 3 MMHG
BH CV ECHO MEAS - AO ROOT DIAM: 2.9 CM
BH CV ECHO MEAS - AO V2 MAX: 117 CM/SEC
BH CV ECHO MEAS - AO V2 VTI: 24.3 CM
BH CV ECHO MEAS - AVA(I,D): 2.16 CM2
BH CV ECHO MEAS - EDV(CUBED): 53.8 ML
BH CV ECHO MEAS - EDV(MOD-SP2): 53.9 ML
BH CV ECHO MEAS - EDV(MOD-SP4): 56.3 ML
BH CV ECHO MEAS - EF(MOD-BP): 71.1 %
BH CV ECHO MEAS - EF(MOD-SP2): 73.3 %
BH CV ECHO MEAS - EF(MOD-SP4): 68.4 %
BH CV ECHO MEAS - ESV(CUBED): 8.2 ML
BH CV ECHO MEAS - ESV(MOD-SP2): 14.4 ML
BH CV ECHO MEAS - ESV(MOD-SP4): 17.8 ML
BH CV ECHO MEAS - FS: 46.6 %
BH CV ECHO MEAS - IVS/LVPW: 0.9 CM
BH CV ECHO MEAS - IVSD: 0.87 CM
BH CV ECHO MEAS - LA DIMENSION: 3.3 CM
BH CV ECHO MEAS - LAT PEAK E' VEL: 10.9 CM/SEC
BH CV ECHO MEAS - LV DIASTOLIC VOL/BSA (35-75): 39.9 CM2
BH CV ECHO MEAS - LV MASS(C)D: 103.1 GRAMS
BH CV ECHO MEAS - LV MAX PG: 3.4 MMHG
BH CV ECHO MEAS - LV MEAN PG: 2 MMHG
BH CV ECHO MEAS - LV SYSTOLIC VOL/BSA (12-30): 12.6 CM2
BH CV ECHO MEAS - LV V1 MAX: 92.5 CM/SEC
BH CV ECHO MEAS - LV V1 VTI: 16.5 CM
BH CV ECHO MEAS - LVIDD: 3.8 CM
BH CV ECHO MEAS - LVIDS: 2.02 CM
BH CV ECHO MEAS - LVOT AREA: 3.2 CM2
BH CV ECHO MEAS - LVOT DIAM: 2.01 CM
BH CV ECHO MEAS - LVPWD: 0.97 CM
BH CV ECHO MEAS - MED PEAK E' VEL: 8.1 CM/SEC
BH CV ECHO MEAS - MR MAX PG: 102 MMHG
BH CV ECHO MEAS - MR MAX VEL: 504.9 CM/SEC
BH CV ECHO MEAS - MV A MAX VEL: 107 CM/SEC
BH CV ECHO MEAS - MV DEC SLOPE: 264.8 CM/SEC2
BH CV ECHO MEAS - MV DEC TIME: 0.26 MSEC
BH CV ECHO MEAS - MV E MAX VEL: 63 CM/SEC
BH CV ECHO MEAS - MV E/A: 0.59
BH CV ECHO MEAS - MV MAX PG: 4.2 MMHG
BH CV ECHO MEAS - MV MEAN PG: 1.26 MMHG
BH CV ECHO MEAS - MV P1/2T: 70.7 MSEC
BH CV ECHO MEAS - MV V2 VTI: 23.8 CM
BH CV ECHO MEAS - MVA(P1/2T): 3.1 CM2
BH CV ECHO MEAS - MVA(VTI): 2.2 CM2
BH CV ECHO MEAS - PA V2 MAX: 86.9 CM/SEC
BH CV ECHO MEAS - RAP SYSTOLE: 3 MMHG
BH CV ECHO MEAS - RVDD: 2.42 CM
BH CV ECHO MEAS - RVSP: 37.7 MMHG
BH CV ECHO MEAS - SI(MOD-SP2): 28 ML/M2
BH CV ECHO MEAS - SI(MOD-SP4): 27.3 ML/M2
BH CV ECHO MEAS - SV(LVOT): 52.4 ML
BH CV ECHO MEAS - SV(MOD-SP2): 39.5 ML
BH CV ECHO MEAS - SV(MOD-SP4): 38.5 ML
BH CV ECHO MEAS - TAPSE (>1.6): 1.99 CM
BH CV ECHO MEAS - TR MAX PG: 34.7 MMHG
BH CV ECHO MEAS - TR MAX VEL: 294.5 CM/SEC
BH CV ECHO MEASUREMENTS AVERAGE E/E' RATIO: 6.63
BH CV REST NUCLEAR ISOTOPE DOSE: 11 MCI
BH CV STRESS COMMENTS STAGE 1: NORMAL
BH CV STRESS DOSE REGADENOSON STAGE 1: 0.4
BH CV STRESS DURATION MIN STAGE 1: 0
BH CV STRESS DURATION SEC STAGE 1: 10
BH CV STRESS NUCLEAR ISOTOPE DOSE: 33.1 MCI
BH CV STRESS PROTOCOL 1: NORMAL
BH CV STRESS RECOVERY BP: NORMAL MMHG
BH CV STRESS RECOVERY HR: 85 BPM
BH CV STRESS RECOVERY O2: 96 %
BH CV STRESS STAGE 1: 1
BUN SERPL-MCNC: 18 MG/DL (ref 8–23)
BUN/CREAT SERPL: 18.9 (ref 7–25)
CALCIUM SPEC-SCNC: 9.3 MG/DL (ref 8.6–10.5)
CHLORIDE SERPL-SCNC: 97 MMOL/L (ref 98–107)
CO2 SERPL-SCNC: 31 MMOL/L (ref 22–29)
CREAT SERPL-MCNC: 0.95 MG/DL (ref 0.57–1)
DEPRECATED RDW RBC AUTO: 41.9 FL (ref 37–54)
EGFRCR SERPLBLD CKD-EPI 2021: 61.1 ML/MIN/1.73
ERYTHROCYTE [DISTWIDTH] IN BLOOD BY AUTOMATED COUNT: 13.6 % (ref 12.3–15.4)
FOLATE SERPL-MCNC: 11.3 NG/ML (ref 4.78–24.2)
GEN 5 2HR TROPONIN T REFLEX: 52 NG/L
GLUCOSE SERPL-MCNC: 85 MG/DL (ref 65–99)
HCT VFR BLD AUTO: 30.1 % (ref 34–46.6)
HGB BLD-MCNC: 10.4 G/DL (ref 12–15.9)
HOLD SPECIMEN: NORMAL
IRON 24H UR-MRATE: 30 MCG/DL (ref 37–145)
IRON SATN MFR SERPL: 11 % (ref 20–50)
LEFT ATRIUM VOLUME INDEX: 22.6 ML/M2
LV EF NUC BP: 65 %
MAXIMAL PREDICTED HEART RATE: 141 BPM
MCH RBC QN AUTO: 29.1 PG (ref 26.6–33)
MCHC RBC AUTO-ENTMCNC: 34.6 G/DL (ref 31.5–35.7)
MCV RBC AUTO: 84.3 FL (ref 79–97)
PERCENT MAX PREDICTED HR: 63.83 %
PLATELET # BLD AUTO: 134 10*3/MM3 (ref 140–450)
PMV BLD AUTO: 12.6 FL (ref 6–12)
POTASSIUM SERPL-SCNC: 3.9 MMOL/L (ref 3.5–5.2)
QT INTERVAL: 416 MS
QTC INTERVAL: 439 MS
RBC # BLD AUTO: 3.57 10*6/MM3 (ref 3.77–5.28)
SODIUM SERPL-SCNC: 137 MMOL/L (ref 136–145)
STRESS BASELINE BP: NORMAL MMHG
STRESS BASELINE HR: 73 BPM
STRESS O2 SAT REST: 95 %
STRESS PERCENT HR: 75 %
STRESS POST EXERCISE DUR SEC: 25 SEC
STRESS POST O2 SAT PEAK: 96 %
STRESS POST PEAK BP: NORMAL MMHG
STRESS POST PEAK HR: 90 BPM
STRESS TARGET HR: 120 BPM
TIBC SERPL-MCNC: 279 MCG/DL (ref 298–536)
TRANSFERRIN SERPL-MCNC: 187 MG/DL (ref 200–360)
TROPONIN T DELTA: -7 NG/L
TROPONIN T SERPL HS-MCNC: 49 NG/L
VIT B12 BLD-MCNC: 198 PG/ML (ref 211–946)
WBC NRBC COR # BLD: 4.42 10*3/MM3 (ref 3.4–10.8)

## 2023-08-29 PROCEDURE — 93005 ELECTROCARDIOGRAM TRACING: CPT

## 2023-08-29 PROCEDURE — 93306 TTE W/DOPPLER COMPLETE: CPT | Performed by: INTERNAL MEDICINE

## 2023-08-29 PROCEDURE — G0378 HOSPITAL OBSERVATION PER HR: HCPCS

## 2023-08-29 PROCEDURE — 84484 ASSAY OF TROPONIN QUANT: CPT

## 2023-08-29 PROCEDURE — 36415 COLL VENOUS BLD VENIPUNCTURE: CPT

## 2023-08-29 PROCEDURE — 84484 ASSAY OF TROPONIN QUANT: CPT | Performed by: FAMILY MEDICINE

## 2023-08-29 PROCEDURE — 99204 OFFICE O/P NEW MOD 45 MIN: CPT | Performed by: INTERNAL MEDICINE

## 2023-08-29 PROCEDURE — A9500 TC99M SESTAMIBI: HCPCS

## 2023-08-29 PROCEDURE — 96372 THER/PROPH/DIAG INJ SC/IM: CPT

## 2023-08-29 PROCEDURE — 84466 ASSAY OF TRANSFERRIN: CPT

## 2023-08-29 PROCEDURE — 83540 ASSAY OF IRON: CPT

## 2023-08-29 PROCEDURE — 93306 TTE W/DOPPLER COMPLETE: CPT

## 2023-08-29 PROCEDURE — 82607 VITAMIN B-12: CPT

## 2023-08-29 PROCEDURE — 25010000002 ONDANSETRON PER 1 MG

## 2023-08-29 PROCEDURE — 78452 HT MUSCLE IMAGE SPECT MULT: CPT

## 2023-08-29 PROCEDURE — 93017 CV STRESS TEST TRACING ONLY: CPT

## 2023-08-29 PROCEDURE — 25010000002 REGADENOSON 0.4 MG/5ML SOLUTION: Performed by: NURSE PRACTITIONER

## 2023-08-29 PROCEDURE — 78452 HT MUSCLE IMAGE SPECT MULT: CPT | Performed by: INTERNAL MEDICINE

## 2023-08-29 PROCEDURE — 82746 ASSAY OF FOLIC ACID SERUM: CPT

## 2023-08-29 PROCEDURE — 96374 THER/PROPH/DIAG INJ IV PUSH: CPT

## 2023-08-29 PROCEDURE — 93018 CV STRESS TEST I&R ONLY: CPT | Performed by: INTERNAL MEDICINE

## 2023-08-29 PROCEDURE — 0 TECHNETIUM SESTAMIBI

## 2023-08-29 PROCEDURE — 93016 CV STRESS TEST SUPVJ ONLY: CPT | Performed by: INTERNAL MEDICINE

## 2023-08-29 PROCEDURE — 25010000002 ENOXAPARIN PER 10 MG

## 2023-08-29 PROCEDURE — 85027 COMPLETE CBC AUTOMATED: CPT

## 2023-08-29 PROCEDURE — 80048 BASIC METABOLIC PNL TOTAL CA: CPT

## 2023-08-29 RX ORDER — ACETAMINOPHEN 160 MG/5ML
650 SOLUTION ORAL EVERY 4 HOURS PRN
Status: DISCONTINUED | OUTPATIENT
Start: 2023-08-29 | End: 2023-08-31 | Stop reason: HOSPADM

## 2023-08-29 RX ORDER — IPRATROPIUM BROMIDE AND ALBUTEROL SULFATE 2.5; .5 MG/3ML; MG/3ML
3 SOLUTION RESPIRATORY (INHALATION) EVERY 4 HOURS PRN
Status: DISCONTINUED | OUTPATIENT
Start: 2023-08-29 | End: 2023-08-31 | Stop reason: HOSPADM

## 2023-08-29 RX ORDER — HYDROXYCHLOROQUINE SULFATE 200 MG/1
200 TABLET, FILM COATED ORAL DAILY
Status: DISCONTINUED | OUTPATIENT
Start: 2023-08-29 | End: 2023-08-31 | Stop reason: HOSPADM

## 2023-08-29 RX ORDER — MELATONIN
500 DAILY
Status: DISCONTINUED | OUTPATIENT
Start: 2023-08-29 | End: 2023-08-31 | Stop reason: HOSPADM

## 2023-08-29 RX ORDER — BACLOFEN 10 MG/1
10 TABLET ORAL 2 TIMES DAILY PRN
COMMUNITY

## 2023-08-29 RX ORDER — REGADENOSON 0.08 MG/ML
0.4 INJECTION, SOLUTION INTRAVENOUS ONCE
Status: COMPLETED | OUTPATIENT
Start: 2023-08-29 | End: 2023-08-29

## 2023-08-29 RX ORDER — ACETAMINOPHEN 325 MG/1
650 TABLET ORAL EVERY 4 HOURS PRN
Status: DISCONTINUED | OUTPATIENT
Start: 2023-08-29 | End: 2023-08-30 | Stop reason: SDUPTHER

## 2023-08-29 RX ORDER — BISACODYL 5 MG/1
5 TABLET, DELAYED RELEASE ORAL DAILY PRN
Status: DISCONTINUED | OUTPATIENT
Start: 2023-08-29 | End: 2023-08-31 | Stop reason: HOSPADM

## 2023-08-29 RX ORDER — SODIUM CHLORIDE 9 MG/ML
10 INJECTION INTRAVENOUS EVERY 12 HOURS SCHEDULED
Status: DISCONTINUED | OUTPATIENT
Start: 2023-08-29 | End: 2023-08-31 | Stop reason: HOSPADM

## 2023-08-29 RX ORDER — BISACODYL 10 MG
10 SUPPOSITORY, RECTAL RECTAL DAILY PRN
Status: DISCONTINUED | OUTPATIENT
Start: 2023-08-29 | End: 2023-08-31 | Stop reason: HOSPADM

## 2023-08-29 RX ORDER — ACETAMINOPHEN 650 MG/1
650 SUPPOSITORY RECTAL EVERY 4 HOURS PRN
Status: DISCONTINUED | OUTPATIENT
Start: 2023-08-29 | End: 2023-08-31 | Stop reason: HOSPADM

## 2023-08-29 RX ORDER — AMLODIPINE BESYLATE 2.5 MG/1
2.5 TABLET ORAL DAILY
Status: DISCONTINUED | OUTPATIENT
Start: 2023-08-29 | End: 2023-08-31 | Stop reason: HOSPADM

## 2023-08-29 RX ORDER — DIVALPROEX SODIUM 500 MG/1
500 TABLET, EXTENDED RELEASE ORAL DAILY
Status: DISCONTINUED | OUTPATIENT
Start: 2023-08-29 | End: 2023-08-31 | Stop reason: HOSPADM

## 2023-08-29 RX ORDER — SODIUM CHLORIDE 0.9 % (FLUSH) 0.9 %
10 SYRINGE (ML) INJECTION EVERY 12 HOURS SCHEDULED
Status: DISCONTINUED | OUTPATIENT
Start: 2023-08-29 | End: 2023-08-31 | Stop reason: HOSPADM

## 2023-08-29 RX ORDER — HYDROXYCHLOROQUINE SULFATE 200 MG/1
200 TABLET, FILM COATED ORAL DAILY
COMMUNITY

## 2023-08-29 RX ORDER — LORAZEPAM 2 MG/ML
0.5 INJECTION INTRAMUSCULAR EVERY 6 HOURS PRN
Status: DISCONTINUED | OUTPATIENT
Start: 2023-08-29 | End: 2023-08-31 | Stop reason: HOSPADM

## 2023-08-29 RX ORDER — SODIUM CHLORIDE 9 MG/ML
40 INJECTION, SOLUTION INTRAVENOUS AS NEEDED
Status: DISCONTINUED | OUTPATIENT
Start: 2023-08-29 | End: 2023-08-31 | Stop reason: HOSPADM

## 2023-08-29 RX ORDER — ENOXAPARIN SODIUM 100 MG/ML
40 INJECTION SUBCUTANEOUS DAILY
Status: DISCONTINUED | OUTPATIENT
Start: 2023-08-29 | End: 2023-08-30

## 2023-08-29 RX ORDER — ASPIRIN 81 MG/1
81 TABLET ORAL DAILY
Status: DISCONTINUED | OUTPATIENT
Start: 2023-08-29 | End: 2023-08-31 | Stop reason: HOSPADM

## 2023-08-29 RX ORDER — PANTOPRAZOLE SODIUM 40 MG/1
40 TABLET, DELAYED RELEASE ORAL
Status: DISCONTINUED | OUTPATIENT
Start: 2023-08-29 | End: 2023-08-31 | Stop reason: HOSPADM

## 2023-08-29 RX ORDER — GABAPENTIN 400 MG/1
800 CAPSULE ORAL DAILY
Status: DISCONTINUED | OUTPATIENT
Start: 2023-08-29 | End: 2023-08-30

## 2023-08-29 RX ORDER — CETIRIZINE HYDROCHLORIDE 10 MG/1
10 TABLET ORAL DAILY
Status: DISCONTINUED | OUTPATIENT
Start: 2023-08-29 | End: 2023-08-31 | Stop reason: HOSPADM

## 2023-08-29 RX ORDER — BACLOFEN 10 MG/1
10 TABLET ORAL 2 TIMES DAILY PRN
Status: DISCONTINUED | OUTPATIENT
Start: 2023-08-29 | End: 2023-08-31 | Stop reason: HOSPADM

## 2023-08-29 RX ORDER — AMOXICILLIN 250 MG
2 CAPSULE ORAL 2 TIMES DAILY
Status: DISCONTINUED | OUTPATIENT
Start: 2023-08-29 | End: 2023-08-31 | Stop reason: HOSPADM

## 2023-08-29 RX ORDER — MECLIZINE HYDROCHLORIDE 25 MG/1
25 TABLET ORAL 3 TIMES DAILY PRN
Status: DISCONTINUED | OUTPATIENT
Start: 2023-08-29 | End: 2023-08-31 | Stop reason: HOSPADM

## 2023-08-29 RX ORDER — ONDANSETRON 4 MG/1
4 TABLET, FILM COATED ORAL EVERY 6 HOURS PRN
Status: DISCONTINUED | OUTPATIENT
Start: 2023-08-29 | End: 2023-08-31 | Stop reason: HOSPADM

## 2023-08-29 RX ORDER — SODIUM CHLORIDE 0.9 % (FLUSH) 0.9 %
10 SYRINGE (ML) INJECTION AS NEEDED
Status: DISCONTINUED | OUTPATIENT
Start: 2023-08-29 | End: 2023-08-31 | Stop reason: HOSPADM

## 2023-08-29 RX ORDER — POLYETHYLENE GLYCOL 3350 17 G/17G
17 POWDER, FOR SOLUTION ORAL DAILY PRN
Status: DISCONTINUED | OUTPATIENT
Start: 2023-08-29 | End: 2023-08-31 | Stop reason: HOSPADM

## 2023-08-29 RX ORDER — NITROGLYCERIN 0.4 MG/1
0.4 TABLET SUBLINGUAL
Status: DISCONTINUED | OUTPATIENT
Start: 2023-08-29 | End: 2023-08-31 | Stop reason: HOSPADM

## 2023-08-29 RX ORDER — DICYCLOMINE HYDROCHLORIDE 10 MG/1
10 CAPSULE ORAL 3 TIMES DAILY PRN
Status: DISCONTINUED | OUTPATIENT
Start: 2023-08-29 | End: 2023-08-31 | Stop reason: HOSPADM

## 2023-08-29 RX ORDER — ONDANSETRON 2 MG/ML
4 INJECTION INTRAMUSCULAR; INTRAVENOUS EVERY 6 HOURS PRN
Status: DISCONTINUED | OUTPATIENT
Start: 2023-08-29 | End: 2023-08-31 | Stop reason: HOSPADM

## 2023-08-29 RX ADMIN — ENOXAPARIN SODIUM 40 MG: 40 INJECTION SUBCUTANEOUS at 08:53

## 2023-08-29 RX ADMIN — DOCUSATE SODIUM 50 MG AND SENNOSIDES 8.6 MG 2 TABLET: 8.6; 5 TABLET, FILM COATED ORAL at 08:55

## 2023-08-29 RX ADMIN — TECHNETIUM TC 99M SESTAMIBI 1 DOSE: 1 INJECTION INTRAVENOUS at 11:30

## 2023-08-29 RX ADMIN — DICYCLOMINE HYDROCHLORIDE 10 MG: 10 CAPSULE ORAL at 18:54

## 2023-08-29 RX ADMIN — PANTOPRAZOLE SODIUM 40 MG: 40 TABLET, DELAYED RELEASE ORAL at 08:54

## 2023-08-29 RX ADMIN — ASPIRIN 81 MG: 81 TABLET, COATED ORAL at 14:28

## 2023-08-29 RX ADMIN — CETIRIZINE HYDROCHLORIDE 10 MG: 10 TABLET, FILM COATED ORAL at 08:53

## 2023-08-29 RX ADMIN — Medication 10 ML: at 20:39

## 2023-08-29 RX ADMIN — TECHNETIUM TC 99M SESTAMIBI 1 DOSE: 1 INJECTION INTRAVENOUS at 12:39

## 2023-08-29 RX ADMIN — DOCUSATE SODIUM 50 MG AND SENNOSIDES 8.6 MG 2 TABLET: 8.6; 5 TABLET, FILM COATED ORAL at 20:39

## 2023-08-29 RX ADMIN — HYDROXYCHLOROQUINE SULFATE 200 MG: 200 TABLET, FILM COATED ORAL at 11:14

## 2023-08-29 RX ADMIN — Medication 500 UNITS: at 08:54

## 2023-08-29 RX ADMIN — ONDANSETRON 4 MG: 2 INJECTION INTRAMUSCULAR; INTRAVENOUS at 07:55

## 2023-08-29 RX ADMIN — GABAPENTIN 800 MG: 400 CAPSULE ORAL at 08:53

## 2023-08-29 RX ADMIN — AMLODIPINE BESYLATE 2.5 MG: 2.5 TABLET ORAL at 08:53

## 2023-08-29 RX ADMIN — SODIUM CHLORIDE 10 ML: 9 INJECTION, SOLUTION INTRAMUSCULAR; INTRAVENOUS; SUBCUTANEOUS at 12:38

## 2023-08-29 RX ADMIN — DIVALPROEX SODIUM 500 MG: 500 TABLET, FILM COATED, EXTENDED RELEASE ORAL at 08:53

## 2023-08-29 RX ADMIN — Medication 10 ML: at 08:54

## 2023-08-29 RX ADMIN — REGADENOSON 0.4 MG: 0.08 INJECTION, SOLUTION INTRAVENOUS at 12:38

## 2023-08-29 NOTE — ED NOTES
Nursing report ED to floor  Mary Anne Wolf  78 y.o.  female    HPI:   Chief Complaint   Patient presents with    Chest Pain    Abdominal Pain       Admitting doctor:   Sunil Ralph MD    Consulting provider(s):  Consults       Date and Time Order Name Status Description    8/29/2023 12:10 AM Hospitalist (on-call MD unless specified)               Admitting diagnosis:   The encounter diagnosis was Precordial pain.    Code status:   Current Code Status       Date Active Code Status Order ID Comments User Context       Prior            Allergies:   Milk-related compounds    Intake and Output  No intake or output data in the 24 hours ending 08/29/23 0103    Weight:       08/28/23  2207   Weight: 42.6 kg (94 lb)       Most recent vitals:   Vitals:    08/28/23 2300 08/28/23 2331 08/29/23 0028 08/29/23 0035   BP: (!) 208/103 175/88 169/84 157/82   BP Location: Left arm   Left arm   Patient Position: Sitting      Pulse: 76 70 75 71   Resp: 20 18 20 18   Temp:       SpO2:  96% 98% 97%   Weight:       Height:         Oxygen Therapy: room air    Active LDAs/IV Access:   Lines, Drains & Airways       Active LDAs       None                    Labs (abnormal labs have a star):   Labs Reviewed   TROPONIN - Abnormal; Notable for the following components:       Result Value    HS Troponin T 59 (*)     All other components within normal limits    Narrative:     High Sensitive Troponin T Reference Range:  <10.0 ng/L- Negative Female for AMI  <15.0 ng/L- Negative Male for AMI  >=10 - Abnormal Female indicating possible myocardial injury.  >=15 - Abnormal Male indicating possible myocardial injury.   Clinicians would have to utilize clinical acumen, EKG, Troponin, and serial changes to determine if it is an Acute Myocardial Infarction or myocardial injury due to an underlying chronic condition.        COMPREHENSIVE METABOLIC PANEL - Abnormal; Notable for the following components:    Glucose 105 (*)     Creatinine 1.21 (*)      Chloride 97 (*)     eGFR 45.7 (*)     All other components within normal limits    Narrative:     GFR Normal >60  Chronic Kidney Disease <60  Kidney Failure <15    The GFR formula is only valid for adults with stable renal function between ages 18 and 70.   CBC WITH AUTO DIFFERENTIAL - Abnormal; Notable for the following components:    MPV 13.0 (*)     Lymphocyte % 13.2 (*)     Lymphocytes, Absolute 0.68 (*)     All other components within normal limits   HIGH SENSITIVITIY TROPONIN T 2HR - Abnormal; Notable for the following components:    HS Troponin T 52 (*)     Troponin T Delta -7 (*)     All other components within normal limits    Narrative:     High Sensitive Troponin T Reference Range:  <10.0 ng/L- Negative Female for AMI  <15.0 ng/L- Negative Male for AMI  >=10 - Abnormal Female indicating possible myocardial injury.  >=15 - Abnormal Male indicating possible myocardial injury.   Clinicians would have to utilize clinical acumen, EKG, Troponin, and serial changes to determine if it is an Acute Myocardial Infarction or myocardial injury due to an underlying chronic condition.        BNP (IN-HOUSE) - Normal    Narrative:     Among patients with dyspnea, NT-proBNP is highly sensitive for the detection of acute congestive heart failure. In addition NT-proBNP of <300 pg/ml effectively rules out acute congestive heart failure with 99% negative predictive value.     LIPASE - Normal   CK - Normal   MAGNESIUM - Normal   RAINBOW DRAW    Narrative:     The following orders were created for panel order [place] Draw.  Procedure                               Abnormality         Status                     ---------                               -----------         ------                     Green Top (Gel)[[id number]]                                  Final result               Lavender Top[[id number]]                                     Final result               Gold Top - SST[[id number]]                                    Final result               Light Blue Top[[id number]]                                   Final result                 Please view results for these tests on the individual orders.   CBC AND DIFFERENTIAL    Narrative:     The following orders were created for panel order CBC & Differential.  Procedure                               Abnormality         Status                     ---------                               -----------         ------                     CBC Auto Differential[[id number]]        Abnormal            Final result                 Please view results for these tests on the individual orders.   GREEN TOP   LAVENDER TOP   GOLD TOP - SST   LIGHT BLUE TOP   EXTRA TUBES    Narrative:     The following orders were created for panel order Extra Tubes.  Procedure                               Abnormality         Status                     ---------                               -----------         ------                     Fischer Top[[id number]]                                         In process                   Please view results for these tests on the individual orders.   GRAY TOP       Meds given in ED:   Medications   sodium chloride 0.9 % flush 10 mL (has no administration in time range)   aluminum-magnesium hydroxide-simethicone (MAALOX MAX) 400-400-40 MG/5ML suspension 15 mL (15 mL Oral Given 8/28/23 2321)   Lidocaine Viscous HCl (XYLOCAINE) 2 % solution 15 mL (15 mL Mouth/Throat Given 8/28/23 2323)   aspirin chewable tablet 324 mg (324 mg Oral Given 8/28/23 2320)           NIH Stroke Scale:       Isolation/Infection(s):  No active isolations   No active infections     COVID Testing  Collected no  Resulted n/a    Nursing report ED to floor:  Mental status: alert and oriented  Ambulatory status: 1 assist, uses cane  Precautions: none    ED nurse phone [phone]

## 2023-08-29 NOTE — CONSULTS
[facility] Consult    Referring Provider: Wade Richey MD    Reason for Consultation: Chest pain    Patient Care Team:  Errol Ruby MD as PCP - General (Family Medicine)    Chief complaint   Chief Complaint   Patient presents with    Chest Pain    Abdominal Pain       Subjective .     History of present illness:     Mary Anne Wolf is a 78-year-old -American female with medical history of lupus, chronic tobacco use, arthritis, diverticular disease, GERD, seizures who presented to the ER on 8-28-23 with symptoms of nausea, shortness of breath, epigastric pain and chest pain.  Described the symptoms as aching and sharp, radiating to her upper back, occurring x1 day, constant, worsening which prompted her ER visit.  Additional symptoms of abdominal pain.  She denies any symptoms worsening after meals.  Denies exertional pain.  She reports pain will come and go as it pleases.  Denies any previous cardiac history or previous cardiac work-up.    High-sensitivity troponin mildly elevated at 52, 49.  proBNP normal.  Hemoglobin at 10,000 consistent with anemia.  Chest x-ray clear.  EKG showing sinus rhythm, no acute ST-T wave changes.    The CVD Risk score (D'Agostino, et al., 2008) failed to calculate for the following reasons:    The 2008 CVD risk score is only valid for ages 30 to 74    The patient has a prior MI, stroke, CHF, or peripheral vascular disease diagnosis      Review of Systems  Review of Systems   Constitutional:  Negative for activity change, chills, fatigue, fever and unexpected weight change.   HENT:  Negative for hearing loss, nosebleeds, tinnitus, trouble swallowing and voice change.    Eyes:  Negative for visual disturbance.   Respiratory:  Negative for apnea, cough, chest tightness, shortness of breath and wheezing.    Cardiovascular:  Positive for chest pain. Negative for palpitations and leg swelling.   Gastrointestinal:  Positive for abdominal distention and abdominal pain.  Negative for blood in stool.   Endocrine: Negative for cold intolerance, heat intolerance, polydipsia, polyphagia and polyuria.   Genitourinary: Negative.    Musculoskeletal:  Negative for arthralgias and back pain.   Skin: Negative.    Allergic/Immunologic: Negative.    Neurological:  Negative for seizures, syncope, speech difficulty, numbness and headaches.   Hematological:  Negative for adenopathy. Does not bruise/bleed easily.   Psychiatric/Behavioral:  Negative for agitation, behavioral problems and suicidal ideas. The patient is not nervous/anxious.      History  Past Medical History:   Diagnosis Date    Acute posthemorrhagic anemia     Arthritis     Backache     LLE radiculopathy    Cigarette smoker     Discoid lupus erythematosus     Diverticular disease of colon     incomplete colonoscopy to Hepatic flexure.  Rec ACBE, pt. considering    Dizziness and giddiness     Dysphagia     Esophagitis     grade III    Gastroesophageal reflux disease     rec pt try RX prilosec    H/O screening mammography     Localization-related (focal) (partial) symptomatic epilepsy and epileptic syndromes with complex partial seizures, intractable, without status epilepticus     Neuropathy     Neuropathy     Nicotine dependence     Osteopenia     Periumbilical pain     Seizure disorder     Seizure disorder     history of complex partial seizure    Stricture of esophagus     Systemic lupus erythematosus     Vertigo     Vertigo    ,   Past Surgical History:   Procedure Laterality Date    CATARACT EXTRACTION W/ INTRAOCULAR LENS IMPLANT Right 4/23/2021    Procedure: REMOVE CATARACT AND IMPLANT INTRAOCULAR LENS;  Surgeon: Trenton Peres MD;  Location: [facility];  Service: Ophthalmology;  Laterality: Right;    CATARACT EXTRACTION W/ INTRAOCULAR LENS IMPLANT Left 4/30/2021    Procedure: REMOVE CATARACT AND IMPLANT  INTRAOCULAR LENS;  Surgeon: Trenton Peres MD;  Location: [facility];  Service: Ophthalmology;  Laterality:  Left;    COLONOSCOPY  08/05/2015    COLONOSCOPY  07/08/2014    REFUSED BY PATIENT    COLONOSCOPY  08/05/2015    a diverticulum was found in the sigmoid colon    ENDOSCOPY  08/05/2015    Esophageal stricture was present. Dilatatin was performed. Esophagitis . Biopsy taken. Normal stomach. Normal duodenum    ENDOSCOPY N/A 12/6/2018    Procedure: ESOPHAGOGASTRODUODENOSCOPY;  Surgeon: Cory Bliss DO;  Location: [facility] ENDOSCOPY;  Service: Gastroenterology    UPPER GASTROINTESTINAL ENDOSCOPY  08/05/2015   ,   Family History   Problem Relation Age of Onset    Lupus Mother     Rheum arthritis Mother     Lupus Sister    ,   Social History     Tobacco Use    Smoking status: Every Day     Packs/day: 0.25     Types: Cigarettes    Smokeless tobacco: Never   Vaping Use    Vaping Use: Never used   Substance Use Topics    Alcohol use: No    Drug use: No   ,   Medications Prior to Admission   Medication Sig Dispense Refill Last Dose    albuterol sulfate  (90 Base) MCG/ACT inhaler INHALE 2 PUFFS BY MOUTH EVERY 6 HOURS AS NEEDED FOR WHEEZING 18 g 0     amLODIPine (NORVASC) 2.5 MG tablet Take 1 tablet by mouth once daily 90 tablet 0 8/28/2023    baclofen (LIORESAL) 10 MG tablet Take 1 tablet by mouth 2 (Two) Times a Day As Needed for Muscle Spasms (pain).   8/28/2023    cetirizine (zyrTEC) 10 MG tablet Take 1 tablet by mouth Daily. 30 tablet 11 8/28/2023    dicyclomine (BENTYL) 10 MG capsule 3 times daily as needed abdominal cramps 60 capsule 1 8/28/2023    divalproex (DEPAKOTE ER) 250 MG 24 hr tablet TAKE 2 TABLETS BY MOUTH ONCE DAILY FOR HEADACHE 60 tablet 0 8/28/2023    gabapentin (NEURONTIN) 800 MG tablet 1 twice daily for legs new dosing 180 tablet 2 8/28/2023    hydroxychloroquine (PLAQUENIL) 200 MG tablet Take 1 tablet by mouth Daily. Take one tablet daily except on Sundays. On Sundays, take 2 tablets.   8/28/2023    ipratropium-albuterol (DUO-NEB) 0.5-2.5 mg/3 ml nebulizer Take 3 mL by nebulization Every 4 (Four)  "Hours As Needed for Wheezing. 360 mL 3     lansoprazole (PREVACID) 30 MG capsule Take 1 capsule by mouth Daily.   8/29/2023    meclizine (ANTIVERT) 25 MG tablet TAKE 1 TABLET BY MOUTH THREE TIMES DAILY AS NEEDED FOR DIZZINESS 30 tablet 0 8/28/2023    VITAMIN D PO Take 500 Units by mouth Daily.   8/28/2023    doxycycline (MONODOX) 100 MG capsule 1 po bid x 5 days with food for total of 10 days 10 capsule 0       ALLERGIES  Milk-related compounds    Objective     Vital Sign Min/Max for last 24 hours  Temp  Min: 98.2 °F (36.8 °C)  Max: 98.4 °F (36.9 °C)   BP  Min: 146/70  Max: 213/100   Pulse  Min: 64  Max: 83   Resp  Min: 18  Max: 20   SpO2  Min: 93 %  Max: 99 %   No data recorded   Weight  Min: 42.5 kg (93 lb 9.6 oz)  Max: 42.6 kg (94 lb)     Flowsheet Rows      Flowsheet Row First Filed Value   Admission Height 162.6 cm (64\") Documented at 08/28/2023 2207   Admission Weight 42.6 kg (94 lb) Documented at 08/28/2023 2207               Physical Exam:  Physical Exam  Vitals and nursing note reviewed.   Constitutional:       General: She is not in acute distress.     Appearance: She is cachectic. She is not ill-appearing, toxic-appearing or diaphoretic.   HENT:      Head: Normocephalic.      Right Ear: External ear normal.      Left Ear: External ear normal.   Eyes:      General: Lids are normal.      Pupils: Pupils are equal, round, and reactive to light.   Neck:      Thyroid: No thyromegaly.      Vascular: No carotid bruit or JVD.      Trachea: No tracheal deviation.   Cardiovascular:      Rate and Rhythm: Normal rate and regular rhythm.      Heart sounds: Murmur heard.   Systolic murmur is present.     No friction rub. No gallop.   Pulmonary:      Effort: Pulmonary effort is normal. No respiratory distress.      Breath sounds: Normal breath sounds. No stridor. No wheezing or rales.   Chest:      Chest wall: No tenderness.   Abdominal:      General: Bowel sounds are normal. There is no distension.      Palpations: " Abdomen is soft.      Tenderness: There is no abdominal tenderness.   Musculoskeletal:      Right lower leg: No edema.      Left lower leg: No edema.   Skin:     General: Skin is warm and dry.      Findings: No erythema or rash.   Neurological:      Mental Status: She is alert and oriented to person, place, and time.      Cranial Nerves: No cranial nerve deficit.      Deep Tendon Reflexes: Reflexes are normal and symmetric. Reflexes normal.   Psychiatric:         Behavior: Behavior normal.         Thought Content: Thought content normal.         Judgment: Judgment normal.          Results Review:     Results from last 7 days   Lab Units 08/29/23  0525 08/28/23  2226   SODIUM mmol/L 137 140   POTASSIUM mmol/L 3.9 3.9   CHLORIDE mmol/L 97* 97*   CO2 mmol/L 31.0* 29.0   BUN mg/dL 18 17   CREATININE mg/dL 0.95 1.21*   CALCIUM mg/dL 9.3 10.2   BILIRUBIN mg/dL  --  0.2   ALK PHOS U/L  --  66   ALT (SGPT) U/L  --  9   AST (SGOT) U/L  --  23   GLUCOSE mg/dL 85 105*       Estimated Creatinine Clearance: 32.2 mL/min (by C-G formula based on SCr of 0.95 mg/dL).    Results from last 7 days   Lab Units 08/28/23  2226   MAGNESIUM mg/dL 2.1       Results from last 7 days   Lab Units 08/29/23  0525 08/28/23  2226   WBC 10*3/mm3 4.42 5.15   HEMOGLOBIN g/dL 10.4* 12.2   PLATELETS 10*3/mm3 134* 170             Lab Results   Component Value Date    CKTOTAL 114 08/28/2023    TROPONINT 49 (H) 08/29/2023     Lab Results   Component Value Date    PROBNP 455.2 08/28/2023       I/O last 3 completed shifts:  In: -   Out: 400 [Urine:400]    Cardiographics  ECG/EMG Results (last 24 hours)       Procedure Component Value Units Date/Time    ECG 12 Lead Chest Pain [[id number]] Collected: 08/28/23 2157     Updated: 08/28/23 2208     QT Interval 374 ms      QTC Interval 441 ms     Narrative:      Test Reason : CP  Blood Pressure :   */*   mmHG  Vent. Rate :  84 BPM     Atrial Rate :  84 BPM     P-R Int : 146 ms          QRS Dur :  82 ms      QT Int :  374 ms       P-R-T Axes :  68  49  67 degrees     QTc Int : 441 ms    Normal sinus rhythm  Possible Left atrial enlargement  Borderline ECG  When compared with ECG of 19-FEB-2023 12:12,  Premature atrial complexes are no longer Present    Referred By:            Confirmed By:     ECG 12 Lead Chest Pain [[id number]] Resulted: 08/29/23 0011     Updated: 08/29/23 0011    ECG 12 Lead Chest Pain [[id number]] Collected: 08/29/23 0554     Updated: 08/29/23 0602     QT Interval 416 ms      QTC Interval 439 ms     Narrative:      Test Reason : Chest Pain  Blood Pressure :   */*   mmHG  Vent. Rate :  67 BPM     Atrial Rate :  67 BPM     P-R Int : 150 ms          QRS Dur :  84 ms      QT Int : 416 ms       P-R-T Axes :  50  25  46 degrees     QTc Int : 439 ms    Sinus rhythm with occasional Premature ventricular complexes  Possible Anterior infarct , age undetermined  Abnormal ECG  When compared with ECG of 28-AUG-2023 21:57,  Premature ventricular complexes are now Present  ST no longer elevated in Inferior leads    Referred By:            Confirmed By:                 XR Chest 1 View    Result Date: 8/28/2023  1. No acute cardiopulmonary disease.      Intake/Output         08/28/23 0700 - 08/29/23 0659 08/29/23 0700 - 08/30/23 0659    Intake (ml) -- 30    Output (ml) 300 200    Net (ml) -300 -170    Last Weight 42.5 kg (93 lb 9.6 oz) --              Assessment & Plan       Chest pain    Normocytic anemia    Underweight    CKD (chronic kidney disease) stage 2, GFR 60-89 ml/min    #1. Chest pain: Somewhat atypical in nature with GI involvement.  She does have multiple cardiac risk factors including hypertension, tobacco use.  Mildly elevated high-sensitivity troponin.  EKG negative for acute findings. She is hemodynamically stable. Currently CP free. Have recommended Nuclear stress test 1 day for ischemic evaluation/cardiac risk verification. R.B discussed and patient agreeable to proceed.   -Echocardiogram pending to assess  structural heart disease and regional wall motion abnormalities  -Continue Norvasc 2.5 mg,, Protonix, ASA 81mg    #2.  Hypertension: She was hypertensive upon arrival at 213/100.  Blood pressure improved.  She has been on Norvasc.    Mary Anne Wolf  reports that she has been smoking cigarettes. She has been smoking an average of .25 packs per day. She has never used smokeless tobacco.. I have educated her on the risk of diseases from using tobacco products such as cancer, COPD, and heart disease.     I advised her to quit and she is not willing to quit.    I spent 3  minutes counseling the patient.    Patient's (Body mass index is 16.07 kg/m².) Low BMI.  Dietary consult to be considered per primary team    I spent 45 minutes caring for Mary Anne on this date of service. This time includes time spent by me in the following activities: preparing for the visit, reviewing tests, obtaining and/or reviewing a separately obtained history, performing a medically appropriate examination and/or evaluation, counseling and educating the patient/family/caregiver, ordering medications, tests, or procedures, referring and communicating with other health care professionals, documenting information in the medical record, independently interpreting results and communicating that information with the patient/family/caregiver, and care coordination.       I discussed the patient's findings and my recommendations with patient and nursing staff and Dr. Grubbs. Thank you for the consult. We will continue to follow.           This document has been electronically signed by MARINE Marino on August 29, 2023 11:47 CDT   Electronically signed by MARINE Marino, 08/29/23, 11:47 AM CDT.    I personally saw and examined Mary Anne Wolf after the APRN.  I personally performed a history and physical examination of the patient.  I personally reviewed independent findings and plan of care.  I discussed management with the APRN.   "I agree with the APRN's documentation.    78-year-old female with history of lupus, GERD and seizure presented with symptoms of shortness of breath nausea epigastric pain.  Was noted to have mildly elevated troponins were consulted for further evaluation.    Vitals:    08/29/23 1045 08/29/23 1530 08/29/23 1548 08/29/23 1550   BP: 146/70  142/90    BP Location: Left arm  Left arm    Patient Position: Lying  Lying    Pulse: 65 74 74    Resp: 18  18    Temp: 98.2 °F (36.8 °C)  98.1 °F (36.7 °C)    TempSrc: Temporal  Temporal    SpO2: 93%  94%    Weight:    42.5 kg (93 lb 11.1 oz)   Height:    162.6 cm (64.02\")     1.  Chest pain: Mixed features.  Troponin was mildly elevated.  Blood pressure was significantly elevated on arrival but EKG without significant ischemic changes.  Echocardiac without significant wall motion normalities.  Patient be continued on appropriate medical therapy and plan on stress test for further evaluation.    Thank you for the consult continue to follow.          Electronically signed by Margarita Grubbs MD, 08/29/23, 6:10 PM CDT.               "

## 2023-08-29 NOTE — H&P
[facility] Medicine Admission      Date of Admission: 8/28/2023      Primary Care Physician: Errol Ruby MD    Chief Complaint: Chest pain     HPI: At approximately 4 PM yesterday patient developed sudden onset central chest pain that radiated into her back and epigastrium.  She describes it as a sharp stabbing pain that was constant until approximately 10 PM, when she arrived in the emergency department.  Patient had an associated nausea and shortness of breath but no other cardiorespiratory symptoms.  The patient made herself vomit to try to feel better.  She has noticed that she has some increased abdominal distention but otherwise has no other GI or  symptoms on review.    Concurrent Medical History:  has a past medical history of Acute posthemorrhagic anemia, Arthritis, Backache, Cigarette smoker, Discoid lupus erythematosus, Diverticular disease of colon, Dizziness and giddiness, Dysphagia, Esophagitis, Gastroesophageal reflux disease, H/O screening mammography, Localization-related (focal) (partial) symptomatic epilepsy and epileptic syndromes with complex partial seizures, intractable, without status epilepticus, Neuropathy, Neuropathy, Nicotine dependence, Osteopenia, Periumbilical pain, Seizure disorder, Seizure disorder, Stricture of esophagus, Systemic lupus erythematosus, Vertigo, and Vertigo.    Past Surgical History:  has a past surgical history that includes Colonoscopy (08/05/2015); Colonoscopy (07/08/2014); Colonoscopy (08/05/2015); Esophagogastroduodenoscopy (08/05/2015); Upper gastrointestinal endoscopy (08/05/2015); Esophagogastroduodenoscopy (N/A, 12/6/2018); Cataract extraction w/ intraocular lens implant (Right, 4/23/2021); and Cataract extraction w/ intraocular lens implant (Left, 4/30/2021).    Family History: family history includes Lupus in her mother and sister; Rheum arthritis in her mother.     Social History:  reports that she has been  smoking cigarettes. She has been smoking an average of .25 packs per day. She has never used smokeless tobacco. She reports that she does not drink alcohol and does not use drugs.    Allergies:   Allergies   Allergen Reactions    Milk-Related Compounds GI Intolerance       Medications:   Prior to Admission medications    Medication Sig Start Date End Date Taking? Authorizing Provider   albuterol sulfate  (90 Base) MCG/ACT inhaler INHALE 2 PUFFS BY MOUTH EVERY 6 HOURS AS NEEDED FOR WHEEZING 7/6/23   Errol Ruby MD   amLODIPine (NORVASC) 2.5 MG tablet Take 1 tablet by mouth once daily 5/30/23   Errol Ruby MD   cetirizine (zyrTEC) 10 MG tablet Take 1 tablet by mouth Daily. 11/17/17   Bess Suárez DO   clobetasol (Temovate) 0.05 % cream Apply to hand twice daily for 10 days and as needed 3/15/22   Errol Ruby MD   dicyclomine (BENTYL) 10 MG capsule 3 times daily as needed abdominal cramps 11/9/22   Errol Ruby MD   divalproex (DEPAKOTE ER) 250 MG 24 hr tablet TAKE 2 TABLETS BY MOUTH ONCE DAILY FOR HEADACHE 7/31/23   Errol Ruby MD   doxycycline (MONODOX) 100 MG capsule 1 po bid x 5 days with food for total of 10 days 2/23/23   Errol Ruby MD   fluticasone (FLONASE) 50 MCG/ACT nasal spray 2 sprays into the nostril(s) as directed by provider Daily As Needed for Rhinitis.    Provider, MD Sasha   gabapentin (NEURONTIN) 800 MG tablet 1 twice daily for legs new dosing 3/21/23   Errol Ruby MD   ipratropium-albuterol (DUO-NEB) 0.5-2.5 mg/3 ml nebulizer Take 3 mL by nebulization Every 4 (Four) Hours As Needed for Wheezing. 12/20/21   Errol Ruby MD   lansoprazole (PREVACID) 30 MG capsule Take 1 capsule by mouth Daily.    Provider, MD Sasha   meclizine (ANTIVERT) 25 MG tablet TAKE 1 TABLET BY MOUTH THREE TIMES DAILY AS NEEDED FOR DIZZINESS 7/10/23   Errol Ruby MD   meloxicam (MOBIC) 15 MG tablet 1 PO Daily with food. 10/9/22   Zack Lewis MD   VITAMIN D PO Take 500  "Units by mouth Daily.    Provider, MD Sasha       Review of Systems:  Review of Systems   Constitutional:  Negative for chills, diaphoresis and fever.   HENT:  Negative for congestion, ear pain, rhinorrhea, sinus pressure, sneezing and sore throat.    Respiratory:  Positive for cough (\"little bit\") and shortness of breath.    Cardiovascular:  Positive for chest pain (radiating into epigastrium and back, 4 pm, lasted 6 hrs, constant, sharp/stabbing). Negative for palpitations and leg swelling.   Gastrointestinal:  Positive for abdominal distention, abdominal pain, nausea and vomiting. Negative for constipation and diarrhea.        Heartburn   Genitourinary:  Negative for difficulty urinating, dysuria, enuresis, frequency, hematuria and urgency.   Musculoskeletal:  Negative for arthralgias, joint swelling and myalgias.   Skin:  Negative for color change, rash and wound.   Neurological:  Negative for dizziness, syncope, light-headedness and headaches.   Psychiatric/Behavioral:  Negative for agitation, confusion and hallucinations.     Otherwise complete ROS is negative except as mentioned above.    Physical Exam:   Temp:  [98.2 °F (36.8 °C)-98.4 °F (36.9 °C)] 98.3 °F (36.8 °C)  Heart Rate:  [64-83] 68  Resp:  [18-20] 18  BP: (154-213)/() 166/85  Physical Exam  Vitals and nursing note reviewed.   Constitutional:       General: She is awake. She is not in acute distress.     Appearance: She is underweight. She is ill-appearing. She is not toxic-appearing or diaphoretic.   HENT:      Head: Normocephalic and atraumatic.      Nose: Nose normal. No congestion or rhinorrhea.      Mouth/Throat:      Pharynx: Oropharynx is clear. No oropharyngeal exudate or posterior oropharyngeal erythema.   Eyes:      General:         Right eye: No discharge.         Left eye: No discharge.      Extraocular Movements: Extraocular movements intact.      Conjunctiva/sclera: Conjunctivae normal.      Pupils: Pupils are equal, round, " and reactive to light.   Cardiovascular:      Rate and Rhythm: Normal rate and regular rhythm.      Pulses: Normal pulses.      Heart sounds: Murmur heard.     No friction rub. No gallop.   Pulmonary:      Effort: Pulmonary effort is normal. No respiratory distress.      Breath sounds: Normal breath sounds. No stridor. No wheezing, rhonchi or rales.   Abdominal:      General: There is distension.      Tenderness: There is abdominal tenderness in the epigastric area.   Musculoskeletal:         General: No swelling.      Right lower leg: No edema.      Left lower leg: No edema.   Skin:     Capillary Refill: Capillary refill takes less than 2 seconds.      Coloration: Skin is not jaundiced.      Findings: No bruising, erythema or rash.   Neurological:      General: No focal deficit present.      Mental Status: She is alert and oriented to person, place, and time. Mental status is at baseline.      Cranial Nerves: No cranial nerve deficit.   Psychiatric:         Behavior: Behavior normal. Behavior is cooperative.         Thought Content: Thought content normal.         Judgment: Judgment normal.         Results Reviewed:  I have personally reviewed current lab, radiology, and data and agree with results.  Lab Results (last 24 hours)       Procedure Component Value Units Date/Time    Basic Metabolic Panel [[id number]]  (Abnormal) Collected: 08/29/23 0525    Specimen: Blood Updated: 08/29/23 0653     Glucose 85 mg/dL      BUN 18 mg/dL      Creatinine 0.95 mg/dL      Sodium 137 mmol/L      Potassium 3.9 mmol/L      Chloride 97 mmol/L      CO2 31.0 mmol/L      Calcium 9.3 mg/dL      BUN/Creatinine Ratio 18.9     Anion Gap 9.0 mmol/L      eGFR 61.1 mL/min/1.73     Narrative:      GFR Normal >60  Chronic Kidney Disease <60  Kidney Failure <15    The GFR formula is only valid for adults with stable renal function between ages 18 and 70.    High Sensitivity Troponin T [[id number]]  (Abnormal) Collected: 08/29/23 0525    Specimen:  Blood Updated: 08/29/23 0640     HS Troponin T 49 ng/L     Narrative:      High Sensitive Troponin T Reference Range:  <10.0 ng/L- Negative Female for AMI  <15.0 ng/L- Negative Male for AMI  >=10 - Abnormal Female indicating possible myocardial injury.  >=15 - Abnormal Male indicating possible myocardial injury.   Clinicians would have to utilize clinical acumen, EKG, Troponin, and serial changes to determine if it is an Acute Myocardial Infarction or myocardial injury due to an underlying chronic condition.         CBC (No Diff) [[id number]]  (Abnormal) Collected: 08/29/23 0525    Specimen: Blood Updated: 08/29/23 0628     WBC 4.42 10*3/mm3      RBC 3.57 10*6/mm3      Hemoglobin 10.4 g/dL      Comment: New Admit        Hematocrit 30.1 %      MCV 84.3 fL      MCH 29.1 pg      MCHC 34.6 g/dL      RDW 13.6 %      RDW-SD 41.9 fl      MPV 12.6 fL      Platelets 134 10*3/mm3     Extra Tubes [[id number]] Collected: 08/28/23 2226    Specimen: Blood, Venous Line Updated: 08/29/23 0230    Narrative:      The following orders were created for panel order Extra Tubes.  Procedure                               Abnormality         Status                     ---------                               -----------         ------                     [place]ischer Top[[id number]]                                         Final result                 Please view results for these tests on the individual orders.    Gray Top [[id number]] Collected: 08/28/23 2226    Specimen: Blood Updated: 08/29/23 0230     Extra Tube Hold for add-ons.     Comment: Auto resulted.       High Sensitivity Troponin T 2Hr [[id number]]  (Abnormal) Collected: 08/29/23 0027    Specimen: Blood from Arm, Left Updated: 08/29/23 0056     HS Troponin T 52 ng/L      Troponin T Delta -7 ng/L     Narrative:      High Sensitive Troponin T Reference Range:  <10.0 ng/L- Negative Female for AMI  <15.0 ng/L- Negative Male for AMI  >=10 - Abnormal Female indicating possible myocardial  injury.  >=15 - Abnormal Male indicating possible myocardial injury.   Clinicians would have to utilize clinical acumen, EKG, Troponin, and serial changes to determine if it is an Acute Myocardial Infarction or myocardial injury due to an underlying chronic condition.         High Sensitivity Troponin T [[id number]]  (Abnormal) Collected: 08/28/23 2226    Specimen: Blood Updated: 08/28/23 2359     HS Troponin T 59 ng/L      Comment: Specimen hemolyzed.  Results may be affected.       Narrative:      High Sensitive Troponin T Reference Range:  <10.0 ng/L- Negative Female for AMI  <15.0 ng/L- Negative Male for AMI  >=10 - Abnormal Female indicating possible myocardial injury.  >=15 - Abnormal Male indicating possible myocardial injury.   Clinicians would have to utilize clinical acumen, EKG, Troponin, and serial changes to determine if it is an Acute Myocardial Infarction or myocardial injury due to an underlying chronic condition.         [place] Draw [[id number]] Collected: 08/28/23 2226    Specimen: Blood Updated: 08/28/23 2330    Narrative:      The following orders were created for panel order [place] Draw.  Procedure                               Abnormality         Status                     ---------                               -----------         ------                     Green Top (Gel)[[id number]]                                  Final result               Lavender Top[[id number]]                                     Final result               Gold Top - SST[[id number]]                                   Final result               Light Blue Top[[id number]]                                   Final result                 Please view results for these tests on the individual orders.    Green Top (Gel) [[id number]] Collected: 08/28/23 2226    Specimen: Blood Updated: 08/28/23 2330     Extra Tube Hold for add-ons.     Comment: Auto resulted.       Gold Top - SST [[id number]] Collected: 08/28/23 2226    Specimen: Blood  Updated: 08/28/23 2330     Extra Tube Hold for add-ons.     Comment: Auto resulted.       Light Blue Top [[id number]] Collected: 08/28/23 2226    Specimen: Blood Updated: 08/28/23 2330     Extra Tube Hold for add-ons.     Comment: Auto resulted       Lavender Top [[id number]] Collected: 08/28/23 2226    Specimen: Blood Updated: 08/28/23 2330     Extra Tube hold for add-on     Comment: Auto resulted       CK [[id number]]  (Normal) Collected: 08/28/23 2226    Specimen: Blood Updated: 08/28/23 2308     Creatine Kinase 114 U/L      Comment: Specimen hemolyzed.  Results may be affected.       Comprehensive Metabolic Panel [[id number]]  (Abnormal) Collected: 08/28/23 2226    Specimen: Blood Updated: 08/28/23 2308     Glucose 105 mg/dL      BUN 17 mg/dL      Creatinine 1.21 mg/dL      Sodium 140 mmol/L      Potassium 3.9 mmol/L      Comment: Specimen hemolyzed.  Results may be affected.        Chloride 97 mmol/L      CO2 29.0 mmol/L      Calcium 10.2 mg/dL      Total Protein 8.0 g/dL      Albumin 4.2 g/dL      ALT (SGPT) 9 U/L      Comment: Specimen hemolyzed.  Results may be affected.        AST (SGOT) 23 U/L      Comment: Specimen hemolyzed.  Results may be affected.        Alkaline Phosphatase 66 U/L      Total Bilirubin 0.2 mg/dL      Globulin 3.8 gm/dL      A/G Ratio 1.1 g/dL      BUN/Creatinine Ratio 14.0     Anion Gap 14.0 mmol/L      eGFR 45.7 mL/min/1.73     Narrative:      GFR Normal >60  Chronic Kidney Disease <60  Kidney Failure <15    The GFR formula is only valid for adults with stable renal function between ages 18 and 70.    Magnesium [[id number]]  (Normal) Collected: 08/28/23 2226    Specimen: Blood Updated: 08/28/23 2308     Magnesium 2.1 mg/dL     BNP [[id number]]  (Normal) Collected: 08/28/23 2226    Specimen: Blood Updated: 08/28/23 2256     proBNP 455.2 pg/mL     Narrative:      Among patients with dyspnea, NT-proBNP is highly sensitive for the detection of acute congestive heart failure. In addition  NT-proBNP of <300 pg/ml effectively rules out acute congestive heart failure with 99% negative predictive value.      Lipase [[id number]]  (Normal) Collected: 08/28/23 2226    Specimen: Blood Updated: 08/28/23 2254     Lipase 47 U/L     CBC & Differential [[id number]]  (Abnormal) Collected: 08/28/23 2226    Specimen: Blood Updated: 08/28/23 2236    Narrative:      The following orders were created for panel order CBC & Differential.  Procedure                               Abnormality         Status                     ---------                               -----------         ------                     CBC Auto Differential[[id number]]        Abnormal            Final result                 Please view results for these tests on the individual orders.    CBC Auto Differential [[id number]]  (Abnormal) Collected: 08/28/23 2226    Specimen: Blood Updated: 08/28/23 2236     WBC 5.15 10*3/mm3      RBC 4.40 10*6/mm3      Hemoglobin 12.2 g/dL      Hematocrit 37.7 %      MCV 85.7 fL      MCH 27.7 pg      MCHC 32.4 g/dL      RDW 14.3 %      RDW-SD 44.4 fl      MPV 13.0 fL      Platelets 170 10*3/mm3      Neutrophil % 72.6 %      Lymphocyte % 13.2 %      Monocyte % 11.8 %      Eosinophil % 1.6 %      Basophil % 0.6 %      Immature Grans % 0.2 %      Neutrophils, Absolute 3.74 10*3/mm3      Lymphocytes, Absolute 0.68 10*3/mm3      Monocytes, Absolute 0.61 10*3/mm3      Eosinophils, Absolute 0.08 10*3/mm3      Basophils, Absolute 0.03 10*3/mm3      Immature Grans, Absolute 0.01 10*3/mm3      nRBC 0.0 /100 WBC           Imaging Results (Last 24 Hours)       Procedure Component Value Units Date/Time    XR Chest 1 View [[id number]] Collected: 08/28/23 2305     Updated: 08/28/23 2308    Narrative:      XR CHEST 1 VIEW    HISTORY: Chest pain protocolChest Pain Protocol    COMPARISON: 6/8/2021.    FINDINGS:  Frontal view of the chest was obtained.    The lungs are clear. Cardiac silhouette is within normal limits. There is  no  significant pleural effusion or pneumothorax.    The osseous structures and surrounding soft tissues demonstrate no acute  abnormality.    Upper abdomen is unremarkable.        Impression:      1. No acute cardiopulmonary disease.                  Assessment:    Active Hospital Problems    Diagnosis     **Chest pain     Normocytic anemia     Underweight     CKD (chronic kidney disease) stage 2, GFR 60-89 ml/min          Medical Decision Making  Number and Complexity of problems: 4 - high  Differential Diagnosis: As above    Conditions and Status:        Condition is improving.     [facility] Data  External documents reviewed: Chart Review  My EKG interpretation: Normal sinus rhythm  My plain film interpretation: X-ray reveals no acute cardiopulmonary pathology  Tests considered but not ordered: Nil     Decision rules/scores evaluated (example [id number], [place], etc):   Heart   Jennifer     Discussed with: the patient and her family at bedside     Treatment Plan  Cardiology consultation in the a.m.  Serial troponins and EKGs  VTE prophylaxis  Home medication reconciliation  Daily labs  Dietitian  PT and OT  Iron profile, vitamin B12, and folate levels      Care Planning  Shared decision making: with the patient and her family at bedside  Code status and discussions: Full    Disposition  Social Determinants of Health that impact treatment or disposition: Nil  I expect the patient to be discharged to Home in 1-3 days.       Sunil Ralph MD    Electronically signed by Sunil Ralph MD, 08/29/23, 7:39 AM CDT.

## 2023-08-29 NOTE — PLAN OF CARE
Goal Outcome Evaluation: Cardiology consult. Patient had nuclear stress test and echo today. Results currently pending. NPO after midnight pending stress test results.

## 2023-08-29 NOTE — CONSULTS
Adult Nutrition  Assessment/PES    Patient Name:  Mary Anne Wolf  YOB: 1945  MRN: [id number]  Admit Date:  8/28/2023    Assessment Date:  8/29/2023    Comments:  Nutrition Assessment for BMI.  Pt admitted with chest pain with hx of Normcytic Anemia and CKD stage 2.  She has a BMI of 16.07 and is 78% of her IBW.  She reports a good appetite pta but her family reports variable intake.  She has lost ~4# since Feb 2023 from her UBW of 97#.  CBW is 93#.  She is edentulous but denies any need for diet modification with no eating or swallowing difficulties.    She meets criteria for severe chronic malnutrition based on NFPE revealing fat loss and muscle wasting present.    RD will add Boost to trays and monitor POC and po intake.      Reason for Assessment       Row Name 08/29/23 6393          Reason for Assessment    Reason For Assessment per organizational policy     Diagnosis hematological/related complications;other (see comments)  Chest pain     Identified At Risk by Screening Criteria BMI                    Nutrition/Diet History       Row Name 08/29/23 7272          Nutrition/Diet History    Typical Intake (Food/Fluid/EN/PN) Pt claims that her appetite is good but her niece said that it is variable.  Some days she eats good and some days she does not.  UBW is 97# so she has lost about 4#.  She is willing to try boost.  Denies any eating difficulties                    Labs/Tests/Procedures/Meds       Row Name 08/29/23 0027          Labs/Procedures/Meds    Lab Results Reviewed reviewed, pertinent        Diagnostic Tests/Procedures    Diagnostic Test/Procedure Reviewed reviewed, pertinent        Medications    Pertinent Medications Reviewed reviewed, pertinent                    Physical Findings       Row Name 08/29/23 6488          Physical Findings    Overall Physical Appearance Per NFPE pt with fat loss and muscle wasting present                    Estimated/Assessed Needs - Anthropometrics        "Row Name 08/29/23 1557 08/29/23 1550       Anthropometrics    Height -- 162.6 cm (64.02\")    Weight -- 42.5 kg (93 lb 11.1 oz)    Height for Calculation 1.626 m (5' 4\") --    Weight for Calculation 54.4 kg (120 lb) --       Estimated/Assessed Needs    Additional Documentation Additional Requirements (Group);Fluid Requirements (Group);Modified [place] Equation (Group);Estimated Calorie Needs (Group);KCAL/KG (Group);[place]-St. Jeor Equation (Group);Protein Requirements (Group) --       Estimated Calorie Needs    Estimated Calorie Need Method kcal/kg --       KCAL/KG    KCAL/KG 25 Kcal/Kg (kcal) --    25 Kcal/Kg (kcal) 1360.8 --       [place]-St. Jeor Equation    RMR ([place]-St. Jeor Equation) 1004.574 --       Protein Requirements    Weight Used For Protein Calculations 54.4 kg (120 lb) --    Est Protein Requirement Amount (gms/kg) 1.2 gm protein --    Estimated Protein Requirements (gms/day) 65.32 --       Fluid Requirements    Fluid Requirements (mL/day) 1500 --    Estimated Fluid Requirement Method other (see comments)  minimal fluid requirement --    RDA Method (mL) 1500 --                   Nutrition Prescription Ordered       Row Name 08/29/23 1558          Nutrition Prescription PO    Current PO Diet Regular     Common Modifiers Cardiac                    Evaluation of Received Nutrient/Fluid Intake       Row Name 08/29/23 1558          PO Evaluation    Number of Days PO Intake Evaluated Insufficient Data     Number of Meals 2     % PO Intake 0/50                    Malnutrition Severity Assessment       Row Name 08/29/23 1559          Malnutrition Severity Assessment    Malnutrition Type Chronic Disease - Related Malnutrition        Unintentional Weight Loss     Unintentional Weight Loss Findings --  4% wt loss in the past 6 months        Muscle Loss    Loss of Muscle Mass Findings Severe     [place] Region Severe - deep hollowing/scooping, lack of muscle to touch, facial bones well defined     Clavicle " Bone Region Severe - protruding prominent bone     Acromion Bone Region Severe - squared shoulders, bones, and acromion process protrusion prominent     Scapular Bone Region Severe - prominent bones, depressions easily visible between ribs, scapula, spine, shoulders     Dorsal Hand Region Severe - prominent depression     Patellar Region Severe - prominent bone, square looking, very little muscle definition     Anterior Thigh Region Severe - line/depression along thigh, obviously thin     Posterior Calf Region Severe - thin with very little definition/firmness        Fat Loss    Subcutaneous Fat Loss Findings Severe     Orbital Region  Severe - pronounced hollowness/depression, dark circles, loose saggy skin     Upper Arm Region Severe - mostly skin, very little space between folds, fingers touch     Thoracic & Lumbar Region Severe - ribs visible with prominent depressions, iliac crest very prominent        Criteria Met (Must meet criteria for severity in at least 2 of these categories: M Wasting, Fat Loss, Fluid, Secondary Signs, Wt. Status, Intake)    Patient meets criteria for  Severe Malnutrition                     Problem/Interventions:   Problem 1       Row Name 08/29/23 1600          Nutrition Diagnoses Problem 1    Problem 1 Malnutrition   chronic     Etiology (related to) Other (comment)     Other Inadequate energy intake and/or increased energy expenditure     Signs/Symptoms (evidenced by) BMI;% IBW     BMI 16 - 16.9     Percent (%) IBW 78 %     Other Comment Per NFPE pt with fat loss and muscle wasting present.                    Problem 2       Row Name 08/29/23 1602          Nutrition Diagnoses Problem 2    Problem 2 Increased Nutrient Needs     Macronutrient Kcal;Protein     Etiology (related to) Other (comment)     Other Undernutrition     Signs/Symptoms (evidenced by) BMI;% IBW     BMI 16 - 16.9     Percent (%) IBW 78 %     Other Comment increased nutrient needs                        Intervention  Goal       Row Name 08/29/23 1602          Intervention Goal    General Meet nutritional needs for age/condition;Reduce/improve symptoms     PO Tolerate PO;Increase intake;Meet estimated needs     Weight Maintain weight  goal is for wt gain after hospitalization                    Nutrition Intervention       Row Name 08/29/23 1603          Nutrition Intervention    RD/Tech Action Follow Tx progress;Care plan reviewd;Encourage intake;Recommend/ordered;Advise alternate selection;Advise available snack;Interview for preference;Menu provided     Recommended/Ordered Supplement                    Nutrition Prescription       Row Name 08/29/23 1603          Nutrition Prescription PO    PO Prescription Begin/change supplement     Supplement Boost Plus     Supplement Frequency 3 times a day     New PO Prescription Ordered? Yes                    Education/Evaluation       Row Name 08/29/23 1603          Education    Education Education topics;Advised regarding habits/behavior     Education Topics Basic nutrition;Protein     Advised Regarding Habits/Behavior Increased nutrient density;Snacks;Use supplement;Food choices        Monitor/Evaluation    Monitor Per protocol;I&O;PO intake;Supplement intake;Pertinent labs;Weight;Skin status;Symptoms     Education Follow-up Reinforce PRN                     Electronically signed by:  Neda Cui RD  08/29/23 16:05 CDT

## 2023-08-29 NOTE — PLAN OF CARE
Goal Outcome Evaluation:  Plan of Care Reviewed With: caregiver, patient        Progress: no change  Outcome Evaluation: Nutrition Assessment:  Pt with a BMi of 16.07 and is 78% of her IBW.  She meets criteria for severe chronic malnutrition based on NFPE revealing fat loss and muscle wasting present.  Will add supplements and monitor oral intake and POC

## 2023-08-29 NOTE — PROGRESS NOTES
[facility] Medicine Services  INPATIENT PROGRESS NOTE    Length of Stay: 0  Date of Admission: 8/28/2023  Primary Care Physician: Errol Ruby MD    Subjective   Chief Complaint: Nausea    HPI:  This is a 78 year old female with PMH of arthritis, lupus, diverticular disease, GERD and seizures that presented to [facility] on 8/28/2023 with complaints of nausea, shortness of air and chest pain.      Patient was noted to have an elevated troponin of 59.  Cardiology was consulted.     Review of Systems   Constitutional:  Negative for activity change and fatigue.   HENT:  Negative for ear pain and sore throat.    Eyes:  Negative for pain and discharge.   Respiratory:  Positive for shortness of breath. Negative for cough.    Cardiovascular:  Positive for chest pain. Negative for palpitations.   Gastrointestinal:  Positive for nausea. Negative for abdominal pain.   Endocrine: Negative for cold intolerance and heat intolerance.   Genitourinary:  Negative for difficulty urinating and dysuria.   Musculoskeletal:  Negative for arthralgias and gait problem.   Skin:  Negative for color change and rash.   Neurological:  Negative for dizziness and weakness.   Psychiatric/Behavioral:  Negative for agitation and confusion.         Objective    Temp:  [98.2 °F (36.8 °C)-98.4 °F (36.9 °C)] 98.3 °F (36.8 °C)  Heart Rate:  [64-83] 68  Resp:  [18-20] 18  BP: (154-213)/() 166/85    Physical Exam  Constitutional:       Appearance: She is well-developed.   HENT:      Head: Normocephalic and atraumatic.   Eyes:      Pupils: Pupils are equal, round, and reactive to light.   Cardiovascular:      Rate and Rhythm: Normal rate and regular rhythm.   Pulmonary:      Effort: Pulmonary effort is normal.      Breath sounds: Normal breath sounds.   Abdominal:      General: Bowel sounds are normal.      Palpations: Abdomen is soft.   Musculoskeletal:         General: Normal range of motion.      Cervical back: Normal range of motion and  neck supple.   Skin:     General: Skin is warm and dry.   Neurological:      Mental Status: She is alert and oriented to person, place, and time.   Psychiatric:         Behavior: Behavior normal.       Results Review:  I have reviewed the labs, radiology results, and diagnostic studies.    Laboratory Data:   Results from last 7 days   Lab Units 08/29/23  0525 08/28/23  2226   SODIUM mmol/L 137 140   POTASSIUM mmol/L 3.9 3.9   CHLORIDE mmol/L 97* 97*   CO2 mmol/L 31.0* 29.0   BUN mg/dL 18 17   CREATININE mg/dL 0.95 1.21*   GLUCOSE mg/dL 85 105*   CALCIUM mg/dL 9.3 10.2   BILIRUBIN mg/dL  --  0.2   ALK PHOS U/L  --  66   ALT (SGPT) U/L  --  9   AST (SGOT) U/L  --  23   ANION GAP mmol/L 9.0 14.0     Estimated Creatinine Clearance: 32.2 mL/min (by C-G formula based on SCr of 0.95 mg/dL).  Results from last 7 days   Lab Units 08/28/23  2226   MAGNESIUM mg/dL 2.1         Results from last 7 days   Lab Units 08/29/23  0525 08/28/23  2226   WBC 10*3/mm3 4.42 5.15   HEMOGLOBIN g/dL 10.4* 12.2   HEMATOCRIT % 30.1* 37.7   PLATELETS 10*3/mm3 134* 170           Culture Data:   No results found for: BLOODCX  No results found for: URINECX  No results found for: RESPCX  No results found for: WOUNDCX  No results found for: STOOLCX  No components found for: BODYFLD    Radiology Data:   Imaging Results (Last 24 Hours)       Procedure Component Value Units Date/Time    XR Chest 1 View [[id number]] Collected: 08/28/23 2305     Updated: 08/28/23 2308    Narrative:      XR CHEST 1 VIEW    HISTORY: Chest pain protocolChest Pain Protocol    COMPARISON: 6/8/2021.    FINDINGS:  Frontal view of the chest was obtained.    The lungs are clear. Cardiac silhouette is within normal limits. There is no  significant pleural effusion or pneumothorax.    The osseous structures and surrounding soft tissues demonstrate no acute  abnormality.    Upper abdomen is unremarkable.        Impression:      1. No acute cardiopulmonary disease.                I  have reviewed the patient's current medications.     Assessment/Plan     Active Hospital Problems    Diagnosis     **Chest pain     Normocytic anemia     Underweight     CKD (chronic kidney disease) stage 2, GFR 60-89 ml/min        Plan:     Chest pain, rule out ACS:  Serial cardiac enzymes, EKG and continuous telemetry.  Echo and stress test pending.  Cardiology consult appreciated.   Normocytic anemia/ iron deficiency:  Iron started.   History of seizures:  Continue home Depakote.   Hypertension:  Continue home amlodipine.  Lupus:  Continue home hydroxychloroquine.   GERD:  Continue home PPI.     VTE PPX:  Lovenox      Medical Decision Making  Number and Complexity of problems: 6/High  Differential Diagnosis: GERD    Conditions and Status:        Condition is improving.     [facility] Data  External documents reviewed: Yes  My EKG interpretation: PVCs   My plain film interpretation: As per radiology   Tests considered but not ordered: None     Decision rules/scores evaluated (example [id number], Wells, etc): Heart     Discussed with: Patient     Treatment Plan  As above    Care Planning  Shared decision making: None  Code status and discussions: Full    Disposition  Social Determinants of Health that impact treatment or disposition: None  I expect the patient to be discharged to home in 1-3 days.     I confirmed that the patient's Advance Care Plan is present, code status is documented, or surrogate decision maker is listed in the patient's medical record.      I have utilized all available immediate resources to obtain, update, or review the patient's current medications.         This document has been electronically signed by MARINE Hamilton on August 29, 2023 10:39 CDT

## 2023-08-29 NOTE — ED PROVIDER NOTES
Subjective   History of Present Illness  CP abd pain began yesterday and worsened today    Chest Pain  Pain location:  Epigastric and L chest  Pain quality: aching and sharp    Pain radiates to:  Upper back  Pain severity:  Moderate  Duration:  1 day  Timing:  Constant  Progression:  Worsening  Chronicity:  New  Relieved by:  Nothing  Worsened by:  Nothing  Associated symptoms: abdominal pain    Associated symptoms: no cough, no diaphoresis, no dizziness, no dysphagia, no fatigue, no fever, no headache, no nausea, no shortness of breath, no vomiting and no weakness    Risk factors: hypertension and smoking    Risk factors: no coronary artery disease and no diabetes mellitus    Abdominal Pain  Associated symptoms: chest pain    Associated symptoms: no chills, no cough, no diarrhea, no dysuria, no fatigue, no fever, no nausea, no shortness of breath, no sore throat and no vomiting      Review of Systems   Constitutional:  Negative for appetite change, chills, diaphoresis, fatigue and fever.   HENT:  Negative for congestion, ear discharge, ear pain, nosebleeds, rhinorrhea, sinus pressure, sore throat and trouble swallowing.    Eyes:  Negative for discharge and redness.   Respiratory:  Negative for apnea, cough, chest tightness, shortness of breath and wheezing.    Cardiovascular:  Positive for chest pain.   Gastrointestinal:  Positive for abdominal pain. Negative for diarrhea, nausea and vomiting.   Endocrine: Negative for polyuria.   Genitourinary:  Negative for dysuria, frequency and urgency.   Musculoskeletal:  Negative for myalgias and neck pain.   Skin:  Negative for color change and rash.   Allergic/Immunologic: Negative for immunocompromised state.   Neurological:  Negative for dizziness, seizures, syncope, weakness, light-headedness and headaches.   Hematological:  Negative for adenopathy. Does not bruise/bleed easily.   Psychiatric/Behavioral:  Negative for behavioral problems and confusion.    All other  systems reviewed and are negative.    Past Medical History:   Diagnosis Date    Acute posthemorrhagic anemia     Arthritis     Backache     LLE radiculopathy    Cigarette smoker     Discoid lupus erythematosus     Diverticular disease of colon     incomplete colonoscopy to Hepatic flexure.  Rec ACBE, pt. considering    Dizziness and giddiness     Dysphagia     Esophagitis     grade III    Gastroesophageal reflux disease     rec pt try RX prilosec    H/O screening mammography     Localization-related (focal) (partial) symptomatic epilepsy and epileptic syndromes with complex partial seizures, intractable, without status epilepticus     Neuropathy     Neuropathy     Nicotine dependence     Osteopenia     Periumbilical pain     Seizure disorder     Seizure disorder     history of complex partial seizure    Stricture of esophagus     Systemic lupus erythematosus     Vertigo     Vertigo        Allergies   Allergen Reactions    Milk-Related Compounds GI Intolerance       Past Surgical History:   Procedure Laterality Date    CATARACT EXTRACTION W/ INTRAOCULAR LENS IMPLANT Right 4/23/2021    Procedure: REMOVE CATARACT AND IMPLANT INTRAOCULAR LENS;  Surgeon: Trenton Peres MD;  Location: [facility] OR;  Service: Ophthalmology;  Laterality: Right;    CATARACT EXTRACTION W/ INTRAOCULAR LENS IMPLANT Left 4/30/2021    Procedure: REMOVE CATARACT AND IMPLANT  INTRAOCULAR LENS;  Surgeon: Trenton Peres MD;  Location: [facility] OR;  Service: Ophthalmology;  Laterality: Left;    COLONOSCOPY  08/05/2015    COLONOSCOPY  07/08/2014    REFUSED BY PATIENT    COLONOSCOPY  08/05/2015    a diverticulum was found in the sigmoid colon    ENDOSCOPY  08/05/2015    Esophageal stricture was present. Dilatatin was performed. Esophagitis . Biopsy taken. Normal stomach. Normal duodenum    ENDOSCOPY N/A 12/6/2018    Procedure: ESOPHAGOGASTRODUODENOSCOPY;  Surgeon: Cory Bliss DO;  Location: [facility] ENDOSCOPY;  Service:  Gastroenterology    UPPER GASTROINTESTINAL ENDOSCOPY  08/05/2015       Family History   Problem Relation Age of Onset    Lupus Mother     Rheum arthritis Mother     Lupus Sister        Social History     Socioeconomic History    Marital status:    Tobacco Use    Smoking status: Every Day     Packs/day: 0.25     Types: Cigarettes    Smokeless tobacco: Never   Substance and Sexual Activity    Alcohol use: No    Drug use: No    Sexual activity: Defer           Objective   Physical Exam  Vitals and nursing note reviewed.   Constitutional:       Appearance: She is well-developed.   HENT:      Head: Normocephalic and atraumatic.      Nose: Nose normal.   Eyes:      General: No scleral icterus.        Right eye: No discharge.         Left eye: No discharge.      Conjunctiva/sclera: Conjunctivae normal.      Pupils: Pupils are equal, round, and reactive to light.   Neck:      Trachea: No tracheal deviation.   Cardiovascular:      Rate and Rhythm: Normal rate and regular rhythm.      Heart sounds: Normal heart sounds. No murmur heard.  Pulmonary:      Effort: Pulmonary effort is normal. No respiratory distress.      Breath sounds: Normal breath sounds. No stridor. No wheezing or rales.   Abdominal:      General: Bowel sounds are normal. There is no distension.      Palpations: Abdomen is soft. There is no mass.      Tenderness: There is no abdominal tenderness. There is no guarding or rebound.   Musculoskeletal:      Cervical back: Normal range of motion and neck supple.   Skin:     General: Skin is warm and dry.      Findings: No erythema or rash.   Neurological:      Mental Status: She is alert and oriented to person, place, and time.      Coordination: Coordination normal.   Psychiatric:         Behavior: Behavior normal.         Thought Content: Thought content normal.       ECG 12 Lead Chest Pain      Date/Time: 8/29/2023 12:05 AM  Performed by: Wade Richey MD  Authorized by: Wade Richey MD    Interpreted by physician  Rhythm: sinus rhythm  BPM: 84  ST Segments: ST segments normal             ED Course         HEART Score for Major Cardiac Events - MDCalc  Calculated on Aug 29 2023 1:07 AM  5 points -> Moderate Score (4-6 points) Risk of MACE of 12-16.6%.  If troponin is positive, many experts recommend further workup and admission even with a low HEART Score.                                  Medical Decision Making  Amount and/or Complexity of Data Reviewed  Labs: ordered.  Radiology: ordered.  ECG/medicine tests: ordered.    Risk  OTC drugs.  Prescription drug management.        Final diagnoses:   Precordial pain       ED Disposition  ED Disposition       ED Disposition   Decision to Admit    Condition   --    Comment   Level of Care: Telemetry [5]   Diagnosis: Chest pain [085814]   Admitting Physician: RUDDY ALLEN [[id number]]   Attending Physician: RUDDY ALLEN [[id number]]                 No follow-up provider specified.       Medication List      No changes were made to your prescriptions during this visit.            Wade Richey MD  08/29/23 0011

## 2023-08-30 PROBLEM — I21.4 NSTEMI, INITIAL EPISODE OF CARE: Status: ACTIVE | Noted: 2023-08-28

## 2023-08-30 PROBLEM — E43 SEVERE MALNUTRITION: Status: ACTIVE | Noted: 2023-08-30

## 2023-08-30 LAB
ALBUMIN SERPL-MCNC: 3.1 G/DL (ref 3.5–5.2)
ALBUMIN/GLOB SERPL: 0.9 G/DL
ALP SERPL-CCNC: 54 U/L (ref 39–117)
ALT SERPL W P-5'-P-CCNC: <5 U/L (ref 1–33)
ANION GAP SERPL CALCULATED.3IONS-SCNC: 8 MMOL/L (ref 5–15)
AST SERPL-CCNC: 12 U/L (ref 1–32)
BASOPHILS # BLD AUTO: 0.02 10*3/MM3 (ref 0–0.2)
BASOPHILS NFR BLD AUTO: 0.4 % (ref 0–1.5)
BILIRUB SERPL-MCNC: 0.3 MG/DL (ref 0–1.2)
BUN SERPL-MCNC: 14 MG/DL (ref 8–23)
BUN/CREAT SERPL: 15.1 (ref 7–25)
CALCIUM SPEC-SCNC: 8.5 MG/DL (ref 8.6–10.5)
CHLORIDE SERPL-SCNC: 104 MMOL/L (ref 98–107)
CO2 SERPL-SCNC: 26 MMOL/L (ref 22–29)
CREAT SERPL-MCNC: 0.93 MG/DL (ref 0.57–1)
DEPRECATED RDW RBC AUTO: 44 FL (ref 37–54)
EGFRCR SERPLBLD CKD-EPI 2021: 62.6 ML/MIN/1.73
EOSINOPHIL # BLD AUTO: 0.11 10*3/MM3 (ref 0–0.4)
EOSINOPHIL NFR BLD AUTO: 2.3 % (ref 0.3–6.2)
ERYTHROCYTE [DISTWIDTH] IN BLOOD BY AUTOMATED COUNT: 14.2 % (ref 12.3–15.4)
GLOBULIN UR ELPH-MCNC: 3.6 GM/DL
GLUCOSE SERPL-MCNC: 83 MG/DL (ref 65–99)
HCT VFR BLD AUTO: 32.5 % (ref 34–46.6)
HGB BLD-MCNC: 10.6 G/DL (ref 12–15.9)
IMM GRANULOCYTES # BLD AUTO: 0.01 10*3/MM3 (ref 0–0.05)
IMM GRANULOCYTES NFR BLD AUTO: 0.2 % (ref 0–0.5)
LYMPHOCYTES # BLD AUTO: 0.77 10*3/MM3 (ref 0.7–3.1)
LYMPHOCYTES NFR BLD AUTO: 16 % (ref 19.6–45.3)
MCH RBC QN AUTO: 28 PG (ref 26.6–33)
MCHC RBC AUTO-ENTMCNC: 32.6 G/DL (ref 31.5–35.7)
MCV RBC AUTO: 85.8 FL (ref 79–97)
MONOCYTES # BLD AUTO: 0.66 10*3/MM3 (ref 0.1–0.9)
MONOCYTES NFR BLD AUTO: 13.7 % (ref 5–12)
NEUTROPHILS NFR BLD AUTO: 3.24 10*3/MM3 (ref 1.7–7)
NEUTROPHILS NFR BLD AUTO: 67.4 % (ref 42.7–76)
NRBC BLD AUTO-RTO: 0 /100 WBC (ref 0–0.2)
PLATELET # BLD AUTO: 154 10*3/MM3 (ref 140–450)
PMV BLD AUTO: 12.3 FL (ref 6–12)
POTASSIUM SERPL-SCNC: 4.2 MMOL/L (ref 3.5–5.2)
PROT SERPL-MCNC: 6.7 G/DL (ref 6–8.5)
RBC # BLD AUTO: 3.79 10*6/MM3 (ref 3.77–5.28)
SODIUM SERPL-SCNC: 138 MMOL/L (ref 136–145)
WBC NRBC COR # BLD: 4.81 10*3/MM3 (ref 3.4–10.8)

## 2023-08-30 PROCEDURE — G0378 HOSPITAL OBSERVATION PER HR: HCPCS

## 2023-08-30 PROCEDURE — 85025 COMPLETE CBC W/AUTO DIFF WBC: CPT | Performed by: NURSE PRACTITIONER

## 2023-08-30 PROCEDURE — C1894 INTRO/SHEATH, NON-LASER: HCPCS | Performed by: INTERNAL MEDICINE

## 2023-08-30 PROCEDURE — A9270 NON-COVERED ITEM OR SERVICE: HCPCS | Performed by: INTERNAL MEDICINE

## 2023-08-30 PROCEDURE — 25010000002 NITROGLYCERIN 5 MG/ML SOLUTION: Performed by: INTERNAL MEDICINE

## 2023-08-30 PROCEDURE — A9270 NON-COVERED ITEM OR SERVICE: HCPCS | Performed by: NURSE PRACTITIONER

## 2023-08-30 PROCEDURE — 63710000001 SENNOSIDES-DOCUSATE 8.6-50 MG TABLET: Performed by: INTERNAL MEDICINE

## 2023-08-30 PROCEDURE — 93458 L HRT ARTERY/VENTRICLE ANGIO: CPT | Performed by: INTERNAL MEDICINE

## 2023-08-30 PROCEDURE — 63710000001 ASPIRIN 325 MG TABLET: Performed by: INTERNAL MEDICINE

## 2023-08-30 PROCEDURE — 25010000002 MIDAZOLAM PER 1 MG: Performed by: INTERNAL MEDICINE

## 2023-08-30 PROCEDURE — C1769 GUIDE WIRE: HCPCS | Performed by: INTERNAL MEDICINE

## 2023-08-30 PROCEDURE — 25010000002 HEPARIN (PORCINE) PER 1000 UNITS: Performed by: INTERNAL MEDICINE

## 2023-08-30 PROCEDURE — 25510000001 IOPAMIDOL PER 1 ML: Performed by: INTERNAL MEDICINE

## 2023-08-30 PROCEDURE — 80053 COMPREHEN METABOLIC PANEL: CPT | Performed by: NURSE PRACTITIONER

## 2023-08-30 PROCEDURE — 97162 PT EVAL MOD COMPLEX 30 MIN: CPT

## 2023-08-30 PROCEDURE — 63710000001 GABAPENTIN 400 MG CAPSULE: Performed by: NURSE PRACTITIONER

## 2023-08-30 PROCEDURE — 25010000002 FENTANYL CITRATE (PF) 50 MCG/ML SOLUTION: Performed by: INTERNAL MEDICINE

## 2023-08-30 PROCEDURE — 99214 OFFICE O/P EST MOD 30 MIN: CPT | Performed by: INTERNAL MEDICINE

## 2023-08-30 RX ORDER — HEPARIN SODIUM 1000 [USP'U]/ML
INJECTION, SOLUTION INTRAVENOUS; SUBCUTANEOUS
Status: DISCONTINUED | OUTPATIENT
Start: 2023-08-30 | End: 2023-08-30 | Stop reason: HOSPADM

## 2023-08-30 RX ORDER — SODIUM CHLORIDE 9 MG/ML
75 INJECTION, SOLUTION INTRAVENOUS CONTINUOUS
Status: ACTIVE | OUTPATIENT
Start: 2023-08-30 | End: 2023-08-30

## 2023-08-30 RX ORDER — ACETAMINOPHEN 325 MG/1
650 TABLET ORAL EVERY 4 HOURS PRN
Status: DISCONTINUED | OUTPATIENT
Start: 2023-08-30 | End: 2023-08-31 | Stop reason: HOSPADM

## 2023-08-30 RX ORDER — GABAPENTIN 400 MG/1
800 CAPSULE ORAL EVERY 12 HOURS
Status: DISCONTINUED | OUTPATIENT
Start: 2023-08-30 | End: 2023-08-31 | Stop reason: HOSPADM

## 2023-08-30 RX ORDER — SODIUM CHLORIDE 0.9 % (FLUSH) 0.9 %
10 SYRINGE (ML) INJECTION EVERY 12 HOURS SCHEDULED
Status: DISCONTINUED | OUTPATIENT
Start: 2023-08-30 | End: 2023-08-31 | Stop reason: HOSPADM

## 2023-08-30 RX ORDER — SODIUM NITROPRUSSIDE 25 MG/ML
INJECTION INTRAVENOUS
Status: DISCONTINUED | OUTPATIENT
Start: 2023-08-30 | End: 2023-08-30 | Stop reason: HOSPADM

## 2023-08-30 RX ORDER — LIDOCAINE HYDROCHLORIDE 20 MG/ML
INJECTION, SOLUTION INFILTRATION; PERINEURAL
Status: DISCONTINUED | OUTPATIENT
Start: 2023-08-30 | End: 2023-08-30 | Stop reason: HOSPADM

## 2023-08-30 RX ORDER — NITROGLYCERIN 5 MG/ML
INJECTION, SOLUTION INTRAVENOUS
Status: DISCONTINUED | OUTPATIENT
Start: 2023-08-30 | End: 2023-08-30 | Stop reason: HOSPADM

## 2023-08-30 RX ORDER — SODIUM CHLORIDE 0.9 % (FLUSH) 0.9 %
10 SYRINGE (ML) INJECTION AS NEEDED
Status: DISCONTINUED | OUTPATIENT
Start: 2023-08-30 | End: 2023-08-31 | Stop reason: HOSPADM

## 2023-08-30 RX ORDER — FENTANYL CITRATE 50 UG/ML
INJECTION, SOLUTION INTRAMUSCULAR; INTRAVENOUS
Status: DISCONTINUED | OUTPATIENT
Start: 2023-08-30 | End: 2023-08-30 | Stop reason: HOSPADM

## 2023-08-30 RX ORDER — ASPIRIN 325 MG
TABLET ORAL
Status: DISCONTINUED | OUTPATIENT
Start: 2023-08-30 | End: 2023-08-30 | Stop reason: HOSPADM

## 2023-08-30 RX ORDER — MIDAZOLAM HYDROCHLORIDE 1 MG/ML
INJECTION INTRAMUSCULAR; INTRAVENOUS
Status: DISCONTINUED | OUTPATIENT
Start: 2023-08-30 | End: 2023-08-30 | Stop reason: HOSPADM

## 2023-08-30 RX ADMIN — AMLODIPINE BESYLATE 2.5 MG: 2.5 TABLET ORAL at 09:08

## 2023-08-30 RX ADMIN — CETIRIZINE HYDROCHLORIDE 10 MG: 10 TABLET, FILM COATED ORAL at 09:08

## 2023-08-30 RX ADMIN — SODIUM CHLORIDE 10 ML: 9 INJECTION, SOLUTION INTRAMUSCULAR; INTRAVENOUS; SUBCUTANEOUS at 20:44

## 2023-08-30 RX ADMIN — GABAPENTIN 800 MG: 400 CAPSULE ORAL at 20:45

## 2023-08-30 RX ADMIN — DIVALPROEX SODIUM 500 MG: 500 TABLET, FILM COATED, EXTENDED RELEASE ORAL at 09:08

## 2023-08-30 RX ADMIN — DOCUSATE SODIUM 50 MG AND SENNOSIDES 8.6 MG 2 TABLET: 8.6; 5 TABLET, FILM COATED ORAL at 20:45

## 2023-08-30 RX ADMIN — POLYETHYLENE GLYCOL 3350 17 G: 17 POWDER, FOR SOLUTION ORAL at 09:08

## 2023-08-30 RX ADMIN — Medication 10 ML: at 20:45

## 2023-08-30 RX ADMIN — Medication 500 UNITS: at 09:07

## 2023-08-30 RX ADMIN — Medication 10 ML: at 09:12

## 2023-08-30 RX ADMIN — GABAPENTIN 800 MG: 400 CAPSULE ORAL at 09:08

## 2023-08-30 RX ADMIN — DOCUSATE SODIUM 50 MG AND SENNOSIDES 8.6 MG 2 TABLET: 8.6; 5 TABLET, FILM COATED ORAL at 09:08

## 2023-08-30 RX ADMIN — PANTOPRAZOLE SODIUM 40 MG: 40 TABLET, DELAYED RELEASE ORAL at 06:49

## 2023-08-30 RX ADMIN — HYDROXYCHLOROQUINE SULFATE 200 MG: 200 TABLET, FILM COATED ORAL at 09:10

## 2023-08-30 RX ADMIN — ASPIRIN 81 MG: 81 TABLET, COATED ORAL at 09:08

## 2023-08-30 NOTE — PROGRESS NOTES
"[facility] Cardiology Progress Note   LOS: 0 days   Patient Care Team:  Errol Ruby MD as PCP - General (Family Medicine)    Chief Complaint:    Chief Complaint   Patient presents with    Chest Pain    Abdominal Pain        Subjective     Interval History:   Patient Denies: no complaints today  Patient Complaints: Constipation  History taken from: patient chart    No acute events overnight.  No further episodes of chest pain. Nuclear stress test intermediate risk patient recommended for cardiac catheterization today.  Discussed with patient and daughter at bedside.    Objective     Vital Sign Min/Max for last 24 hours  Temp  Min: 97.2 °F (36.2 °C)  Max: 98.3 °F (36.8 °C)   BP  Min: 122/73  Max: 142/90   Pulse  Min: 66  Max: 76   Resp  Min: 18  Max: 18   SpO2  Min: 90 %  Max: 94 %   No data recorded   Weight  Min: 41.2 kg (90 lb 12.8 oz)  Max: 42.5 kg (93 lb 11.1 oz)     Flowsheet Rows      Flowsheet Row First Filed Value   Admission Height 162.6 cm (64\") Documented at 08/28/2023 2207   Admission Weight 42.6 kg (94 lb) Documented at 08/28/2023 2207              08/29/23  0150 08/29/23  1550 08/30/23  0300   Weight: 42.5 kg (93 lb 9.6 oz) 42.5 kg (93 lb 11.1 oz) 41.2 kg (90 lb 12.8 oz)       Physical Exam:  Physical Exam  Vitals and nursing note reviewed.   Constitutional:       General: She is not in acute distress.     Appearance: She is cachectic. She is not ill-appearing, toxic-appearing or diaphoretic.   HENT:      Head: Normocephalic.      Right Ear: External ear normal.      Left Ear: External ear normal.   Eyes:      General: Lids are normal.      Pupils: Pupils are equal, round, and reactive to light.   Neck:      Thyroid: No thyromegaly.      Vascular: No carotid bruit or JVD.      Trachea: No tracheal deviation.   Cardiovascular:      Rate and Rhythm: Normal rate and regular rhythm.      Heart sounds: Murmur heard.   Systolic murmur is present.     No friction rub. No gallop.   Pulmonary:      Effort: " Pulmonary effort is normal. No respiratory distress.      Breath sounds: Normal breath sounds. No stridor. No wheezing or rales.   Chest:      Chest wall: No tenderness.   Abdominal:      General: Bowel sounds are normal. There is no distension.      Palpations: Abdomen is soft.      Tenderness: There is no abdominal tenderness.   Musculoskeletal:      Right lower leg: No edema.      Left lower leg: No edema.   Skin:     General: Skin is warm and dry.      Findings: No erythema or rash.   Neurological:      Mental Status: She is alert and oriented to person, place, and time.      Cranial Nerves: No cranial nerve deficit.      Deep Tendon Reflexes: Reflexes are normal and symmetric. Reflexes normal.   Psychiatric:         Behavior: Behavior normal.         Thought Content: Thought content normal.         Judgment: Judgment normal.        Results Review:     Results from last 7 days   Lab Units 08/30/23  0524 08/29/23  0525 08/28/23  2226   SODIUM mmol/L 138 137 140   POTASSIUM mmol/L 4.2 3.9 3.9   CHLORIDE mmol/L 104 97* 97*   CO2 mmol/L 26.0 31.0* 29.0   BUN mg/dL 14 18 17   CREATININE mg/dL 0.93 0.95 1.21*   CALCIUM mg/dL 8.5* 9.3 10.2   BILIRUBIN mg/dL 0.3  --  0.2   ALK PHOS U/L 54  --  66   ALT (SGPT) U/L <5  --  9   AST (SGOT) U/L 12  --  23   GLUCOSE mg/dL 83 85 105*       Estimated Creatinine Clearance: 31.9 mL/min (by C-G formula based on SCr of 0.93 mg/dL).    Results from last 7 days   Lab Units 08/28/23  2226   MAGNESIUM mg/dL 2.1             Results from last 7 days   Lab Units 08/30/23  0524 08/29/23  0525 08/28/23  2226   WBC 10*3/mm3 4.81 4.42 5.15   HEMOGLOBIN g/dL 10.6* 10.4* 12.2   PLATELETS 10*3/mm3 154 134* 170           Lab Results   Component Value Date    PROBNP 455.2 08/28/2023       I/O last 3 completed shifts:  In: 510 [P.O.:510]  Out: 1450 [Urine:1450]    Cardiographics:  ECG/EMG Results (last 24 hours)       Procedure Component Value Units Date/Time    SCANNED EKG [[id number]] Resulted:  08/28/23     Updated: 08/29/23 1257    SCANNED - TELEMETRY   [[id number]] Resulted: 08/28/23     Updated: 08/29/23 1420    SCANNED - TELEMETRY   [[id number]] Resulted: 08/28/23     Updated: 08/29/23 1442    Adult Transthoracic Echo Complete w/ Color, Spectral and Contrast if Necessary Per Protocol [[id number]] Resulted: 08/29/23 1750     Updated: 08/29/23 1753     EF(MOD-bp) 71.1 %      LVIDd 3.8 cm      LVIDs 2.02 cm      IVSd 0.87 cm      LVPWd 0.97 cm      FS 46.6 %      IVS/LVPW 0.90 cm      ESV(cubed) 8.2 ml      LV Sys Vol (BSA corrected) 12.6 cm2      EDV(cubed) 53.8 ml      LV [place] Vol (BSA corrected) 39.9 cm2      LVOT area 3.2 cm2      LV mass(C)d 103.1 grams      LVOT diam 2.01 cm      EDV(MOD-sp2) 53.9 ml      EDV(MOD-sp4) 56.3 ml      ESV(MOD-sp2) 14.4 ml      ESV(MOD-sp4) 17.8 ml      SV(MOD-sp2) 39.5 ml      SV(MOD-sp4) 38.5 ml      SI(MOD-sp2) 28.0 ml/m2      SI(MOD-sp4) 27.3 ml/m2      EF(MOD-sp2) 73.3 %      EF(MOD-sp4) 68.4 %      MV E max [place] 63.0 cm/sec      MV A max [place] 107.0 cm/sec      MV dec time 0.26 msec      MV E/A 0.59     LA ESV Index (BP) 22.6 ml/m2      Med Peak E' [place] 8.1 cm/sec      Lat Peak E' [place] 10.9 cm/sec      Avg E/e' ratio 6.63     SV(LVOT) 52.4 ml      RVIDd 2.42 cm      TAPSE (>1.6) 1.99 cm      LA dimension (2D)  3.3 cm      LV V1 max 92.5 cm/sec      LV V1 max PG 3.4 mmHg      LV V1 mean PG 2.00 mmHg      LV V1 VTI 16.5 cm      Ao pk [place] 117.0 cm/sec      Ao max PG 5.5 mmHg      Ao mean PG 3.0 mmHg      Ao V2 VTI 24.3 cm      CHRISTIAN(I,D) 2.16 cm2      MV max PG 4.2 mmHg      MV mean PG 1.26 mmHg      MV V2 VTI 23.8 cm      MV P1/2t 70.7 msec      MVA(P1/2t) 3.1 cm2      MVA(VTI) 2.20 cm2      MV dec slope 264.8 cm/sec2      MR max [place] 504.9 cm/sec      MR max .0 mmHg      TR max [place] 294.5 cm/sec      TR max PG 34.7 mmHg      PA V2 max 86.9 cm/sec      Ao root diam 2.9 cm      ACS 1.90 cm      RVSP(TR) 37.7 mmHg      RAP systole 3.0 mmHg     Narrative:        Left  ventricular systolic function is normal. Left ventricular ejection   fraction appears to be 61 - 65%.    Left ventricular diastolic function is consistent with (grade Ia w/high   LAP) impaired relaxation.    Estimated right ventricular systolic pressure from tricuspid   regurgitation is mildly elevated (35-45 mmHg).            Results for orders placed during the hospital encounter of 08/28/23    Adult Transthoracic Echo Complete w/ Color, Spectral and Contrast if Necessary Per Protocol    Interpretation Summary    Left ventricular systolic function is normal. Left ventricular ejection fraction appears to be 61 - 65%.    Left ventricular diastolic function is consistent with (grade Ia w/high LAP) impaired relaxation.    Estimated right ventricular systolic pressure from tricuspid regurgitation is mildly elevated (35-45 mmHg).      Imaging Results (Most Recent)       Procedure Component Value Units Date/Time    XR Chest 1 View [[id number]] Collected: 08/28/23 2305     Updated: 08/28/23 2308    Narrative:      XR CHEST 1 VIEW    HISTORY: Chest pain protocolChest Pain Protocol    COMPARISON: 6/8/2021.    FINDINGS:  Frontal view of the chest was obtained.    The lungs are clear. Cardiac silhouette is within normal limits. There is no  significant pleural effusion or pneumothorax.    The osseous structures and surrounding soft tissues demonstrate no acute  abnormality.    Upper abdomen is unremarkable.        Impression:      1. No acute cardiopulmonary disease.                XR Chest 1 View    Result Date: 8/28/2023  1. No acute cardiopulmonary disease.      Medication Review:     Current Facility-Administered Medications:     acetaminophen (TYLENOL) tablet 650 mg, 650 mg, Oral, Q4H PRN **OR** acetaminophen (TYLENOL) 160 MG/5ML solution 650 mg, 650 mg, Oral, Q4H PRN **OR** acetaminophen (TYLENOL) suppository 650 mg, 650 mg, Rectal, Q4H PRN, Sunil Ralph MD    amLODIPine (NORVASC) tablet 2.5 mg, 2.5 mg, Oral,  Daily, Sunil Ralph MD, 2.5 mg at 08/30/23 0908    aspirin EC tablet 81 mg, 81 mg, Oral, Daily, Kaylan Bose, APRN, 81 mg at 08/30/23 0908    baclofen (LIORESAL) tablet 10 mg, 10 mg, Oral, BID PRN, Levill, Dominga G, APRN    sennosides-docusate (PERICOLACE) 8.6-50 MG per tablet 2 tablet, 2 tablet, Oral, BID, 2 tablet at 08/30/23 0908 **AND** polyethylene glycol (MIRALAX) packet 17 g, 17 g, Oral, Daily PRN, 17 g at 08/30/23 0908 **AND** bisacodyl (DULCOLAX) EC tablet 5 mg, 5 mg, Oral, Daily PRN **AND** bisacodyl (DULCOLAX) suppository 10 mg, 10 mg, Rectal, Daily PRN, Sunil Ralph MD    cetirizine (zyrTEC) tablet 10 mg, 10 mg, Oral, Daily, Sunil Ralph MD, 10 mg at 08/30/23 0908    cholecalciferol (VITAMIN D3) tablet 500 Units, 500 Units, Oral, Daily, Sunil Ralph MD, 500 Units at 08/30/23 0907    dicyclomine (BENTYL) capsule 10 mg, 10 mg, Oral, TID PRN, Sunil Ralph MD, 10 mg at 08/29/23 1854    divalproex (DEPAKOTE ER) 24 hr tablet 500 mg, 500 mg, Oral, Daily, Sunil Ralph MD, 500 mg at 08/30/23 0908    gabapentin (NEURONTIN) capsule 800 mg, 800 mg, Oral, Daily, Sunil Ralph MD, 800 mg at 08/30/23 0908    hydroxychloroquine (PLAQUENIL) tablet 200 mg, 200 mg, Oral, Daily, Levill, Dominga G, APRN, 200 mg at 08/30/23 0910    ipratropium-albuterol (DUO-NEB) nebulizer solution 3 mL, 3 mL, Nebulization, Q4H PRN, Sunil Ralph MD    LORazepam (ATIVAN) injection 0.5 mg, 0.5 mg, Intravenous, Q6H PRN, Sunil Ralph MD    meclizine (ANTIVERT) tablet 25 mg, 25 mg, Oral, TID PRN, Sunil Ralph MD    nitroglycerin (NITROSTAT) SL tablet 0.4 mg, 0.4 mg, Sublingual, Q5 Min PRN, Sunil Ralph MD    nitroglycerin (NITROSTAT) SL tablet 0.4 mg, 0.4 mg, Sublingual, Q5 Min PRN, Sunil Ralph MD    ondansetron (ZOFRAN) tablet 4 mg, 4 mg, Oral, Q6H PRN **OR** ondansetron (ZOFRAN) injection 4 mg, 4 mg, Intravenous, Q6H PRN,  Sunil Ralph MD, 4 mg at 08/29/23 0755    pantoprazole (PROTONIX) EC tablet 40 mg, 40 mg, Oral, Q AM, Sunil Ralph MD, 40 mg at 08/30/23 0649    Sodium Chloride (PF) 0.9 % 10 mL, 10 mL, Intravenous, Q12H, Kaylan Bose, APRN, 10 mL at 08/29/23 1238    sodium chloride 0.9 % flush 10 mL, 10 mL, Intravenous, PRN, Wade Richey MD    sodium chloride 0.9 % flush 10 mL, 10 mL, Intravenous, Q12H, Sunil Ralph MD, 10 mL at 08/30/23 0912    sodium chloride 0.9 % flush 10 mL, 10 mL, Intravenous, PRN, Sunil Ralph MD    sodium chloride 0.9 % flush 10 mL, 10 mL, Intravenous, Q12H, Margarita Grubbs MD    sodium chloride 0.9 % flush 10 mL, 10 mL, Intravenous, PRN, Margarita Grubbs MD    sodium chloride 0.9 % infusion 40 mL, 40 mL, Intravenous, PRN, Sunil Ralph MD    Assessment & Plan       Chest pain    Normocytic anemia    Underweight    CKD (chronic kidney disease) stage 2, GFR 60-89 ml/min    Severe malnutrition    NSTEMI, initial episode of care    #1. Chest pain: Somewhat atypical in nature with GI involvement.  She does have multiple cardiac risk factors including hypertension, tobacco use.  Mildly elevated high-sensitivity troponin.  EKG negative for acute findings. She is hemodynamically stable. Currently CP free.   -Echocardiogram showing normal LVEF, no regional wall motion abnormalities, mild MR, mild TR  -Nuclear stress test showing intermediate restudy with moderate size infarct in the lateral wall and inferior wall with moderate mercedes-infarct ischemia.  She has been recommended for cardiac catheterization per Dr. Grubbs. NPO since midnight.  -Continue Norvasc 2.5 mg,Protonix, ASA 81mg     C Pre-Op:     Invasive coronary angiography was recommended to the patient.  The patientdenies  bleeding issues. The patient reports use of antiplatelet agents.  The patient denies  CKD. The patient denies  contrast allergy. The patient denies use of diabetes  medications.        The indications, risks/benefits and alternatives of diagnostic left heart cardiac catheterization, angiography, conscious sedation, and possible blood transfusion were discussed in detail with the patient. The potential complications of 1/2000 chance of death, 1/1000 chance of heart attack or stroke, 1/500 chance of bleeding or clotting of the femoral artery, and 1/500 chance of allergic reaction to contrast were discussed. We also reviewed possible complications of infection and kidney dysfunction. If PCI were performed and intra-coronary stents indicated, we discussed the details about TIM. This included a review of the risks of the infrequent, but relatively higher incidence of late thrombosis with TIM. The importance of maintaining a consistent daily regimen of aspirin and an additional anti-platelet agent for as long as directed after implantation was emphasized. No contraindications were found. The patient  appeared to understand and agreed to the above.  -Left heart catheterization, Coronary angiography, Graft angiography, In-situ LIMA angiography, Left ventriculography, Intravascular ultrasound, Optical coherence tomography, Flow wire, Balloon Angioplasty, Coronary stent, Graft stent, Aortography, Iliofemoral angiography, Device closure, femoral artery, Intra-aortic balloon pump, Impella LV assist device implantation, Transvenous pacemaker, Pericardiocentesis and Subclavian angiography     ASA Class: II  Mallampati Score: II        Contraindications to DAPT: None    #2.  Hypertension: She was hypertensive upon arrival at 213/100.  Blood pressure now stable.   She has been on Norvasc.     Mary Anne Allison Wolf  reports that she has been smoking cigarettes. She has been smoking an average of .25 packs per day. She has never used smokeless tobacco.. I have educated her on the risk of diseases from using tobacco products such as cancer, COPD, and heart disease.      I advised her to quit and she  is not willing to quit.     I spent 3  minutes counseling the patient.     Patient's (Body mass index is 16.07 kg/m².) Low BMI.  Dietary consult to be considered per primary team    Plan for disposition: Continue 3 west. We will continue to follow.             This document has been electronically signed by MARINE Marino on August 30, 2023 12:12 CDT      Electronically signed by MARINE Marino, 08/30/23, 12:12 PM CDT.      I personally saw and examined Mary Anne Wolf after the APRN.  I personally performed a history and physical examination of the patient.  I personally reviewed independent findings and plan of care.  I discussed management with the APRN.  I agree with the APRN's documentation.    78-year-old female with history of lupus, GERD and seizure presented with symptoms of shortness of breath nausea epigastric pain. Was noted to have mildly elevated troponins were consulted for further evaluation.     Subjective: No acute issues overnight.  Chest pain-free.    Vitals:    08/30/23 0300 08/30/23 0657 08/30/23 0723 08/30/23 1155   BP: 124/65 122/73  126/66   BP Location: Left arm Left arm  Left arm   Patient Position: Lying Lying  Lying   Pulse: 70 70 67 72   Resp: 18 18  18   Temp: 98 °F (36.7 °C) 97.3 °F (36.3 °C)  97.2 °F (36.2 °C)   TempSrc: Temporal Temporal  Temporal   SpO2: 90% 90%  94%   Weight: 41.2 kg (90 lb 12.8 oz)      Height:         1.  Chest pain:   Mixed features on presentation..  Troponin was mildly elevated.  Blood pressure was significantly elevated on arrival but EKG without significant ischemic changes.  Echocardiac without significant wall motion normalities.    Patient underwent nuclear stress test for further restratification which showed infarction.  For ischemia in the inferior wall.  Patient has been approved medical therapy.  We will plan on cardiac cath for further evaluation for coronary artery disease.  Risks and benefits explained as above.     Thank you  for the consult continue to follow.          Electronically signed by Margarita Grubbs MD, 08/30/23, 1:23 PM CDT.

## 2023-08-30 NOTE — PROGRESS NOTES
[facility] Medicine Services  INPATIENT PROGRESS NOTE    Length of Stay: 0  Date of Admission: 8/28/2023  Primary Care Physician: Errol Ruby MD    Subjective   Chief Complaint: Nausea    HPI:  This is a 78 year old female with PMH of arthritis, lupus, diverticular disease, GERD and seizures that presented to [facility] on 8/28/2023 with complaints of nausea, shortness of air and chest pain.      Patient was noted to have an elevated troponin of 59.  Cardiology was consulted and the patient underwent a stress test that indicated intermediate risk.  The patient is scheduled to undergo cardiac cath today.     Review of Systems   Constitutional:  Negative for activity change and fatigue.   HENT:  Negative for ear pain and sore throat.    Eyes:  Negative for pain and discharge.   Respiratory:  Positive for shortness of breath. Negative for cough.    Cardiovascular:  Positive for chest pain. Negative for palpitations.   Gastrointestinal:  Positive for nausea. Negative for abdominal pain.   Endocrine: Negative for cold intolerance and heat intolerance.   Genitourinary:  Negative for difficulty urinating and dysuria.   Musculoskeletal:  Negative for arthralgias and gait problem.   Skin:  Negative for color change and rash.   Neurological:  Negative for dizziness and weakness.   Psychiatric/Behavioral:  Negative for agitation and confusion.         Objective    Temp:  [97.2 °F (36.2 °C)-98.3 °F (36.8 °C)] 97.2 °F (36.2 °C)  Heart Rate:  [66-76] 72  Resp:  [18] 18  BP: (122-142)/(65-90) 126/66    Physical Exam  Constitutional:       Appearance: She is well-developed.   HENT:      Head: Normocephalic and atraumatic.   Eyes:      Pupils: Pupils are equal, round, and reactive to light.   Cardiovascular:      Rate and Rhythm: Normal rate and regular rhythm.   Pulmonary:      Effort: Pulmonary effort is normal.      Breath sounds: Normal breath sounds.   Abdominal:      General: Bowel sounds are normal.      Palpations:  Abdomen is soft.   Musculoskeletal:         General: Normal range of motion.      Cervical back: Normal range of motion and neck supple.   Skin:     General: Skin is warm and dry.   Neurological:      Mental Status: She is alert and oriented to person, place, and time.   Psychiatric:         Behavior: Behavior normal.       Results Review:  I have reviewed the labs, radiology results, and diagnostic studies.    Laboratory Data:   Results from last 7 days   Lab Units 08/30/23  0524 08/29/23  0525 08/28/23  2226   SODIUM mmol/L 138 137 140   POTASSIUM mmol/L 4.2 3.9 3.9   CHLORIDE mmol/L 104 97* 97*   CO2 mmol/L 26.0 31.0* 29.0   BUN mg/dL 14 18 17   CREATININE mg/dL 0.93 0.95 1.21*   GLUCOSE mg/dL 83 85 105*   CALCIUM mg/dL 8.5* 9.3 10.2   BILIRUBIN mg/dL 0.3  --  0.2   ALK PHOS U/L 54  --  66   ALT (SGPT) U/L <5  --  9   AST (SGOT) U/L 12  --  23   ANION GAP mmol/L 8.0 9.0 14.0       Estimated Creatinine Clearance: 31.9 mL/min (by C-G formula based on SCr of 0.93 mg/dL).  Results from last 7 days   Lab Units 08/28/23  2226   MAGNESIUM mg/dL 2.1           Results from last 7 days   Lab Units 08/30/23  0524 08/29/23  0525 08/28/23  2226   WBC 10*3/mm3 4.81 4.42 5.15   HEMOGLOBIN g/dL 10.6* 10.4* 12.2   HEMATOCRIT % 32.5* 30.1* 37.7   PLATELETS 10*3/mm3 154 134* 170             Culture Data:   No results found for: BLOODCX  No results found for: URINECX  No results found for: RESPCX  No results found for: WOUNDCX  No results found for: STOOLCX  No components found for: BODYFLD    Radiology Data:   Imaging Results (Last 24 Hours)       ** No results found for the last 24 hours. **            I have reviewed the patient's current medications.     Assessment/Plan     Active Hospital Problems    Diagnosis     **Chest pain     Severe malnutrition     Normocytic anemia     Underweight     CKD (chronic kidney disease) stage 2, GFR 60-89 ml/min     NSTEMI, initial episode of care        Plan:     Chest pain, rule out ACS:   Serial cardiac enzymes, EKG and continuous telemetry. Heart cath pending. Cardiology consult appreciated.   Normocytic anemia/ iron deficiency:  Iron started.   History of seizures:  Continue home Depakote.   Hypertension:  Continue home amlodipine.  Lupus:  Continue home hydroxychloroquine.   GERD:  Continue home PPI.     VTE PPX:  Lovenox      Medical Decision Making  Number and Complexity of problems: 6/High  Differential Diagnosis: GERD    Conditions and Status:        Condition is improving.     [facility] Data  External documents reviewed: Yes  My EKG interpretation: PVCs   My plain film interpretation: As per radiology   Tests considered but not ordered: None     Decision rules/scores evaluated (example [id number], [place], etc): Heart     Discussed with: Patient     Treatment Plan  As above    Care Planning  Shared decision making: None  Code status and discussions: Full    Disposition  Social Determinants of Health that impact treatment or disposition: None  I expect the patient to be discharged to home in 1-3 days.     I confirmed that the patient's Advance Care Plan is present, code status is documented, or surrogate decision maker is listed in the patient's medical record.      I have utilized all available immediate resources to obtain, update, or review the patient's current medications.         This document has been electronically signed by MARINE Hamilton on August 30, 2023 12:17 CDT

## 2023-08-30 NOTE — PLAN OF CARE
Goal Outcome Evaluation: Heart cath today. Right radial TR Band. Medically manage. Possible discharge tomorrow.

## 2023-08-30 NOTE — THERAPY EVALUATION
Patient Name: Mary Anne Wolf  : 1945    MRN: [id number]                              Today's Date: 2023       Admit Date: 2023    Visit Dx:     ICD-10-CM ICD-9-CM   1. Precordial pain  R07.2 786.51   2. Impaired functional mobility, balance, gait, and endurance [Z74.09 (ICD-10-CM)]  Z74.09 V49.89     Patient Active Problem List   Diagnosis    Essential hypertension    Neuropathy    Vertigo    Systemic lupus erythematosus    Borderline glaucoma    Nuclear cataract    Long-term use of hydroxychloroquine    Osteoporosis, unspecified    Tobacco use    Gastroesophageal reflux disease    Dysphagia    Esophageal dysmotilities    Migraine syndrome    Immunocompromised    Medically complex patient    Chest pain    Normocytic anemia    Underweight    CKD (chronic kidney disease) stage 2, GFR 60-89 ml/min     Past Medical History:   Diagnosis Date    Acute posthemorrhagic anemia     Arthritis     Backache     LLE radiculopathy    Cigarette smoker     Discoid lupus erythematosus     Diverticular disease of colon     incomplete colonoscopy to Hepatic flexure.  Rec ACBE, pt. considering    Dizziness and giddiness     Dysphagia     Esophagitis     grade III    Gastroesophageal reflux disease     rec pt try RX prilosec    H/O screening mammography     Localization-related (focal) (partial) symptomatic epilepsy and epileptic syndromes with complex partial seizures, intractable, without status epilepticus     Neuropathy     Neuropathy     Nicotine dependence     Osteopenia     Periumbilical pain     Seizure disorder     Seizure disorder     history of complex partial seizure    Stricture of esophagus     Systemic lupus erythematosus     Vertigo     Vertigo      Past Surgical History:   Procedure Laterality Date    CATARACT EXTRACTION W/ INTRAOCULAR LENS IMPLANT Right 2021    Procedure: REMOVE CATARACT AND IMPLANT INTRAOCULAR LENS;  Surgeon: Trenton Peres MD;  Location: [facility];  Service:  Ophthalmology;  Laterality: Right;    CATARACT EXTRACTION W/ INTRAOCULAR LENS IMPLANT Left 4/30/2021    Procedure: REMOVE CATARACT AND IMPLANT  INTRAOCULAR LENS;  Surgeon: Trenton Peres MD;  Location: [facility] OR;  Service: Ophthalmology;  Laterality: Left;    COLONOSCOPY  08/05/2015    COLONOSCOPY  07/08/2014    REFUSED BY PATIENT    COLONOSCOPY  08/05/2015    a diverticulum was found in the sigmoid colon    ENDOSCOPY  08/05/2015    Esophageal stricture was present. Dilatatin was performed. Esophagitis . Biopsy taken. Normal stomach. Normal duodenum    ENDOSCOPY N/A 12/6/2018    Procedure: ESOPHAGOGASTRODUODENOSCOPY;  Surgeon: Cory Bliss DO;  Location: [facility] ENDOSCOPY;  Service: Gastroenterology    UPPER GASTROINTESTINAL ENDOSCOPY  08/05/2015      General Information       Row Name 08/30/23 0819          Physical Therapy Time and Intention    Document Type evaluation  -MM     Mode of Treatment physical therapy  -MM       Row Name 08/30/23 0819          General Information    Patient Profile Reviewed yes  -MM     Prior Level of Function independent:;all household mobility;gait  -MM     Existing Precautions/Restrictions fall  -MM       Row Name 08/30/23 0819          Living Environment    People in Home spouse  -MM       Row Name 08/30/23 0819          Home Main Entrance    Number of Stairs, Main Entrance three  -MM     Stair Railings, Main Entrance railings on both sides of stairs  -MM       Row Name 08/30/23 0819          Stairs Within Home, Primary    Stairs, Within Home, Primary Patient previously ambulated with SPC in R hand, also has rollator available. Daughter reports that patient does not use SPC as is needed. Patient has tub/shower at home with seat.  -MM     Number of Stairs, Within Home, Primary none  -MM       Row Name 08/30/23 0819          Cognition    Orientation Status (Cognition) oriented x 4  -MM       Row Name 08/30/23 0819          Safety Issues, Functional Mobility     Impairments Affecting Function (Mobility) endurance/activity tolerance;shortness of breath  -MM               User Key  (r) = Recorded By, (t) = Taken By, (c) = Cosigned By      Initials Name Provider Type    Ana María Craig PT Physical Therapist                   Mobility       Row Name 08/30/23 0819          Bed Mobility    Bed Mobility supine-sit  -MM     Supine-Sit [place] (Bed Mobility) modified independence  -MM     Assistive Device (Bed Mobility) bed rails;head of bed elevated  -MM       Row Name 08/30/23 0819          Bed-Chair Transfer    Bed-Chair [place] (Transfers) contact guard  -MM     Assistive Device (Bed-Chair Transfers) walker, front-wheeled  -MM     Comment, (Bed-Chair Transfer) Patient does not use FWW safely, requires max verbal and tactile cueing to correct.  -MM       Row Name 08/30/23 0819          Sit-Stand Transfer    Sit-Stand [place] (Transfers) supervision  -MM     Assistive Device (Sit-Stand Transfers) walker, front-wheeled  -MM       Row Name 08/30/23 0819          Gait/Stairs (Locomotion)    [place] Level (Gait) contact guard  -MM     Assistive Device (Gait) walker, front-wheeled  -MM     Distance in Feet (Gait) 150' X 1  -MM     Comment, (Gait/Stairs) Patient ambulates with FWW too far ahead of her and is currently unsafe with turns. Patient gait speed decreased.  -MM               User Key  (r) = Recorded By, (t) = Taken By, (c) = Cosigned By      Initials Name Provider Type    Ana María Craig PT Physical Therapist                   Obj/Interventions       Row Name 08/30/23 0819          Range of Motion Comprehensive    General Range of Motion bilateral lower extremity ROM WFL  -MM       Row Name 08/30/23 0819          Strength Comprehensive (MMT)    General Manual Muscle Testing (MMT) Assessment other (see comments)  -MM     Comment, General Manual Muscle Testing (MMT) Assessment Bilateral hip flexion and abduction 3+/5, all other BLE 4/5.  -MM        Row Name 08/30/23 0819          Sensory Assessment (Somatosensory)    Sensory Assessment (Somatosensory) LE sensation intact  -MM               User Key  (r) = Recorded By, (t) = Taken By, (c) = Cosigned By      Initials Name Provider Type    Ana Mraía Craig, PT Physical Therapist                   Goals/Plan       Row Name 08/30/23 0819          Bed Mobility Goal 1 (PT)    Activity/Assistive Device (Bed Mobility Goal 1, PT) sit to supine/supine to sit  -MM     [place] Level/Cues Needed (Bed Mobility Goal 1, PT) independent  -MM     Time Frame (Bed Mobility Goal 1, PT) by discharge  -MM     Strategies/Barriers (Bed Mobility Goal 1, PT) HOB flat, no bed rails.  -MM     Progress/Outcomes (Bed Mobility Goal 1, PT) goal not met  -MM       Row Name 08/30/23 0819          Transfer Goal 1 (PT)    Activity/Assistive Device (Transfer Goal 1, PT) sit-to-stand/stand-to-sit;bed-to-chair/chair-to-bed;walker, rolling;cane, straight  -MM     [place] Level/Cues Needed (Transfer Goal 1, PT) modified independence  -MM     Time Frame (Transfer Goal 1, PT) by discharge  -MM     Progress/Outcome (Transfer Goal 1, PT) goal not met  -MM       Row Name 08/30/23 0819          Gait Training Goal 1 (PT)    Activity/Assistive Device (Gait Training Goal 1, PT) cane, quad;walker, rolling  -MM     [place] Level (Gait Training Goal 1, PT) modified independence  -MM     Distance (Gait Training Goal 1, PT) 150' X 1  -MM     Time Frame (Gait Training Goal 1, PT) by discharge  -MM     Progress/Outcome (Gait Training Goal 1, PT) goal not met  -MM       Row Name 08/30/23 0819          Stairs Goal 1 (PT)    Activity/Assistive Device (Stairs Goal 1, PT) using handrail, left;using handrail, right  -MM     [place] Level/Cues Needed (Stairs Goal 1, PT) modified independence  -MM     Number of Stairs (Stairs Goal 1, PT) 3  -MM     Time Frame (Stairs Goal 1, PT) by discharge  -MM     Progress/Outcome (Stairs Goal 1, PT) goal  not met  -MM       Row Name 08/30/23 0819          Problem Specific Goal 1 (PT)    Problem Specific Goal 1 (PT) Patient to score 24/28 on Tinetti fall risk assessment to decrease fall risk.  -MM     Time Frame (Problem Specific Goal 1, PT) by discharge  -MM     Progress/Outcome (Problem Specific Goal 1, PT) goal not met  -MM       Row Name 08/30/23 0819          Therapy Assessment/Plan (PT)    Planned Therapy Interventions (PT) balance training;bed mobility training;gait training;home exercise program;joint mobilization;manual therapy techniques;lumbar stabilization;motor coordination training;neuromuscular re-education;transfer training;stretching;strengthening;stair training;ROM (range of motion);postural re-education;patient/family education;vestibular therapy  -MM               User Key  (r) = Recorded By, (t) = Taken By, (c) = Cosigned By      Initials Name Provider Type    MM Ana María Otoole, PT Physical Therapist                   Clinical Impression       Row Name 08/30/23 0819          Pain    Pretreatment Pain Rating 0/10 - no pain  -MM     Posttreatment Pain Rating 0/10 - no pain  -MM       Row Name 08/30/23 0819          Plan of Care Review    Plan of Care Reviewed With patient;spouse;daughter  -MM     Outcome Evaluation PT evaluation completed. Patient AO X 4 and agreeable to therapy. Patient able to supine>sit with Mod I, sit>stand with SPV, and bed>chair transfer with FWW and CGA. Patient able to ambulate 150' X 1 with FWW and CGA. Patient requires max verbal cueing for proper use of FWW. Goals created, continue skilled IP PT. Recommend patient d/c home with assist.  -MM       Row Name 08/30/23 0819          Therapy Assessment/Plan (PT)    Rehab Potential (PT) fair, will monitor progress closely  -MM     Criteria for Skilled Interventions Met (PT) yes;skilled treatment is necessary  -MM     Therapy Frequency (PT) 5 times/wk  -MM       Row Name 08/30/23 0819          Vital Signs    Pre Systolic BP  Rehab 142  -MM     Pre Treatment Diastolic BP 76  -MM     Post Systolic BP Rehab 121  -MM     Post Treatment Diastolic BP 72  -MM     Pretreatment Heart Rate (beats/min) 72  -MM     Posttreatment Heart Rate (beats/min) 81  -MM     Pre SpO2 (%) 94  -MM     O2 Delivery Pre Treatment room air  -MM     Post SpO2 (%) 97  -MM     O2 Delivery Post Treatment room air  -MM     Pre Patient Position Supine  -MM     Post Patient Position Sitting  -MM       Row Name 08/30/23 0819          Positioning and Restraints    Pre-Treatment Position in bed  -MM     Post Treatment Position chair  -MM     In Chair exit alarm on;encouraged to call for assist;call light within reach  -MM               User Key  (r) = Recorded By, (t) = Taken By, (c) = Cosigned By      Initials Name Provider Type    Ana María Craig, PT Physical Therapist                   Outcome Measures       Row Name 08/30/23 0830 08/30/23 0819       How much help from another person do you currently need...    Turning from your back to your side while in flat bed without using bedrails? 4  -AW 4  -MM    Moving from lying on back to sitting on the side of a flat bed without bedrails? 4  -AW 4  -MM    Moving to and from a bed to a chair (including a wheelchair)? 3  -AW 3  -MM    Standing up from a chair using your arms (e.g., wheelchair, bedside chair)? 3  -AW 3  -MM    Climbing 3-5 steps with a railing? 3  -AW 3  -MM    To walk in hospital room? 3  -AW 3  -MM    AM-PAC 6 Clicks Score (PT) 20  -AW 20  -MM    Highest level of mobility 6 --> Walked 10 steps or more  -AW 6 --> Walked 10 steps or more  -MM      Row Name 08/30/23 0819          Functional Assessment    Outcome Measure Options AM-PAC 6 Clicks Basic Mobility (PT)  -MM               User Key  (r) = Recorded By, (t) = Taken By, (c) = Cosigned By      Initials Name Provider Type    Dinah Sanchez, RN Registered Nurse    Ana María Craig, PT Physical Therapist                                  Physical Therapy Education       Title: PT OT SLP Therapies (In Progress)       Topic: Physical Therapy (In Progress)       Point: Mobility training (In Progress)       Learning Progress Summary             Patient Acceptance, E, NR by ROBERT at 8/30/2023 0916    Comment: PT POC and goals, proper hand placement to facilitate transfers, use of FWW to increase safety with ambulation.                         Point: Home exercise program (Not Started)       Learner Progress:  Not documented in this visit.              Point: Body mechanics (Not Started)       Learner Progress:  Not documented in this visit.              Point: Precautions (Not Started)       Learner Progress:  Not documented in this visit.                              User Key       Initials Effective Dates Name Provider Type Discipline    ROBERT 06/26/23 -  Ana María Otoole, PT Physical Therapist PT                  PT Recommendation and Plan  Planned Therapy Interventions (PT): balance training, bed mobility training, gait training, home exercise program, joint mobilization, manual therapy techniques, lumbar stabilization, motor coordination training, neuromuscular re-education, transfer training, stretching, strengthening, stair training, ROM (range of motion), postural re-education, patient/family education, vestibular therapy  Plan of Care Reviewed With: patient, spouse, daughter  Outcome Evaluation: PT evaluation completed. Patient AO X 4 and agreeable to therapy. Patient able to supine>sit with Mod I, sit>stand with SPV, and bed>chair transfer with FWW and CGA. Patient able to ambulate 150' X 1 with FWW and CGA. Patient requires max verbal cueing for proper use of FWW. Goals created, continue skilled IP PT. Recommend patient d/c home with assist.     Time Calculation:   PT Evaluation Complexity  History, PT Evaluation Complexity: 3 or more personal factors and/or comorbidities  Examination of Body Systems (PT Eval Complexity): total of 3 or more  elements  Clinical Presentation (PT Evaluation Complexity): evolving  Clinical Decision Making (PT Evaluation Complexity): moderate complexity  Overall Complexity (PT Evaluation Complexity): moderate complexity     PT Charges       Row Name 08/30/23 0918             Time Calculation    Start Time 0819  -MM      Stop Time 0902  -MM      Time Calculation (min) 43 min  -MM      PT Received On 08/30/23  -MM      PT Goal Re-Cert Due Date 09/12/23  -MM         Untimed Charges    PT Eval/Re-eval Minutes 43  -MM         Total Minutes    Untimed Charges Total Minutes 43  -MM       Total Minutes 43  -MM                User Key  (r) = Recorded By, (t) = Taken By, (c) = Cosigned By      Initials Name Provider Type    MM Ana María Otoole, PT Physical Therapist                  Therapy Charges for Today       Code Description Service Date Service Provider Modifiers Qty    [id number] HC PT EVAL MOD COMPLEXITY 3 8/30/2023 Ana María Otoole, PT GP 1            PT G-Codes  Outcome Measure Options: AM-PAC 6 Clicks Basic Mobility (PT)  AM-PAC 6 Clicks Score (PT): 20  PT Discharge Summary  Anticipated Discharge Disposition (PT): home with assist    Ana María Otoole, PT  8/30/2023

## 2023-08-30 NOTE — BRIEF OP NOTE
Cardiac cath showed mild non-obst disease.   Normal left-sided filling pressures    TR band per protocol  Medical management

## 2023-08-30 NOTE — PLAN OF CARE
Goal Outcome Evaluation:  Plan of Care Reviewed With: patient, spouse, daughter           Outcome Evaluation: PT evaluation completed. Patient AO X 4 and agreeable to therapy. Patient able to supine>sit with Mod I, sit>stand with SPV, and bed>chair transfer with FWW and CGA. Patient able to ambulate 150' X 1 with FWW and CGA. Patient requires max verbal cueing for proper use of FWW. Goals created, continue skilled IP PT. Recommend patient d/c home with assist.      Anticipated Discharge Disposition (PT): home with assist

## 2023-08-31 ENCOUNTER — READMISSION MANAGEMENT (OUTPATIENT)
Dept: CALL CENTER | Facility: HOSPITAL | Age: 78
End: 2023-08-31
Payer: MEDICARE

## 2023-08-31 VITALS
BODY MASS INDEX: 14.78 KG/M2 | SYSTOLIC BLOOD PRESSURE: 127 MMHG | RESPIRATION RATE: 18 BRPM | HEIGHT: 64 IN | WEIGHT: 86.6 LBS | TEMPERATURE: 98.2 F | OXYGEN SATURATION: 94 % | HEART RATE: 76 BPM | DIASTOLIC BLOOD PRESSURE: 63 MMHG

## 2023-08-31 LAB
ALBUMIN SERPL-MCNC: 3.1 G/DL (ref 3.5–5.2)
ALBUMIN/GLOB SERPL: 0.9 G/DL
ALP SERPL-CCNC: 51 U/L (ref 39–117)
ALT SERPL W P-5'-P-CCNC: 5 U/L (ref 1–33)
ANION GAP SERPL CALCULATED.3IONS-SCNC: 9 MMOL/L (ref 5–15)
AST SERPL-CCNC: 17 U/L (ref 1–32)
BASOPHILS # BLD AUTO: 0.03 10*3/MM3 (ref 0–0.2)
BASOPHILS NFR BLD AUTO: 0.6 % (ref 0–1.5)
BILIRUB SERPL-MCNC: 0.3 MG/DL (ref 0–1.2)
BUN SERPL-MCNC: 16 MG/DL (ref 8–23)
BUN/CREAT SERPL: 17.6 (ref 7–25)
BURR CELLS BLD QL SMEAR: NORMAL
CALCIUM SPEC-SCNC: 8.6 MG/DL (ref 8.6–10.5)
CHLORIDE SERPL-SCNC: 102 MMOL/L (ref 98–107)
CO2 SERPL-SCNC: 25 MMOL/L (ref 22–29)
CREAT SERPL-MCNC: 0.91 MG/DL (ref 0.57–1)
DEPRECATED RDW RBC AUTO: 44.4 FL (ref 37–54)
EGFRCR SERPLBLD CKD-EPI 2021: 64.3 ML/MIN/1.73
EOSINOPHIL # BLD AUTO: 0.13 10*3/MM3 (ref 0–0.4)
EOSINOPHIL NFR BLD AUTO: 2.7 % (ref 0.3–6.2)
ERYTHROCYTE [DISTWIDTH] IN BLOOD BY AUTOMATED COUNT: 14.4 % (ref 12.3–15.4)
GLOBULIN UR ELPH-MCNC: 3.5 GM/DL
GLUCOSE SERPL-MCNC: 75 MG/DL (ref 65–99)
HCT VFR BLD AUTO: 34 % (ref 34–46.6)
HGB BLD-MCNC: 11 G/DL (ref 12–15.9)
IMM GRANULOCYTES # BLD AUTO: 0.05 10*3/MM3 (ref 0–0.05)
IMM GRANULOCYTES NFR BLD AUTO: 1 % (ref 0–0.5)
LARGE PLATELETS: NORMAL
LYMPHOCYTES # BLD AUTO: 0.78 10*3/MM3 (ref 0.7–3.1)
LYMPHOCYTES NFR BLD AUTO: 16.1 % (ref 19.6–45.3)
MCH RBC QN AUTO: 27.8 PG (ref 26.6–33)
MCHC RBC AUTO-ENTMCNC: 32.4 G/DL (ref 31.5–35.7)
MCV RBC AUTO: 85.9 FL (ref 79–97)
MONOCYTES # BLD AUTO: 0.67 10*3/MM3 (ref 0.1–0.9)
MONOCYTES NFR BLD AUTO: 13.8 % (ref 5–12)
NEUTROPHILS NFR BLD AUTO: 3.19 10*3/MM3 (ref 1.7–7)
NEUTROPHILS NFR BLD AUTO: 65.8 % (ref 42.7–76)
NRBC BLD AUTO-RTO: 0 /100 WBC (ref 0–0.2)
PLATELET # BLD AUTO: 97 10*3/MM3 (ref 140–450)
PMV BLD AUTO: ABNORMAL FL
POTASSIUM SERPL-SCNC: 4.6 MMOL/L (ref 3.5–5.2)
PROT SERPL-MCNC: 6.6 G/DL (ref 6–8.5)
QT INTERVAL: 426 MS
QTC INTERVAL: 443 MS
RBC # BLD AUTO: 3.96 10*6/MM3 (ref 3.77–5.28)
SMALL PLATELETS BLD QL SMEAR: NORMAL
SODIUM SERPL-SCNC: 136 MMOL/L (ref 136–145)
WBC MORPH BLD: NORMAL
WBC NRBC COR # BLD: 4.85 10*3/MM3 (ref 3.4–10.8)

## 2023-08-31 PROCEDURE — A9270 NON-COVERED ITEM OR SERVICE: HCPCS | Performed by: INTERNAL MEDICINE

## 2023-08-31 PROCEDURE — 80053 COMPREHEN METABOLIC PANEL: CPT | Performed by: INTERNAL MEDICINE

## 2023-08-31 PROCEDURE — 63710000001 AMLODIPINE 2.5 MG TABLET: Performed by: INTERNAL MEDICINE

## 2023-08-31 PROCEDURE — 63710000001 DIVALPROEX 500 MG TABLET SUSTAINED-RELEASE 24 HOUR: Performed by: INTERNAL MEDICINE

## 2023-08-31 PROCEDURE — 63710000001 PANTOPRAZOLE 40 MG TABLET DELAYED-RELEASE: Performed by: INTERNAL MEDICINE

## 2023-08-31 PROCEDURE — 85025 COMPLETE CBC W/AUTO DIFF WBC: CPT | Performed by: INTERNAL MEDICINE

## 2023-08-31 PROCEDURE — 63710000001 DICYCLOMINE 10 MG CAPSULE: Performed by: INTERNAL MEDICINE

## 2023-08-31 PROCEDURE — 99214 OFFICE O/P EST MOD 30 MIN: CPT | Performed by: INTERNAL MEDICINE

## 2023-08-31 PROCEDURE — 85007 BL SMEAR W/DIFF WBC COUNT: CPT | Performed by: INTERNAL MEDICINE

## 2023-08-31 PROCEDURE — G0378 HOSPITAL OBSERVATION PER HR: HCPCS

## 2023-08-31 PROCEDURE — 63710000001 ASPIRIN 81 MG TABLET DELAYED-RELEASE: Performed by: INTERNAL MEDICINE

## 2023-08-31 PROCEDURE — 63710000001 CETIRIZINE 10 MG TABLET: Performed by: INTERNAL MEDICINE

## 2023-08-31 PROCEDURE — 63710000001 SENNOSIDES-DOCUSATE 8.6-50 MG TABLET: Performed by: INTERNAL MEDICINE

## 2023-08-31 PROCEDURE — 93005 ELECTROCARDIOGRAM TRACING: CPT | Performed by: INTERNAL MEDICINE

## 2023-08-31 PROCEDURE — 63710000001 GABAPENTIN 400 MG CAPSULE: Performed by: NURSE PRACTITIONER

## 2023-08-31 PROCEDURE — A9270 NON-COVERED ITEM OR SERVICE: HCPCS | Performed by: NURSE PRACTITIONER

## 2023-08-31 PROCEDURE — 63710000001 HYDROXYCHLOROQUINE 200 MG TABLET: Performed by: INTERNAL MEDICINE

## 2023-08-31 PROCEDURE — 63710000001 CHOLECALCIFEROL 25 MCG (1000 UT) TABLET: Performed by: INTERNAL MEDICINE

## 2023-08-31 RX ORDER — ASPIRIN 81 MG/1
81 TABLET ORAL DAILY
Qty: 30 TABLET | Refills: 0 | Status: SHIPPED | OUTPATIENT
Start: 2023-09-01 | End: 2023-10-01

## 2023-08-31 RX ADMIN — GABAPENTIN 800 MG: 400 CAPSULE ORAL at 08:53

## 2023-08-31 RX ADMIN — Medication 500 UNITS: at 08:54

## 2023-08-31 RX ADMIN — Medication 10 ML: at 08:58

## 2023-08-31 RX ADMIN — DOCUSATE SODIUM 50 MG AND SENNOSIDES 8.6 MG 2 TABLET: 8.6; 5 TABLET, FILM COATED ORAL at 08:53

## 2023-08-31 RX ADMIN — DICYCLOMINE HYDROCHLORIDE 10 MG: 10 CAPSULE ORAL at 00:35

## 2023-08-31 RX ADMIN — AMLODIPINE BESYLATE 2.5 MG: 2.5 TABLET ORAL at 08:53

## 2023-08-31 RX ADMIN — DIVALPROEX SODIUM 500 MG: 500 TABLET, FILM COATED, EXTENDED RELEASE ORAL at 08:53

## 2023-08-31 RX ADMIN — HYDROXYCHLOROQUINE SULFATE 200 MG: 200 TABLET, FILM COATED ORAL at 08:56

## 2023-08-31 RX ADMIN — ASPIRIN 81 MG: 81 TABLET, COATED ORAL at 08:53

## 2023-08-31 RX ADMIN — SODIUM CHLORIDE 10 ML: 9 INJECTION, SOLUTION INTRAMUSCULAR; INTRAVENOUS; SUBCUTANEOUS at 08:58

## 2023-08-31 RX ADMIN — PANTOPRAZOLE SODIUM 40 MG: 40 TABLET, DELAYED RELEASE ORAL at 05:41

## 2023-08-31 RX ADMIN — CETIRIZINE HYDROCHLORIDE 10 MG: 10 TABLET, FILM COATED ORAL at 08:53

## 2023-08-31 NOTE — DISCHARGE SUMMARY
[facility] Medicine Services  DISCHARGE SUMMARY       Date of Admission: 8/28/2023  Date of Discharge:  8/31/2023  Primary Care Physician: Errol Ruby MD    Presenting Problem/History of Present Illness:  Precordial pain [R07.2]  Chest pain [R07.9]       Final Discharge Diagnoses:  Active Hospital Problems    Diagnosis     **Chest pain     Severe malnutrition     Normocytic anemia     Underweight     CKD (chronic kidney disease) stage 2, GFR 60-89 ml/min     NSTEMI, initial episode of care        Consults:   Consults       Date and Time Order Name Status Description    8/29/2023  7:49 AM Inpatient Cardiology Consult Completed             Procedures Performed: Procedure(s):  Left Heart Cath                Pertinent Test Results:   Lab Results (most recent)       Procedure Component Value Units Date/Time    CBC & Differential [[id number]]  (Abnormal) Collected: 08/31/23 0257    Specimen: Blood Updated: 08/31/23 0544    Narrative:      The following orders were created for panel order CBC & Differential.  Procedure                               Abnormality         Status                     ---------                               -----------         ------                     CBC Auto Differential[[id number]]        Abnormal            Final result               Scan Slide[[id number]]                                       Final result                 Please view results for these tests on the individual orders.    Scan Slide [[id number]] Collected: 08/31/23 0257    Specimen: Blood Updated: 08/31/23 0544     Crenated RBC's Mod/2+     WBC Morphology Normal     Platelet Estimate Decreased     Large Platelets Slight/1+    CBC Auto Differential [[id number]]  (Abnormal) Collected: 08/31/23 0257    Specimen: Blood Updated: 08/31/23 0450     WBC 4.85 10*3/mm3      RBC 3.96 10*6/mm3      Hemoglobin 11.0 g/dL      Hematocrit 34.0 %      MCV 85.9 fL      MCH 27.8 pg      MCHC 32.4  g/dL      RDW 14.4 %      RDW-SD 44.4 fl      MPV --     Comment: Unable to Calculate        Platelets 97 10*3/mm3      Neutrophil % 65.8 %      Lymphocyte % 16.1 %      Monocyte % 13.8 %      Eosinophil % 2.7 %      Basophil % 0.6 %      Immature Grans % 1.0 %      Neutrophils, Absolute 3.19 10*3/mm3      Lymphocytes, Absolute 0.78 10*3/mm3      Monocytes, Absolute 0.67 10*3/mm3      Eosinophils, Absolute 0.13 10*3/mm3      Basophils, Absolute 0.03 10*3/mm3      Immature Grans, Absolute 0.05 10*3/mm3      nRBC 0.0 /100 WBC     Comprehensive Metabolic Panel [[id number]]  (Abnormal) Collected: 08/31/23 0257    Specimen: Blood Updated: 08/31/23 0333     Glucose 75 mg/dL      BUN 16 mg/dL      Creatinine 0.91 mg/dL      Sodium 136 mmol/L      Potassium 4.6 mmol/L      Comment: Specimen hemolyzed.  Results may be affected.        Chloride 102 mmol/L      CO2 25.0 mmol/L      Calcium 8.6 mg/dL      Total Protein 6.6 g/dL      Albumin 3.1 g/dL      ALT (SGPT) 5 U/L      Comment: Specimen hemolyzed.  Results may be affected.        AST (SGOT) 17 U/L      Comment: Specimen hemolyzed.  Results may be affected.        Alkaline Phosphatase 51 U/L      Total Bilirubin 0.3 mg/dL      Globulin 3.5 gm/dL      A/G Ratio 0.9 g/dL      BUN/Creatinine Ratio 17.6     Anion Gap 9.0 mmol/L      eGFR 64.3 mL/min/1.73     Narrative:      GFR Normal >60  Chronic Kidney Disease <60  Kidney Failure <15    The GFR formula is only valid for adults with stable renal function between ages 18 and 70.    CBC & Differential [[id number]]  (Abnormal) Collected: 08/30/23 0524    Specimen: Blood Updated: 08/30/23 0621    Narrative:      The following orders were created for panel order CBC & Differential.  Procedure                               Abnormality         Status                     ---------                               -----------         ------                     CBC Auto Differential[[id number]]        Abnormal            Final result                Scan Slide[[id number]]                                                                    Please view results for these tests on the individual orders.    CBC Auto Differential [[id number]]  (Abnormal) Collected: 08/30/23 0524    Specimen: Blood Updated: 08/30/23 0621     WBC 4.81 10*3/mm3      RBC 3.79 10*6/mm3      Hemoglobin 10.6 g/dL      Hematocrit 32.5 %      MCV 85.8 fL      MCH 28.0 pg      MCHC 32.6 g/dL      RDW 14.2 %      RDW-SD 44.0 fl      MPV 12.3 fL      Platelets 154 10*3/mm3      Neutrophil % 67.4 %      Lymphocyte % 16.0 %      Monocyte % 13.7 %      Eosinophil % 2.3 %      Basophil % 0.4 %      Immature Grans % 0.2 %      Neutrophils, Absolute 3.24 10*3/mm3      Lymphocytes, Absolute 0.77 10*3/mm3      Monocytes, Absolute 0.66 10*3/mm3      Eosinophils, Absolute 0.11 10*3/mm3      Basophils, Absolute 0.02 10*3/mm3      Immature Grans, Absolute 0.01 10*3/mm3      nRBC 0.0 /100 WBC     Comprehensive Metabolic Panel [[id number]]  (Abnormal) Collected: 08/30/23 0524    Specimen: Blood Updated: 08/30/23 0558     Glucose 83 mg/dL      BUN 14 mg/dL      Creatinine 0.93 mg/dL      Sodium 138 mmol/L      Potassium 4.2 mmol/L      Chloride 104 mmol/L      CO2 26.0 mmol/L      Calcium 8.5 mg/dL      Total Protein 6.7 g/dL      Albumin 3.1 g/dL      ALT (SGPT) <5 U/L      AST (SGOT) 12 U/L      Alkaline Phosphatase 54 U/L      Total Bilirubin 0.3 mg/dL      Globulin 3.6 gm/dL      A/G Ratio 0.9 g/dL      BUN/Creatinine Ratio 15.1     Anion Gap 8.0 mmol/L      eGFR 62.6 mL/min/1.73     Narrative:      GFR Normal >60  Chronic Kidney Disease <60  Kidney Failure <15    The GFR formula is only valid for adults with stable renal function between ages 18 and 70.    Folate [[id number]]  (Normal) Collected: 08/29/23 0915    Specimen: Blood Updated: 08/29/23 2032     Folate 11.30 ng/mL     Narrative:      Results may be falsely increased if patient taking Biotin.      Vitamin B12 [[id number]]  (Abnormal) Collected:  08/29/23 0915    Specimen: Blood Updated: 08/29/23 2032     Vitamin B-12 198 pg/mL     Narrative:      Results may be falsely increased if patient taking Biotin.      Iron Profile [[id number]]  (Abnormal) Collected: 08/29/23 0525    Specimen: Blood Updated: 08/29/23 0814     Iron 30 mcg/dL      Iron Saturation (TSAT) 11 %      Transferrin 187 mg/dL      TIBC 279 mcg/dL     Basic Metabolic Panel [[id number]]  (Abnormal) Collected: 08/29/23 0525    Specimen: Blood Updated: 08/29/23 0653     Glucose 85 mg/dL      BUN 18 mg/dL      Creatinine 0.95 mg/dL      Sodium 137 mmol/L      Potassium 3.9 mmol/L      Chloride 97 mmol/L      CO2 31.0 mmol/L      Calcium 9.3 mg/dL      BUN/Creatinine Ratio 18.9     Anion Gap 9.0 mmol/L      eGFR 61.1 mL/min/1.73     Narrative:      GFR Normal >60  Chronic Kidney Disease <60  Kidney Failure <15    The GFR formula is only valid for adults with stable renal function between ages 18 and 70.    High Sensitivity Troponin T [[id number]]  (Abnormal) Collected: 08/29/23 0525    Specimen: Blood Updated: 08/29/23 0640     HS Troponin T 49 ng/L     Narrative:      High Sensitive Troponin T Reference Range:  <10.0 ng/L- Negative Female for AMI  <15.0 ng/L- Negative Male for AMI  >=10 - Abnormal Female indicating possible myocardial injury.  >=15 - Abnormal Male indicating possible myocardial injury.   Clinicians would have to utilize clinical acumen, EKG, Troponin, and serial changes to determine if it is an Acute Myocardial Infarction or myocardial injury due to an underlying chronic condition.         CBC (No Diff) [[id number]]  (Abnormal) Collected: 08/29/23 0525    Specimen: Blood Updated: 08/29/23 0628     WBC 4.42 10*3/mm3      RBC 3.57 10*6/mm3      Hemoglobin 10.4 g/dL      Comment: New Admit        Hematocrit 30.1 %      MCV 84.3 fL      MCH 29.1 pg      MCHC 34.6 g/dL      RDW 13.6 %      RDW-SD 41.9 fl      MPV 12.6 fL      Platelets 134 10*3/mm3     Extra Tubes [[id number]] Collected:  08/28/23 2226    Specimen: Blood, Venous Line Updated: 08/29/23 0230    Narrative:      The following orders were created for panel order Extra Tubes.  Procedure                               Abnormality         Status                     ---------                               -----------         ------                     [place] Top[[id number]]                                         Final result                 Please view results for these tests on the individual orders.    Gray Top [[id number]] Collected: 08/28/23 2226    Specimen: Blood Updated: 08/29/23 0230     Extra Tube Hold for add-ons.     Comment: Auto resulted.       High Sensitivity Troponin T 2Hr [[id number]]  (Abnormal) Collected: 08/29/23 0027    Specimen: Blood from Arm, Left Updated: 08/29/23 0056     HS Troponin T 52 ng/L      Troponin T Delta -7 ng/L     Narrative:      High Sensitive Troponin T Reference Range:  <10.0 ng/L- Negative Female for AMI  <15.0 ng/L- Negative Male for AMI  >=10 - Abnormal Female indicating possible myocardial injury.  >=15 - Abnormal Male indicating possible myocardial injury.   Clinicians would have to utilize clinical acumen, EKG, Troponin, and serial changes to determine if it is an Acute Myocardial Infarction or myocardial injury due to an underlying chronic condition.         High Sensitivity Troponin T [[id number]]  (Abnormal) Collected: 08/28/23 2226    Specimen: Blood Updated: 08/28/23 2359     HS Troponin T 59 ng/L      Comment: Specimen hemolyzed.  Results may be affected.       Narrative:      High Sensitive Troponin T Reference Range:  <10.0 ng/L- Negative Female for AMI  <15.0 ng/L- Negative Male for AMI  >=10 - Abnormal Female indicating possible myocardial injury.  >=15 - Abnormal Male indicating possible myocardial injury.   Clinicians would have to utilize clinical acumen, EKG, Troponin, and serial changes to determine if it is an Acute Myocardial Infarction or myocardial injury due to an underlying  chronic condition.         [place] Draw [[id number]] Collected: 08/28/23 2226    Specimen: Blood Updated: 08/28/23 2330    Narrative:      The following orders were created for panel order [place] Draw.  Procedure                               Abnormality         Status                     ---------                               -----------         ------                     Green Top (Gel)[[id number]]                                  Final result               Lavender Top[[id number]]                                     Final result               Gold Top - SST[[id number]]                                   Final result               Light Blue Top[[id number]]                                   Final result                 Please view results for these tests on the individual orders.    Green Top (Gel) [[id number]] Collected: 08/28/23 2226    Specimen: Blood Updated: 08/28/23 2330     Extra Tube Hold for add-ons.     Comment: Auto resulted.       Gold Top - SST [[id number]] Collected: 08/28/23 2226    Specimen: Blood Updated: 08/28/23 2330     Extra Tube Hold for add-ons.     Comment: Auto resulted.       Light Blue Top [[id number]] Collected: 08/28/23 2226    Specimen: Blood Updated: 08/28/23 2330     Extra Tube Hold for add-ons.     Comment: Auto resulted       Lavender Top [[id number]] Collected: 08/28/23 2226    Specimen: Blood Updated: 08/28/23 2330     Extra Tube hold for add-on     Comment: Auto resulted       CK [[id number]]  (Normal) Collected: 08/28/23 2226    Specimen: Blood Updated: 08/28/23 2308     Creatine Kinase 114 U/L      Comment: Specimen hemolyzed.  Results may be affected.       Magnesium [[id number]]  (Normal) Collected: 08/28/23 2226    Specimen: Blood Updated: 08/28/23 2308     Magnesium 2.1 mg/dL     BNP [[id number]]  (Normal) Collected: 08/28/23 2226    Specimen: Blood Updated: 08/28/23 2256     proBNP 455.2 pg/mL     Narrative:      Among patients with dyspnea, NT-proBNP is highly sensitive for the  "detection of acute congestive heart failure. In addition NT-proBNP of <300 pg/ml effectively rules out acute congestive heart failure with 99% negative predictive value.      Lipase [[id number]]  (Normal) Collected: 08/28/23 2226    Specimen: Blood Updated: 08/28/23 2254     Lipase 47 U/L           Imaging Results (Most Recent)       Procedure Component Value Units Date/Time    XR Chest 1 View [[id number]] Collected: 08/28/23 2305     Updated: 08/28/23 2308    Narrative:      XR CHEST 1 VIEW    HISTORY: Chest pain protocolChest Pain Protocol    COMPARISON: 6/8/2021.    FINDINGS:  Frontal view of the chest was obtained.    The lungs are clear. Cardiac silhouette is within normal limits. There is no  significant pleural effusion or pneumothorax.    The osseous structures and surrounding soft tissues demonstrate no acute  abnormality.    Upper abdomen is unremarkable.        Impression:      1. No acute cardiopulmonary disease.                Chief Complaint on Day of Discharge: No complaints    Hospital Course:  The patient is a 78 y.o. female who presented to [facility] with chest pain.  She was evaluated by Dr. Grubbs with cardiology and underwent left heart catheterization on 8/30/2023.  Per his documentation for left heart catheterization revealed nonobstructive disease.  Echocardiogram showed normal LVEF, no RWMA, mild MR, mild TR . she was cleared for discharge from cardiology standpoint today.  She denies any further complaints of chest pain.  Radial arteriotomy site within normal limits.  She denies any complaints and is agreeable to discharge home.    Condition on Discharge: Stable    Physical Exam on Discharge:  /63 (BP Location: Left arm, Patient Position: Lying)   Pulse 76   Temp 98.2 °F (36.8 °C) (Temporal)   Resp 18   Ht 162.6 cm (64.02\")   Wt 39.3 kg (86 lb 9.6 oz)   LMP  (LMP Unknown)   SpO2 94%   BMI 14.86 kg/m²   Physical Exam  Vitals and nursing note reviewed. "   Constitutional:       General: She is not in acute distress.  HENT:      Head: Normocephalic and atraumatic.      Mouth/Throat:      Pharynx: Oropharynx is clear.   Eyes:      Conjunctiva/sclera: Conjunctivae normal.   Cardiovascular:      Rate and Rhythm: Normal rate and regular rhythm.      Pulses: Normal pulses.   Pulmonary:      Effort: Pulmonary effort is normal.   Abdominal:      General: Bowel sounds are normal.      Palpations: Abdomen is soft.   Musculoskeletal:         General: Normal range of motion.      Cervical back: Normal range of motion.   Skin:     General: Skin is warm and dry.      Capillary Refill: Capillary refill takes 2 to 3 seconds. Capillary refill takes less than 2 seconds.   Neurological:      Mental Status: She is alert and oriented to person, place, and time. Mental status is at baseline.   Psychiatric:         Thought Content: Thought content normal.         Discharge Disposition:  Home or Self Care    Discharge Medications:     Discharge Medications        New Medications        Instructions Start Date   aspirin 81 MG EC tablet   81 mg, Oral, Daily   Start Date: September 1, 2023            Continue These Medications        Instructions Start Date   albuterol sulfate  (90 Base) MCG/ACT inhaler  Commonly known as: PROVENTIL HFA;VENTOLIN HFA;PROAIR HFA   INHALE 2 PUFFS BY MOUTH EVERY 6 HOURS AS NEEDED FOR WHEEZING      amLODIPine 2.5 MG tablet  Commonly known as: NORVASC   Take 1 tablet by mouth once daily      baclofen 10 MG tablet  Commonly known as: LIORESAL   10 mg, Oral, 2 Times Daily PRN      cetirizine 10 MG tablet  Commonly known as: zyrTEC   10 mg, Oral, Daily      dicyclomine 10 MG capsule  Commonly known as: BENTYL   3 times daily as needed abdominal cramps      divalproex 250 MG 24 hr tablet  Commonly known as: DEPAKOTE ER   TAKE 2 TABLETS BY MOUTH ONCE DAILY FOR HEADACHE      gabapentin 800 MG tablet  Commonly known as: NEURONTIN   1 twice daily for legs new  dosing      hydroxychloroquine 200 MG tablet  Commonly known as: PLAQUENIL   200 mg, Oral, Daily, Take one tablet daily except on Sundays. On Sundays, take 2 tablets.      ipratropium-albuterol 0.5-2.5 mg/3 ml nebulizer  Commonly known as: DUO-NEB   3 mL, Nebulization, Every 4 Hours PRN      lansoprazole 30 MG capsule  Commonly known as: PREVACID   30 mg, Oral, Daily      meclizine 25 MG tablet  Commonly known as: ANTIVERT   TAKE 1 TABLET BY MOUTH THREE TIMES DAILY AS NEEDED FOR DIZZINESS      VITAMIN D PO   500 Units, Oral, Daily             Stop These Medications      doxycycline 100 MG capsule  Commonly known as: MONODOX              Discharge Diet:   Diet Instructions       Diet: Cardiac Diets; Healthy Heart (2-3 Na+); Regular Texture (IDDSI 7); Thin (IDDSI 0)      Discharge Diet: Cardiac Diets    Cardiac Diet: Healthy Heart (2-3 Na+)    Texture: Regular Texture (IDDSI 7)    Fluid Consistency: Thin (IDDSI 0)            Activity at Discharge:   Activity Instructions       Gradually Increase Activity Until at Pre-Hospitalization Level              Discharge Care Plan/Instructions: Take medications as prescribed.  Keep follow-up appointments.  Return to ER if return of or worsening of symptoms.    Follow-up Appointments:   Future Appointments   Date Time Provider Department Center   9/7/2023  2:15 PM Errol Ruby MD [facility]   9/19/2023  9:15 AM Errol Ruby MD [facility]       Test Results Pending at Discharge:           Time: 35 mins

## 2023-08-31 NOTE — OUTREACH NOTE
Prep Survey      Flowsheet Row Responses   [facility] facility patient discharged from? [place]   Is LACE score < 7 ? No   Eligibility [facility]   Date of Admission 08/28/23   Date of Discharge 08/31/23   Discharge Disposition Home or Self Care   Discharge diagnosis Chest pain   Does the patient have one of the following disease processes/diagnoses(primary or secondary)? Other   Does the patient have Home health ordered? No   Is there a DME ordered? No   Prep survey completed? Yes            WILFREDO JONES - Registered Nurse

## 2023-08-31 NOTE — PROGRESS NOTES
"[facility] Cardiology Progress Note   LOS: 0 days   Patient Care Team:  Errol Ruby MD as PCP - General (Family Medicine)    Chief Complaint:    Chief Complaint   Patient presents with    Chest Pain    Abdominal Pain        Subjective     Interval History:   Patient Denies: no complaints today  Patient Complaints: No voiced complaints today.   History taken from: patient chart    No acute events overnight.  No further episodes of chest pain. LHC showing nonobstructive disease, no intervention performed. Radial cath site wnl. H/h and kidney functions table.     Objective     Vital Sign Min/Max for last 24 hours  Temp  Min: 97.3 °F (36.3 °C)  Max: 98.6 °F (37 °C)   BP  Min: 116/59  Max: 156/82   Pulse  Min: 60  Max: 76   Resp  Min: 16  Max: 20   SpO2  Min: 90 %  Max: 99 %   No data recorded   Weight  Min: 39.3 kg (86 lb 9.6 oz)  Max: 39.3 kg (86 lb 9.6 oz)     Flowsheet Rows      Flowsheet Row First Filed Value   Admission Height 162.6 cm (64\") Documented at 08/28/2023 2207   Admission Weight 42.6 kg (94 lb) Documented at 08/28/2023 2207              08/29/23  1550 08/30/23  0300 08/31/23  0413   Weight: 42.5 kg (93 lb 11.1 oz) 41.2 kg (90 lb 12.8 oz) 39.3 kg (86 lb 9.6 oz)       Physical Exam:  Physical Exam  Vitals and nursing note reviewed.   Constitutional:       General: She is not in acute distress.     Appearance: She is cachectic. She is not ill-appearing, toxic-appearing or diaphoretic.   HENT:      Head: Normocephalic.      Right Ear: External ear normal.      Left Ear: External ear normal.   Eyes:      General: Lids are normal.      Pupils: Pupils are equal, round, and reactive to light.   Neck:      Thyroid: No thyromegaly.      Vascular: No carotid bruit or JVD.      Trachea: No tracheal deviation.   Cardiovascular:      Rate and Rhythm: Normal rate and regular rhythm.      Heart sounds: Murmur heard.   Systolic murmur is present.     No friction rub. No gallop.   Pulmonary:      Effort: Pulmonary effort is " normal. No respiratory distress.      Breath sounds: Normal breath sounds. No stridor. No wheezing or rales.   Chest:      Chest wall: No tenderness.   Abdominal:      General: Bowel sounds are normal. There is no distension.      Palpations: Abdomen is soft.      Tenderness: There is no abdominal tenderness.   Musculoskeletal:      Right lower leg: No edema.      Left lower leg: No edema.   Skin:     General: Skin is warm and dry.      Findings: No erythema or rash.      Comments: Radial cath site wnl   Neurological:      Mental Status: She is alert and oriented to person, place, and time.      Cranial Nerves: No cranial nerve deficit.      Deep Tendon Reflexes: Reflexes are normal and symmetric. Reflexes normal.   Psychiatric:         Behavior: Behavior normal.         Thought Content: Thought content normal.         Judgment: Judgment normal.        Results Review:     Results from last 7 days   Lab Units 08/31/23 0257 08/30/23 0524 08/29/23 0525 08/28/23 2226   SODIUM mmol/L 136 138 137 140   POTASSIUM mmol/L 4.6 4.2 3.9 3.9   CHLORIDE mmol/L 102 104 97* 97*   CO2 mmol/L 25.0 26.0 31.0* 29.0   BUN mg/dL 16 14 18 17   CREATININE mg/dL 0.91 0.93 0.95 1.21*   CALCIUM mg/dL 8.6 8.5* 9.3 10.2   BILIRUBIN mg/dL 0.3 0.3  --  0.2   ALK PHOS U/L 51 54  --  66   ALT (SGPT) U/L 5 <5  --  9   AST (SGOT) U/L 17 12  --  23   GLUCOSE mg/dL 75 83 85 105*         Estimated Creatinine Clearance: 31.1 mL/min (by C-G formula based on SCr of 0.91 mg/dL).    Results from last 7 days   Lab Units 08/28/23  2226   MAGNESIUM mg/dL 2.1               Results from last 7 days   Lab Units 08/31/23  0257 08/30/23  0524 08/29/23  0525 08/28/23  2226   WBC 10*3/mm3 4.85 4.81 4.42 5.15   HEMOGLOBIN g/dL 11.0* 10.6* 10.4* 12.2   PLATELETS 10*3/mm3 97* 154 134* 170             Lab Results   Component Value Date    PROBNP 455.2 08/28/2023       I/O last 3 completed shifts:  In: 891.3 [P.O.:720; I.V.:171.3]  Out: 1450  [Urine:1450]    Cardiographics:  ECG/EMG Results (last 24 hours)       Procedure Component Value Units Date/Time    SCANNED EKG [[id number]] Resulted: 08/28/23     Updated: 08/29/23 1257    SCANNED - TELEMETRY   [[id number]] Resulted: 08/28/23     Updated: 08/29/23 1420    SCANNED - TELEMETRY   [[id number]] Resulted: 08/28/23     Updated: 08/29/23 1442    Adult Transthoracic Echo Complete w/ Color, Spectral and Contrast if Necessary Per Protocol [[id number]] Resulted: 08/29/23 1750     Updated: 08/29/23 1753     EF(MOD-bp) 71.1 %      LVIDd 3.8 cm      LVIDs 2.02 cm      IVSd 0.87 cm      LVPWd 0.97 cm      FS 46.6 %      IVS/LVPW 0.90 cm      ESV(cubed) 8.2 ml      LV Sys Vol (BSA corrected) 12.6 cm2      EDV(cubed) 53.8 ml      LV [place] Vol (BSA corrected) 39.9 cm2      LVOT area 3.2 cm2      LV mass(C)d 103.1 grams      LVOT diam 2.01 cm      EDV(MOD-sp2) 53.9 ml      EDV(MOD-sp4) 56.3 ml      ESV(MOD-sp2) 14.4 ml      ESV(MOD-sp4) 17.8 ml      SV(MOD-sp2) 39.5 ml      SV(MOD-sp4) 38.5 ml      SI(MOD-sp2) 28.0 ml/m2      SI(MOD-sp4) 27.3 ml/m2      EF(MOD-sp2) 73.3 %      EF(MOD-sp4) 68.4 %      MV E max [place] 63.0 cm/sec      MV A max [place] 107.0 cm/sec      MV dec time 0.26 msec      MV E/A 0.59     LA ESV Index (BP) 22.6 ml/m2      Med Peak E' [place] 8.1 cm/sec      Lat Peak E' [place] 10.9 cm/sec      Avg E/e' ratio 6.63     SV(LVOT) 52.4 ml      RVIDd 2.42 cm      TAPSE (>1.6) 1.99 cm      LA dimension (2D)  3.3 cm      LV V1 max 92.5 cm/sec      LV V1 max PG 3.4 mmHg      LV V1 mean PG 2.00 mmHg      LV V1 VTI 16.5 cm      Ao pk [place] 117.0 cm/sec      Ao max PG 5.5 mmHg      Ao mean PG 3.0 mmHg      Ao V2 VTI 24.3 cm      CHRISTIAN(I,D) 2.16 cm2      MV max PG 4.2 mmHg      MV mean PG 1.26 mmHg      MV V2 VTI 23.8 cm      MV P1/2t 70.7 msec      MVA(P1/2t) 3.1 cm2      MVA(VTI) 2.20 cm2      MV dec slope 264.8 cm/sec2      MR max [place] 504.9 cm/sec      MR max .0 mmHg      TR max [place] 294.5 cm/sec      TR max PG 34.7 mmHg       PA V2 max 86.9 cm/sec      Ao root diam 2.9 cm      ACS 1.90 cm      RVSP(TR) 37.7 mmHg      RAP systole 3.0 mmHg     Narrative:        Left ventricular systolic function is normal. Left ventricular ejection   fraction appears to be 61 - 65%.    Left ventricular diastolic function is consistent with (grade Ia w/high   LAP) impaired relaxation.    Estimated right ventricular systolic pressure from tricuspid   regurgitation is mildly elevated (35-45 mmHg).            Results for orders placed during the hospital encounter of 08/28/23    Adult Transthoracic Echo Complete w/ Color, Spectral and Contrast if Necessary Per Protocol    Interpretation Summary    Left ventricular systolic function is normal. Left ventricular ejection fraction appears to be 61 - 65%.    Left ventricular diastolic function is consistent with (grade Ia w/high LAP) impaired relaxation.    Estimated right ventricular systolic pressure from tricuspid regurgitation is mildly elevated (35-45 mmHg).      Imaging Results (Most Recent)       Procedure Component Value Units Date/Time    XR Chest 1 View [[id number]] Collected: 08/28/23 2305     Updated: 08/28/23 2308    Narrative:      XR CHEST 1 VIEW    HISTORY: Chest pain protocolChest Pain Protocol    COMPARISON: 6/8/2021.    FINDINGS:  Frontal view of the chest was obtained.    The lungs are clear. Cardiac silhouette is within normal limits. There is no  significant pleural effusion or pneumothorax.    The osseous structures and surrounding soft tissues demonstrate no acute  abnormality.    Upper abdomen is unremarkable.        Impression:      1. No acute cardiopulmonary disease.                XR Chest 1 View    Result Date: 8/28/2023  1. No acute cardiopulmonary disease.      Medication Review:     Current Facility-Administered Medications:     [DISCONTINUED] acetaminophen (TYLENOL) tablet 650 mg, 650 mg, Oral, Q4H PRN **OR** acetaminophen (TYLENOL) 160 MG/5ML solution 650 mg, 650 mg, Oral, Q4H PRN  **OR** acetaminophen (TYLENOL) suppository 650 mg, 650 mg, Rectal, Q4H PRN, Margarita Grubbs MD    acetaminophen (TYLENOL) tablet 650 mg, 650 mg, Oral, Q4H PRN, Margarita Grubbs MD    amLODIPine (NORVASC) tablet 2.5 mg, 2.5 mg, Oral, Daily, Margarita Grubbs MD, 2.5 mg at 08/31/23 0853    aspirin EC tablet 81 mg, 81 mg, Oral, Daily, Margarita Grubbs MD, 81 mg at 08/31/23 0853    baclofen (LIORESAL) tablet 10 mg, 10 mg, Oral, BID PRN, Margarita Grubbs MD    sennosides-docusate (PERICOLACE) 8.6-50 MG per tablet 2 tablet, 2 tablet, Oral, BID, 2 tablet at 08/31/23 0853 **AND** polyethylene glycol (MIRALAX) packet 17 g, 17 g, Oral, Daily PRN, 17 g at 08/30/23 0908 **AND** bisacodyl (DULCOLAX) EC tablet 5 mg, 5 mg, Oral, Daily PRN **AND** bisacodyl (DULCOLAX) suppository 10 mg, 10 mg, Rectal, Daily PRN, Margarita Grubbs MD    cetirizine (zyrTEC) tablet 10 mg, 10 mg, Oral, Daily, Margarita Grubbs MD, 10 mg at 08/31/23 0853    cholecalciferol (VITAMIN D3) tablet 500 Units, 500 Units, Oral, Daily, Margarita Grubbs MD, 500 Units at 08/31/23 0854    dicyclomine (BENTYL) capsule 10 mg, 10 mg, Oral, TID PRN, Margarita Grubbs MD, 10 mg at 08/31/23 0035    divalproex (DEPAKOTE ER) 24 hr tablet 500 mg, 500 mg, Oral, Daily, Margarita Grubbs MD, 500 mg at 08/31/23 0853    gabapentin (NEURONTIN) capsule 800 mg, 800 mg, Oral, Q12H, Levill, Dominga G, APRN, 800 mg at 08/31/23 0853    hydroxychloroquine (PLAQUENIL) tablet 200 mg, 200 mg, Oral, Daily, Margarita Grubbs MD, 200 mg at 08/31/23 0856    ipratropium-albuterol (DUO-NEB) nebulizer solution 3 mL, 3 mL, Nebulization, Q4H PRN, Margarita Grubbs MD    LORazepam (ATIVAN) injection 0.5 mg, 0.5 mg, Intravenous, Q6H PRN, Margarita Grubbs MD    meclizine (ANTIVERT) tablet 25 mg, 25 mg, Oral, TID PRN, Margarita Grubbs MD    nitroglycerin (NITROSTAT) SL tablet 0.4 mg, 0.4 mg, Sublingual, Q5 Min PRN, Margarita Grubbs MD    nitroglycerin (NITROSTAT) SL tablet 0.4 mg, 0.4 mg, Sublingual, Q5 Min PRN, Margarita Grubbs MD     ondansetron (ZOFRAN) tablet 4 mg, 4 mg, Oral, Q6H PRN **OR** ondansetron (ZOFRAN) injection 4 mg, 4 mg, Intravenous, Q6H PRN, Margarita Grubbs MD, 4 mg at 08/29/23 0755    pantoprazole (PROTONIX) EC tablet 40 mg, 40 mg, Oral, Q AM, Margarita Grubbs MD, 40 mg at 08/31/23 0541    Sodium Chloride (PF) 0.9 % 10 mL, 10 mL, Intravenous, Q12H, Margarita Grubbs MD, 10 mL at 08/31/23 0858    sodium chloride 0.9 % flush 10 mL, 10 mL, Intravenous, PRN, Margarita Grubbs MD    sodium chloride 0.9 % flush 10 mL, 10 mL, Intravenous, Q12H, Margarita Grubbs MD, 10 mL at 08/31/23 0858    sodium chloride 0.9 % flush 10 mL, 10 mL, Intravenous, PRN, Margarita Grubbs MD    sodium chloride 0.9 % flush 10 mL, 10 mL, Intravenous, Q12H, Margarita Grubbs MD, 10 mL at 08/31/23 0858    sodium chloride 0.9 % flush 10 mL, 10 mL, Intravenous, PRNSultan Adnan, MD    sodium chloride 0.9 % infusion 40 mL, 40 mL, Intravenous, PRN, Margarita Grubbs MD    Assessment & Plan       Chest pain    Normocytic anemia    Underweight    CKD (chronic kidney disease) stage 2, GFR 60-89 ml/min    Severe malnutrition    NSTEMI, initial episode of care    #1. Chest pain: Somewhat atypical in nature with GI involvement.  She does have multiple cardiac risk factors including hypertension, tobacco use.  Mildly elevated high-sensitivity troponin.  EKG negative for acute findings. She is hemodynamically stable. Currently CP free.   -Echocardiogram showing normal LVEF, no regional wall motion abnormalities, mild MR, mild TR  -Nuclear stress test showing intermediate restudy with moderate size infarct in the lateral wall and inferior wall with moderate mercedes-infarct ischemia.  She was recommended for cardiac catheterization per Dr. Grubbs showing mild nonobstructive disease noted in the mid LAD at 30%, moderate nonobstructive disease and a small caliber diagonal at 60%.  No intervention performed.  Radial cath site within normal limits.  H&H and kidney function stable.  Post cath  instructions discussed with patient including no driving x48 hours, no lifting greater than 10 pounds x 5 days.    -Continue Norvasc 2.5 mg,Protonix, ASA 81mg    #2.  Hypertension: She was hypertensive upon arrival at 213/100.  Blood pressure now stable.   She has been on Norvasc.     Mary Anne Wolf  reports that she has been smoking cigarettes. She has been smoking an average of .25 packs per day. She has never used smokeless tobacco.. I have educated her on the risk of diseases from using tobacco products such as cancer, COPD, and heart disease.      I advised her to quit and she is not willing to quit.     I spent 3  minutes counseling the patient.     Body mass index is 14.86 kg/m². Low BMI. Dietary consult per primary team.     I spent 38 minutes caring for Mary Anne on this date of service. This time includes time spent by me in the following activities: preparing for the visit, reviewing tests, obtaining and/or reviewing a separately obtained history, performing a medically appropriate examination and/or evaluation, counseling and educating the patient/family/caregiver, referring and communicating with other health care professionals, documenting information in the medical record, independently interpreting results and communicating that information with the patient/family/caregiver, and care coordination.       Plan for disposition: D/C home per primary. Follow up with PCP. No cardiology follow up needed.             This document has been electronically signed by MARINE Marino on August 31, 2023 12:10 CDT      Electronically signed by MARINE Marino, 08/31/23, 12:10 PM CDT.    I personally saw and examined Mary Anne Wolf after the APRN.  I personally performed a history and physical examination of the patient.  I personally reviewed independent findings and plan of care.  I discussed management with the APRN.  I agree with the APRN's documentation.    Subjective no acute issues  overnight.  Lying comfortably without acute distress.    Vitals:    08/31/23 0413 08/31/23 0718 08/31/23 0723 08/31/23 1115   BP:   151/67 127/63   BP Location:   Left arm Left arm   Patient Position:   Sitting Lying   Pulse:  74 75 76   Resp:   20 18   Temp:   97.4 °F (36.3 °C) 98.2 °F (36.8 °C)   TempSrc:   Temporal Temporal   SpO2:   90% 94%   Weight: 39.3 kg (86 lb 9.6 oz)      Height:         1.  Chest pain:  Cardiac cath without evidence of obstructive coronary artery disease.  Known history of esophageal spasms and narrowing requiring dilatation previously.  Echocardiogram showed preserved LV systolic function.  She will be continued on medical management for underlying nonobstructive coronary artery disease with aspirin/statin/calcium channel blocker and PPI.    Follow-up with GI for further evaluation.    Thank you for the consult.  We will sign off.  Follow-up with PCP.  Follow-up with cardiology on as-needed basis.          Electronically signed by Margarita Grubbs MD, 08/31/23, 1:43 PM CDT.

## 2023-09-01 ENCOUNTER — TRANSITIONAL CARE MANAGEMENT TELEPHONE ENCOUNTER (OUTPATIENT)
Dept: CALL CENTER | Facility: HOSPITAL | Age: 78
End: 2023-09-01
Payer: MEDICARE

## 2023-09-01 ENCOUNTER — DOCUMENTATION (OUTPATIENT)
Dept: CARDIAC REHAB | Facility: HOSPITAL | Age: 78
End: 2023-09-01
Payer: MEDICARE

## 2023-09-01 NOTE — OUTREACH NOTE
Call Center TCM Note      Flowsheet Row Responses   [facility] patient discharged from? [place]   Does the patient have one of the following disease processes/diagnoses(primary or secondary)? Other   TCM attempt successful? No   Unsuccessful attempts Attempt 2            Suzanna Johnson RN    9/1/2023, 14:23 CDT

## 2023-09-01 NOTE — OUTREACH NOTE
Call Center TCM Note      Flowsheet Row Responses   [facility] patient discharged from? [place]   Does the patient have one of the following disease processes/diagnoses(primary or secondary)? Other   TCM attempt successful? No   Unsuccessful attempts Attempt 1  [No updated verbal release on file from PCP group]            Suzanna Johnson RN    9/1/2023, 13:54 CDT

## 2023-09-02 ENCOUNTER — TRANSITIONAL CARE MANAGEMENT TELEPHONE ENCOUNTER (OUTPATIENT)
Dept: CALL CENTER | Facility: HOSPITAL | Age: 78
End: 2023-09-02
Payer: MEDICARE

## 2023-09-02 NOTE — OUTREACH NOTE
Call Center TCM Note      Flowsheet Row Responses   [facility] patient discharged from? [place]   Does the patient have one of the following disease processes/diagnoses(primary or secondary)? Other   TCM attempt successful? No   Unsuccessful attempts Attempt 3            Jammie Cade RN    9/2/2023, 12:42 EDT

## 2023-09-05 RX ORDER — DIVALPROEX SODIUM 250 MG/1
500 TABLET, EXTENDED RELEASE ORAL DAILY
Qty: 60 TABLET | Refills: 0 | Status: SHIPPED | OUTPATIENT
Start: 2023-09-05

## 2023-09-05 NOTE — TELEPHONE ENCOUNTER
Incoming Refill Request      Medication requested (name and dose): Divalproex    Pharmacy where request should be sent: [facility] in [place]    Additional details provided by patient: Pt was told [facility] requested this last week and she is out.     Best call back number: [phone]    Does the patient have less than a 3 day supply:  [x] Yes  [] No    Sara Yoder, [place] Rep  09/05/23, 14:45 CDT

## 2023-09-13 LAB
QT INTERVAL: 374 MS
QT INTERVAL: 416 MS
QT INTERVAL: 426 MS
QTC INTERVAL: 439 MS
QTC INTERVAL: 441 MS
QTC INTERVAL: 443 MS

## 2023-09-19 ENCOUNTER — OFFICE VISIT (OUTPATIENT)
Dept: FAMILY MEDICINE CLINIC | Facility: CLINIC | Age: 78
End: 2023-09-19
Payer: MEDICARE

## 2023-09-19 VITALS
DIASTOLIC BLOOD PRESSURE: 68 MMHG | OXYGEN SATURATION: 97 % | SYSTOLIC BLOOD PRESSURE: 122 MMHG | BODY MASS INDEX: 15.69 KG/M2 | WEIGHT: 91.9 LBS | HEART RATE: 76 BPM | HEIGHT: 64 IN

## 2023-09-19 DIAGNOSIS — Z72.0 TOBACCO USE: Chronic | ICD-10-CM

## 2023-09-19 DIAGNOSIS — G43.909 MIGRAINE SYNDROME: Chronic | ICD-10-CM

## 2023-09-19 DIAGNOSIS — Z12.31 SCREENING MAMMOGRAM FOR BREAST CANCER: ICD-10-CM

## 2023-09-19 DIAGNOSIS — R21 RASH OF BOTH HANDS: ICD-10-CM

## 2023-09-19 DIAGNOSIS — I25.10 CORONARY ARTERY DISEASE INVOLVING NATIVE CORONARY ARTERY OF NATIVE HEART WITHOUT ANGINA PECTORIS: Primary | Chronic | ICD-10-CM

## 2023-09-19 DIAGNOSIS — Z78.9 MEDICALLY COMPLEX PATIENT: Chronic | ICD-10-CM

## 2023-09-19 DIAGNOSIS — M32.9 SYSTEMIC LUPUS ERYTHEMATOSUS, UNSPECIFIED SLE TYPE, UNSPECIFIED ORGAN INVOLVEMENT STATUS: Chronic | ICD-10-CM

## 2023-09-19 PROBLEM — D64.9 NORMOCYTIC ANEMIA: Chronic | Status: ACTIVE | Noted: 2023-08-29

## 2023-09-19 PROBLEM — R63.6 UNDERWEIGHT: Chronic | Status: ACTIVE | Noted: 2023-08-29

## 2023-09-19 PROBLEM — I21.4 NSTEMI, INITIAL EPISODE OF CARE: Status: RESOLVED | Noted: 2023-08-28 | Resolved: 2023-09-19

## 2023-09-19 PROBLEM — R07.9 CHEST PAIN: Status: RESOLVED | Noted: 2023-08-29 | Resolved: 2023-09-19

## 2023-09-19 PROBLEM — E43 SEVERE MALNUTRITION: Status: RESOLVED | Noted: 2023-08-30 | Resolved: 2023-09-19

## 2023-09-19 PROBLEM — N18.2 CKD (CHRONIC KIDNEY DISEASE) STAGE 2, GFR 60-89 ML/MIN: Chronic | Status: ACTIVE | Noted: 2023-08-29

## 2023-09-19 PROCEDURE — 3074F SYST BP LT 130 MM HG: CPT | Performed by: FAMILY MEDICINE

## 2023-09-19 PROCEDURE — 3078F DIAST BP <80 MM HG: CPT | Performed by: FAMILY MEDICINE

## 2023-09-19 PROCEDURE — 99214 OFFICE O/P EST MOD 30 MIN: CPT | Performed by: FAMILY MEDICINE

## 2023-09-19 RX ORDER — ASPIRIN 81 MG/1
81 TABLET ORAL DAILY
Qty: 90 TABLET | Refills: 3 | Status: SHIPPED | OUTPATIENT
Start: 2023-09-19 | End: 2024-09-13

## 2023-09-19 RX ORDER — BACLOFEN 10 MG/1
1 TABLET ORAL 2 TIMES DAILY PRN
COMMUNITY
Start: 2023-08-14 | End: 2023-11-13

## 2023-09-19 RX ORDER — TRIAMCINOLONE ACETONIDE 5 MG/G
CREAM TOPICAL
Qty: 60 G | Refills: 5 | Status: SHIPPED | OUTPATIENT
Start: 2023-09-19

## 2023-09-19 RX ORDER — DIVALPROEX SODIUM 250 MG/1
500 TABLET, EXTENDED RELEASE ORAL DAILY
Qty: 180 TABLET | Refills: 3 | Status: SHIPPED | OUTPATIENT
Start: 2023-09-19

## 2023-09-19 RX ORDER — MECLIZINE HYDROCHLORIDE 25 MG/1
25 TABLET ORAL 3 TIMES DAILY PRN
COMMUNITY

## 2023-09-19 RX ORDER — AMLODIPINE BESYLATE 2.5 MG/1
1 TABLET ORAL DAILY
COMMUNITY
End: 2023-09-19

## 2023-09-19 RX ORDER — PREDNISONE 20 MG/1
20 TABLET ORAL 2 TIMES DAILY
COMMUNITY
End: 2023-09-19

## 2023-09-19 RX ORDER — DIVALPROEX SODIUM 500 MG/1
500 TABLET, EXTENDED RELEASE ORAL 2 TIMES DAILY
COMMUNITY
End: 2023-09-19

## 2023-09-19 RX ORDER — ALBUTEROL SULFATE 90 UG/1
2 AEROSOL, METERED RESPIRATORY (INHALATION) EVERY 6 HOURS PRN
COMMUNITY
End: 2023-09-19

## 2023-09-19 RX ORDER — HYDROXYCHLOROQUINE SULFATE 200 MG/1
200 TABLET, FILM COATED ORAL
COMMUNITY

## 2023-09-19 NOTE — PROGRESS NOTES
Subjective   Mary Anne Wolf is a 78 y.o. female.  Reevaluation medically complex patient lupus hypertension diagnoses below in the interim developed chest pain was hospitalized about 2-1/2 weeks ago for same.  Found to have nonobstructive coronary disease placed on aspirin a day only.  Continue to smoke about 2 cigarettes a day.  Has a new rheumatologist.  Medicines have remained the same.  Chart has been reviewed.  Is due for mammogram next month.  Is also developed rash on the hands related to eczema and probably mild lupus.  Chart reviewed.    History of Present Illness   HPI    The following portions of the patient's history were reviewed and updated as appropriate: allergies, current medications, past family history, past medical history, past social history, past surgical history, and problem list.    Review of Systems  Review of Systems   Constitutional:  Negative for activity change, appetite change, fatigue and unexpected weight change.   HENT:  Negative for trouble swallowing and voice change.    Eyes:  Negative for redness and visual disturbance.   Respiratory:  Negative for cough and wheezing.    Cardiovascular:  Negative for chest pain and palpitations.   Gastrointestinal:  Negative for abdominal pain, constipation, diarrhea, nausea and vomiting.   Genitourinary:  Negative for urgency.   Musculoskeletal:  Positive for arthralgias. Negative for joint swelling.   Skin:  Positive for rash.   Neurological:  Negative for syncope and headaches.   Hematological:  Negative for adenopathy.   Psychiatric/Behavioral:  Negative for sleep disturbance.      Objective   Physical Exam  Physical Exam  Constitutional:       Appearance: She is well-developed.   HENT:      Head: Normocephalic.   Eyes:      Pupils: Pupils are equal, round, and reactive to light.   Neck:      Thyroid: No thyromegaly.   Cardiovascular:      Rate and Rhythm: Normal rate and regular rhythm.      Heart sounds: Normal heart sounds. No murmur  "heard.    No friction rub. No gallop.   Pulmonary:      Breath sounds: Normal breath sounds.   Abdominal:      General: There is no distension.      Palpations: Abdomen is soft. There is no mass.      Tenderness: There is no abdominal tenderness.   Musculoskeletal:         General: Normal range of motion.      Cervical back: Normal range of motion.      Comments: No edema 1+ pulses   Skin:     General: Skin is warm and dry.      Comments: Bilateral hand shows mild thickening of the palms and fingers mild eczematous changes discolorations.  Consistent with what looks like hand eczema and/or mild lupus.   Neurological:      Mental Status: She is alert and oriented to person, place, and time.      Deep Tendon Reflexes: Reflexes are normal and symmetric.   Psychiatric:         Mood and Affect: Affect is blunt.         Speech: Speech is delayed.         Behavior: Behavior is slowed.         Cognition and Memory: Cognition normal.         Visit Vitals  /68   Pulse 76   Ht 162.6 cm (64.02\")   Wt 41.7 kg (91 lb 14.4 oz)   LMP  (LMP Unknown)   SpO2 97%   BMI 15.76 kg/m²     Body mass index is 15.76 kg/m².    /68   Pulse 76   Ht 162.6 cm (64.02\")   Wt 41.7 kg (91 lb 14.4 oz)   LMP  (LMP Unknown)   SpO2 97%   BMI 15.76 kg/m²     Assessment/Plan   Diagnoses and all orders for this visit:    1. Coronary artery disease involving native coronary artery of native heart without angina pectoris (Primary)    2. Medically complex patient    3. Tobacco use    4. Systemic lupus erythematosus, unspecified SLE type, unspecified organ involvement status  -     triamcinolone (KENALOG) 0.5 % cream; Apply to hands 3 times daily for 7 to 10 days and as needed  Dispense: 60 g; Refill: 5    5. Rash of both hands  -     triamcinolone (KENALOG) 0.5 % cream; Apply to hands 3 times daily for 7 to 10 days and as needed  Dispense: 60 g; Refill: 5    6. Screening mammogram for breast cancer  -     Mammo Screening Digital Tomosynthesis " Bilateral With CAD; Future    7. Migraine syndrome  -     divalproex (DEPAKOTE ER) 250 MG 24 hr tablet; Take 2 tablets by mouth Daily.  Dispense: 180 tablet; Refill: 3    Other orders  -     aspirin 81 MG EC tablet; Take 1 tablet by mouth Daily for 360 days.  Dispense: 90 tablet; Refill: 3    For the hands counseled on cool water mild soap routine bathing mid mid to high potency steroid cream for 17 days and as needed otherwise referred back to rheumatologist.  Counseled on COVID flu RSV vaccines in the fall.  Continue medicines as outlined.  Continue seeing all subspecialist.  Recheck 6 months will be time for subsequent Medicare

## 2023-09-27 ENCOUNTER — TELEPHONE (OUTPATIENT)
Dept: FAMILY MEDICINE CLINIC | Facility: CLINIC | Age: 78
End: 2023-09-27
Payer: MEDICARE

## 2023-09-27 NOTE — TELEPHONE ENCOUNTER
Pt was called and notified that we have not received anything from her other provider .She understood

## 2023-09-27 NOTE — TELEPHONE ENCOUNTER
Ms. Mary Anne Wolf called and wants to know if Dr. Ruby has received anything from her Lupus Doctor about getting a bone density test.    Pt call back [phone]

## (undated) DEVICE — CVR PROB ULTRASND INTUIT RL 5.5X58IN STRL LF

## (undated) DEVICE — BITEBLOCK ENDO W/STRAP 60F A/ LF DISP

## (undated) DEVICE — ELECTRODE,RT,STRESS,FOAM,50PK: Brand: MEDLINE

## (undated) DEVICE — GLV SURG SENSICARE MICRO PF LF 7.5 STRL

## (undated) DEVICE — MODEL BT2000 P/N 700287-012KIT CONTENTS: MANIFOLD WITH SALINE AND CONTRAST PORTS, SALINE TUBING WITH SPIKE AND HAND SYRINGE, TRANSDUCER: Brand: BT2000 AUTOMATED MANIFOLD KIT

## (undated) DEVICE — GW PERIPH GUIDERIGHT STD/EXCHNG/J/TIP SS 0.035IN 5X260CM

## (undated) DEVICE — PK CATH LAB 60

## (undated) DEVICE — CATH DIAG IMPULSE M/ PK 145 5FR

## (undated) DEVICE — SOL IRR H2O BTL 1000ML STRL

## (undated) DEVICE — Device

## (undated) DEVICE — GLV SURG SENSICARE PI PF LF 7 GRN STRL

## (undated) DEVICE — TR BAND RADIAL ARTERY COMPRESSION DEVICE: Brand: TR BAND

## (undated) DEVICE — SINGLE-USE BIOPSY FORCEPS: Brand: RADIAL JAW 4

## (undated) DEVICE — X-DRAPE ABS 12"X17" .25MM LEAD EQUIV STERILE X-RAY SHIELD 10/BOX: Brand: X-DRAPE

## (undated) DEVICE — CANN SMPL SOFTECH BIFLO ETCO2 A/M 7FT

## (undated) DEVICE — STERILE POLYISOPRENE POWDER-FREE SURGICAL GLOVES WITH EMOLLIENT COATING: Brand: PROTEXIS

## (undated) DEVICE — MODEL AT P65, P/N 701554-001KIT CONTENTS: HAND CONTROLLER, 3-WAY HIGH-PRESSURE STOPCOCK WITH ROTATING END AND PREMIUM HIGH-PRESSURE TUBING: Brand: ANGIOTOUCH® KIT

## (undated) DEVICE — A2000 MULTI-USE SYRINGE KIT, P/N 701277-003KIT CONTENTS: 100ML CONTRAST RESERVOIR AND TUBING WITH CONTRAST SPIKE AND CLAMP: Brand: A2000 MULTI-USE SYRINGE KIT

## (undated) DEVICE — GLIDESHEATH SLENDER STAINLESS STEEL KIT: Brand: GLIDESHEATH SLENDER

## (undated) DEVICE — GLV SURG SENSICARE POLYISPRN W/ALOE PF LF 6.5 GRN STRL